# Patient Record
Sex: FEMALE | Race: BLACK OR AFRICAN AMERICAN | NOT HISPANIC OR LATINO | Employment: FULL TIME | URBAN - METROPOLITAN AREA
[De-identification: names, ages, dates, MRNs, and addresses within clinical notes are randomized per-mention and may not be internally consistent; named-entity substitution may affect disease eponyms.]

---

## 2023-01-20 PROBLEM — J45.30 MILD PERSISTENT ASTHMA WITHOUT COMPLICATION (HHS-HCC): Status: ACTIVE | Noted: 2023-01-20

## 2023-01-20 PROBLEM — M25.569 KNEE PAIN: Status: ACTIVE | Noted: 2023-01-20

## 2023-01-20 PROBLEM — Z98.890 HISTORY OF ABDOMINAL PARACENTESIS: Status: ACTIVE | Noted: 2023-01-20

## 2023-01-20 PROBLEM — M25.561 ACUTE PAIN OF RIGHT KNEE: Status: ACTIVE | Noted: 2023-01-20

## 2023-01-20 PROBLEM — R07.9 CHEST PAIN: Status: ACTIVE | Noted: 2023-01-20

## 2023-01-20 PROBLEM — J04.0 ACUTE LARYNGITIS: Status: ACTIVE | Noted: 2023-01-20

## 2023-01-20 PROBLEM — E78.2 COMBINED HYPERLIPIDEMIA: Status: ACTIVE | Noted: 2023-01-20

## 2023-01-20 PROBLEM — M54.9 BACK PAIN: Status: ACTIVE | Noted: 2023-01-20

## 2023-01-20 PROBLEM — M54.6 THORACIC BACK PAIN: Status: ACTIVE | Noted: 2023-01-20

## 2023-01-20 PROBLEM — S39.012A STRAIN OF LUMBAR PARASPINAL MUSCLE, INITIAL ENCOUNTER: Status: ACTIVE | Noted: 2023-01-20

## 2023-01-20 PROBLEM — R30.0 DYSURIA: Status: ACTIVE | Noted: 2023-01-20

## 2023-01-20 PROBLEM — M77.9 BONY SPUR: Status: ACTIVE | Noted: 2023-01-20

## 2023-01-20 PROBLEM — M67.88 ACHILLES TENDINOSIS: Status: ACTIVE | Noted: 2023-01-20

## 2023-01-20 PROBLEM — S29.012A UPPER BACK STRAIN: Status: ACTIVE | Noted: 2023-01-20

## 2023-01-20 PROBLEM — R30.9 PAIN WITH URINATION: Status: ACTIVE | Noted: 2023-01-20

## 2023-01-20 PROBLEM — M25.562 CHRONIC PAIN OF LEFT KNEE: Status: ACTIVE | Noted: 2023-01-20

## 2023-01-20 PROBLEM — N92.0 MENORRHAGIA WITH REGULAR CYCLE: Status: ACTIVE | Noted: 2023-01-20

## 2023-01-20 PROBLEM — M54.50 LOW BACK PAIN: Status: ACTIVE | Noted: 2023-01-20

## 2023-01-20 PROBLEM — M25.562 ACUTE PAIN OF LEFT KNEE: Status: ACTIVE | Noted: 2023-01-20

## 2023-01-20 PROBLEM — M79.673 FOOT PAIN: Status: ACTIVE | Noted: 2023-01-20

## 2023-01-20 PROBLEM — R05.9 COUGH: Status: ACTIVE | Noted: 2023-01-20

## 2023-01-20 PROBLEM — E78.5 DYSLIPIDEMIA: Status: ACTIVE | Noted: 2023-01-20

## 2023-01-20 PROBLEM — E11.9 DIABETES MELLITUS, TYPE 2 (MULTI): Status: ACTIVE | Noted: 2023-01-20

## 2023-01-20 PROBLEM — H00.015 HORDEOLUM EXTERNUM OF LEFT LOWER EYELID: Status: ACTIVE | Noted: 2023-01-20

## 2023-01-20 PROBLEM — M17.10 ARTHRITIS OF KNEE: Status: ACTIVE | Noted: 2023-01-20

## 2023-01-20 PROBLEM — L02.214 GROIN ABSCESS: Status: ACTIVE | Noted: 2023-01-20

## 2023-01-20 PROBLEM — N89.8 VAGINAL CYST: Status: ACTIVE | Noted: 2023-01-20

## 2023-01-20 PROBLEM — S29.012A STRAIN OF THORACIC SPINE: Status: ACTIVE | Noted: 2023-01-20

## 2023-01-20 PROBLEM — M62.838 MUSCLE SPASM: Status: ACTIVE | Noted: 2023-01-20

## 2023-01-20 PROBLEM — J06.9 VIRAL UPPER RESPIRATORY INFECTION: Status: ACTIVE | Noted: 2023-01-20

## 2023-01-20 PROBLEM — M17.11 PRIMARY OSTEOARTHRITIS OF RIGHT KNEE: Status: ACTIVE | Noted: 2023-01-20

## 2023-01-20 PROBLEM — M17.0 PRIMARY LOCALIZED OSTEOARTHRITIS OF BOTH KNEES: Status: ACTIVE | Noted: 2023-01-20

## 2023-01-20 PROBLEM — J06.9 VIRAL URI WITH COUGH: Status: ACTIVE | Noted: 2023-01-20

## 2023-01-20 PROBLEM — M92.60 HAGLUND'S DEFORMITY: Status: ACTIVE | Noted: 2023-01-20

## 2023-01-20 PROBLEM — J02.9 SORE THROAT: Status: ACTIVE | Noted: 2023-01-20

## 2023-01-20 PROBLEM — E11.9 DIABETES MELLITUS (MULTI): Status: ACTIVE | Noted: 2023-01-20

## 2023-01-20 PROBLEM — S76.111A: Status: ACTIVE | Noted: 2023-01-20

## 2023-01-20 PROBLEM — J20.9 ACUTE BRONCHITIS WITH BRONCHOSPASM: Status: ACTIVE | Noted: 2023-01-20

## 2023-01-20 PROBLEM — S89.92XA INJURY OF LEFT KNEE: Status: ACTIVE | Noted: 2023-01-20

## 2023-01-20 PROBLEM — M17.12 PRIMARY OSTEOARTHRITIS OF LEFT KNEE: Status: ACTIVE | Noted: 2023-01-20

## 2023-01-20 PROBLEM — N95.1 MENOPAUSAL SYMPTOMS: Status: ACTIVE | Noted: 2023-01-20
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
## 2023-01-20 PROBLEM — E66.01 MORBID OBESITY WITH BMI OF 40.0-44.9, ADULT (MULTI): Status: ACTIVE | Noted: 2023-01-20

## 2023-01-20 PROBLEM — K92.1 BLOOD IN STOOL: Status: ACTIVE | Noted: 2023-01-20

## 2023-01-20 PROBLEM — J45.21 MILD INTERMITTENT ASTHMA WITH ACUTE EXACERBATION (HHS-HCC): Status: ACTIVE | Noted: 2023-01-20

## 2023-01-20 PROBLEM — R06.2 WHEEZING: Status: ACTIVE | Noted: 2023-01-20

## 2023-01-20 PROBLEM — F41.9 ANXIETY: Status: ACTIVE | Noted: 2023-01-20

## 2023-01-20 PROBLEM — E66.9 OBESITY: Status: ACTIVE | Noted: 2023-01-20

## 2023-01-20 PROBLEM — N94.6 DYSMENORRHEA: Status: ACTIVE | Noted: 2023-01-20

## 2023-01-20 PROBLEM — G89.29 CHRONIC PAIN OF LEFT KNEE: Status: ACTIVE | Noted: 2023-01-20

## 2023-01-20 PROBLEM — E66.01 MORBID OBESITY WITH BODY MASS INDEX (BMI) OF 40.0 OR HIGHER (MULTI): Status: ACTIVE | Noted: 2023-01-20

## 2023-01-20 PROBLEM — J45.901 EXACERBATION OF ASTHMA (HHS-HCC): Status: ACTIVE | Noted: 2023-01-20

## 2023-01-20 PROBLEM — M54.2 NECK PAIN: Status: ACTIVE | Noted: 2023-01-20

## 2023-01-20 PROBLEM — S39.012A LUMBOSACRAL STRAIN: Status: ACTIVE | Noted: 2023-01-20

## 2023-01-20 PROBLEM — R35.0 URINARY FREQUENCY: Status: ACTIVE | Noted: 2023-01-20

## 2023-01-20 PROBLEM — G56.02 CARPAL TUNNEL SYNDROME OF LEFT WRIST: Status: ACTIVE | Noted: 2023-01-20

## 2023-01-20 PROBLEM — D64.9 ANEMIA: Status: ACTIVE | Noted: 2023-01-20

## 2023-01-20 PROBLEM — K21.9 GASTRO-ESOPHAGEAL REFLUX: Status: ACTIVE | Noted: 2023-01-20

## 2023-01-20 PROBLEM — M79.89 LEFT LEG SWELLING: Status: ACTIVE | Noted: 2023-01-20

## 2023-01-20 PROBLEM — R10.9 ABDOMINAL PAIN OF MULTIPLE SITES: Status: ACTIVE | Noted: 2023-01-20

## 2023-01-20 PROBLEM — R92.8 ABNORMAL MAMMOGRAM: Status: ACTIVE | Noted: 2023-01-20

## 2023-01-20 PROBLEM — I10 HYPERTENSION: Status: ACTIVE | Noted: 2023-01-20

## 2023-01-20 RX ORDER — SIMVASTATIN 20 MG/1
1 TABLET, FILM COATED ORAL NIGHTLY
COMMUNITY
Start: 2015-03-11 | End: 2023-12-01 | Stop reason: SDUPTHER

## 2023-01-20 RX ORDER — ALBUTEROL SULFATE 0.63 MG/3ML
SOLUTION RESPIRATORY (INHALATION)
COMMUNITY
Start: 2021-09-07

## 2023-01-20 RX ORDER — NAPROXEN 500 MG/1
1 TABLET ORAL EVERY 12 HOURS
COMMUNITY
Start: 2022-07-17

## 2023-01-20 RX ORDER — LANCETS
EACH MISCELLANEOUS
COMMUNITY

## 2023-01-20 RX ORDER — GLIMEPIRIDE 4 MG/1
1 TABLET ORAL
COMMUNITY
Start: 2015-03-11 | End: 2023-05-04

## 2023-01-20 RX ORDER — NAPROXEN 500 MG/1
1 TABLET ORAL EVERY 12 HOURS PRN
COMMUNITY
End: 2023-02-28 | Stop reason: SDUPTHER

## 2023-01-20 RX ORDER — LANCETS 33 GAUGE
EACH MISCELLANEOUS
COMMUNITY

## 2023-01-20 RX ORDER — METFORMIN HYDROCHLORIDE 500 MG/1
1 TABLET, EXTENDED RELEASE ORAL
COMMUNITY
Start: 2016-01-05 | End: 2023-04-06 | Stop reason: SDUPTHER

## 2023-01-20 RX ORDER — CYCLOBENZAPRINE HCL 5 MG
1-2 TABLET ORAL 3 TIMES DAILY PRN
COMMUNITY
Start: 2021-04-29 | End: 2023-10-23 | Stop reason: ALTCHOICE

## 2023-01-20 RX ORDER — LOSARTAN POTASSIUM 100 MG/1
1 TABLET ORAL DAILY
COMMUNITY
Start: 2015-03-11 | End: 2023-04-06 | Stop reason: SDUPTHER

## 2023-01-20 RX ORDER — TRIAMTERENE/HYDROCHLOROTHIAZID 37.5-25 MG
1 TABLET ORAL DAILY
COMMUNITY
Start: 2020-07-06 | End: 2023-10-23 | Stop reason: SDUPTHER

## 2023-01-20 RX ORDER — IPRATROPIUM BROMIDE 17 UG/1
2 AEROSOL, METERED RESPIRATORY (INHALATION)
COMMUNITY

## 2023-01-20 RX ORDER — BECLOMETHASONE DIPROPIONATE HFA 40 UG/1
AEROSOL, METERED RESPIRATORY (INHALATION)
COMMUNITY
Start: 2021-09-02

## 2023-01-20 RX ORDER — MELOXICAM 15 MG/1
1 TABLET ORAL DAILY
COMMUNITY
Start: 2021-04-29 | End: 2023-10-23 | Stop reason: ALTCHOICE

## 2023-01-20 RX ORDER — LIDOCAINE 50 MG/G
PATCH TOPICAL
COMMUNITY
Start: 2022-07-17 | End: 2023-10-23 | Stop reason: ALTCHOICE

## 2023-01-20 RX ORDER — CYCLOBENZAPRINE HCL 10 MG
1 TABLET ORAL NIGHTLY
COMMUNITY
Start: 2022-07-17 | End: 2023-10-23 | Stop reason: ALTCHOICE

## 2023-01-20 RX ORDER — FENOFIBRATE 160 MG/1
1 TABLET ORAL DAILY
COMMUNITY
Start: 2015-03-11

## 2023-03-06 ENCOUNTER — APPOINTMENT (OUTPATIENT)
Dept: PRIMARY CARE | Facility: CLINIC | Age: 58
End: 2023-03-06
Payer: COMMERCIAL

## 2023-04-06 ENCOUNTER — OFFICE VISIT (OUTPATIENT)
Dept: PRIMARY CARE | Facility: CLINIC | Age: 58
End: 2023-04-06
Payer: COMMERCIAL

## 2023-04-06 VITALS
WEIGHT: 208 LBS | DIASTOLIC BLOOD PRESSURE: 87 MMHG | BODY MASS INDEX: 40.62 KG/M2 | SYSTOLIC BLOOD PRESSURE: 158 MMHG | TEMPERATURE: 97.9 F

## 2023-04-06 DIAGNOSIS — E11.69 TYPE 2 DIABETES MELLITUS WITH OTHER SPECIFIED COMPLICATION, WITHOUT LONG-TERM CURRENT USE OF INSULIN (MULTI): ICD-10-CM

## 2023-04-06 DIAGNOSIS — E78.5 DYSLIPIDEMIA: ICD-10-CM

## 2023-04-06 DIAGNOSIS — E66.01 MORBID OBESITY WITH BMI OF 40.0-44.9, ADULT (MULTI): Primary | ICD-10-CM

## 2023-04-06 DIAGNOSIS — J45.30 MILD PERSISTENT ASTHMA WITHOUT COMPLICATION (HHS-HCC): ICD-10-CM

## 2023-04-06 DIAGNOSIS — M25.562 ACUTE PAIN OF LEFT KNEE: ICD-10-CM

## 2023-04-06 DIAGNOSIS — E11.9 TYPE 2 DIABETES MELLITUS WITHOUT COMPLICATION, WITHOUT LONG-TERM CURRENT USE OF INSULIN (MULTI): ICD-10-CM

## 2023-04-06 DIAGNOSIS — I10 PRIMARY HYPERTENSION: ICD-10-CM

## 2023-04-06 PROBLEM — R30.9 PAIN WITH URINATION: Status: RESOLVED | Noted: 2023-01-20 | Resolved: 2023-04-06

## 2023-04-06 PROBLEM — R30.0 DYSURIA: Status: RESOLVED | Noted: 2023-01-20 | Resolved: 2023-04-06

## 2023-04-06 PROBLEM — R06.2 WHEEZING: Status: RESOLVED | Noted: 2023-01-20 | Resolved: 2023-04-06

## 2023-04-06 PROBLEM — R05.9 COUGH: Status: RESOLVED | Noted: 2023-01-20 | Resolved: 2023-04-06

## 2023-04-06 PROBLEM — M79.673 FOOT PAIN: Status: RESOLVED | Noted: 2023-01-20 | Resolved: 2023-04-06

## 2023-04-06 LAB — POC HEMOGLOBIN A1C: 7.2 % (ref 4.2–6.5)

## 2023-04-06 PROCEDURE — 3077F SYST BP >= 140 MM HG: CPT | Performed by: INTERNAL MEDICINE

## 2023-04-06 PROCEDURE — 3079F DIAST BP 80-89 MM HG: CPT | Performed by: INTERNAL MEDICINE

## 2023-04-06 PROCEDURE — 4010F ACE/ARB THERAPY RXD/TAKEN: CPT | Performed by: INTERNAL MEDICINE

## 2023-04-06 PROCEDURE — 3008F BODY MASS INDEX DOCD: CPT | Performed by: INTERNAL MEDICINE

## 2023-04-06 PROCEDURE — 99214 OFFICE O/P EST MOD 30 MIN: CPT | Performed by: INTERNAL MEDICINE

## 2023-04-06 PROCEDURE — 83036 HEMOGLOBIN GLYCOSYLATED A1C: CPT | Performed by: INTERNAL MEDICINE

## 2023-04-06 RX ORDER — POTASSIUM CHLORIDE 750 MG/1
10 TABLET, FILM COATED, EXTENDED RELEASE ORAL DAILY
Qty: 30 TABLET | Refills: 11 | Status: SHIPPED | OUTPATIENT
Start: 2023-04-06 | End: 2024-04-05

## 2023-04-06 RX ORDER — METFORMIN HYDROCHLORIDE 500 MG/1
500 TABLET, EXTENDED RELEASE ORAL 2 TIMES DAILY
Qty: 180 TABLET | Refills: 1 | Status: SHIPPED | OUTPATIENT
Start: 2023-04-06 | End: 2023-09-30

## 2023-04-06 RX ORDER — LOSARTAN POTASSIUM 100 MG/1
100 TABLET ORAL DAILY
Qty: 90 TABLET | Refills: 1 | Status: SHIPPED | OUTPATIENT
Start: 2023-04-06

## 2023-04-06 RX ORDER — FLUTICASONE FUROATE AND VILANTEROL 100; 25 UG/1; UG/1
1 POWDER RESPIRATORY (INHALATION) ONCE
Status: DISCONTINUED | OUTPATIENT
Start: 2023-04-06 | End: 2023-04-06

## 2023-04-06 RX ORDER — FLUTICASONE FUROATE AND VILANTEROL 100; 25 UG/1; UG/1
1 POWDER RESPIRATORY (INHALATION) DAILY
Qty: 1 EACH | Refills: 4 | Status: SHIPPED | OUTPATIENT
Start: 2023-04-06

## 2023-04-06 ASSESSMENT — ENCOUNTER SYMPTOMS
FEVER: 0
FATIGUE: 0
ABDOMINAL PAIN: 0
NECK PAIN: 0
BLOOD IN STOOL: 0
CONSTIPATION: 0
COUGH: 0
MYALGIAS: 0
HEADACHES: 0
DIARRHEA: 0
ARTHRALGIAS: 0
PALPITATIONS: 0
DIZZINESS: 0
CHILLS: 0
SHORTNESS OF BREATH: 0
BACK PAIN: 0

## 2023-04-06 NOTE — PATIENT INSTRUCTIONS
Blood pressure is noted to be high  Cut back on salt intake  Exercise and eat healthy, lose weight before your knee surgery  Follow-up in 4 months or sooner if needed  Hba1c 7.2

## 2023-04-06 NOTE — PROGRESS NOTES
Subjective   Patient ID: Kim Yi is a 58 y.o. female.    HPI Ms. Manning is seen today for follow-up.  Her medical history significant for morbid obesity, hypertension, hyperlipidemia, diabetes, chronic knee pain.  She is currently following with orthopedics who is planning knee replacement.  She is trying to eat healthy and has lost about 8 pounds    Review of Systems   Constitutional:  Negative for chills, fatigue and fever.   Respiratory:  Negative for cough and shortness of breath.    Cardiovascular:  Negative for chest pain, palpitations and leg swelling.   Gastrointestinal:  Negative for abdominal pain, blood in stool, constipation and diarrhea.   Endocrine: Negative for cold intolerance and heat intolerance.   Musculoskeletal:  Negative for arthralgias, back pain, myalgias and neck pain.   Neurological:  Negative for dizziness and headaches.       Objective   Physical Exam  Vitals reviewed.   Constitutional:       Appearance: Normal appearance.   HENT:      Head: Normocephalic and atraumatic.   Cardiovascular:      Rate and Rhythm: Normal rate and regular rhythm.   Pulmonary:      Effort: Pulmonary effort is normal. No respiratory distress.      Breath sounds: Normal breath sounds.   Abdominal:      General: Bowel sounds are normal. There is no distension.      Tenderness: There is no abdominal tenderness.   Musculoskeletal:         General: No swelling or tenderness. Normal range of motion.      Cervical back: Normal range of motion.   Skin:     General: Skin is warm.   Neurological:      General: No focal deficit present.      Mental Status: She is alert.   Psychiatric:         Mood and Affect: Mood normal.         Behavior: Behavior normal.         Assessment/Plan   Diagnoses and all orders for this visit:  Morbid obesity with BMI of 40.0-44.9, adult (CMS/[facility])  Comments:  Continue with healthy diet and lose weight prior to knee surgery  Type 2 diabetes mellitus with other specified complication,  without long-term current use of insulin (CMS/HCC)  -     metFORMIN XR (Glucophage-XR) 500 mg 24 hr tablet; Take 1 tablet (500 mg) by mouth in the morning and 1 tablet (500 mg) before bedtime. Twice a day.  Primary hypertension  -     potassium chloride CR (Klor-Con) 10 mEq ER tablet; Take 1 tablet (10 mEq) by mouth once daily. Do not crush, chew, or split.  -     losartan (Cozaar) 100 mg tablet; Take 1 tablet (100 mg) by mouth once daily.  Dyslipidemia  Type 2 diabetes mellitus with other specified complication, without long-term current use of insulin (CMS/HCC)  Comments:  Hemoglobin A1c in office today 7.2  Orders:  -     metFORMIN XR (Glucophage-XR) 500 mg 24 hr tablet; Take 1 tablet (500 mg) by mouth in the morning and 1 tablet (500 mg) before bedtime. Twice a day.  Type 2 diabetes mellitus with other specified complication, without long-term current use of insulin (CMS/HCC)  -     metFORMIN XR (Glucophage-XR) 500 mg 24 hr tablet; Take 1 tablet (500 mg) by mouth in the morning and 1 tablet (500 mg) before bedtime. Twice a day.  Type 2 diabetes mellitus without complication, without long-term current use of insulin (CMS/HCC)  -     POCT glycosylated hemoglobin (Hb A1C) manually resulted  Mild persistent asthma without complication  Comments:  Breo inhaler once a day  Orders:  -     fluticasone furoate-vilanteroL (Breo Elipta) 100-25 mcg/dose inhaler 1 puff  Acute pain of left knee  Comments:  Follow-up with orthopedics  Possible knee replacement

## 2023-04-22 ENCOUNTER — LAB (OUTPATIENT)
Dept: LAB | Facility: LAB | Age: 58
End: 2023-04-22
Payer: COMMERCIAL

## 2023-04-22 DIAGNOSIS — E11.69 TYPE 2 DIABETES MELLITUS WITH OTHER SPECIFIED COMPLICATION, WITHOUT LONG-TERM CURRENT USE OF INSULIN (MULTI): ICD-10-CM

## 2023-04-22 LAB
ALANINE AMINOTRANSFERASE (SGPT) (U/L) IN SER/PLAS: 26 U/L (ref 7–45)
ALBUMIN (G/DL) IN SER/PLAS: 4.2 G/DL (ref 3.4–5)
ALKALINE PHOSPHATASE (U/L) IN SER/PLAS: 58 U/L (ref 33–110)
ANION GAP IN SER/PLAS: 13 MMOL/L (ref 10–20)
ASPARTATE AMINOTRANSFERASE (SGOT) (U/L) IN SER/PLAS: 30 U/L (ref 9–39)
BILIRUBIN TOTAL (MG/DL) IN SER/PLAS: 0.5 MG/DL (ref 0–1.2)
CALCIUM (MG/DL) IN SER/PLAS: 10.1 MG/DL (ref 8.6–10.6)
CARBON DIOXIDE, TOTAL (MMOL/L) IN SER/PLAS: 28 MMOL/L (ref 21–32)
CHLORIDE (MMOL/L) IN SER/PLAS: 107 MMOL/L (ref 98–107)
CREATININE (MG/DL) IN SER/PLAS: 0.78 MG/DL (ref 0.5–1.05)
GFR FEMALE: 89 ML/MIN/1.73M2
GLUCOSE (MG/DL) IN SER/PLAS: 78 MG/DL (ref 74–99)
POTASSIUM (MMOL/L) IN SER/PLAS: 4.5 MMOL/L (ref 3.5–5.3)
PROTEIN TOTAL: 6.7 G/DL (ref 6.4–8.2)
SODIUM (MMOL/L) IN SER/PLAS: 143 MMOL/L (ref 136–145)
UREA NITROGEN (MG/DL) IN SER/PLAS: 13 MG/DL (ref 6–23)

## 2023-04-22 PROCEDURE — 36415 COLL VENOUS BLD VENIPUNCTURE: CPT

## 2023-04-22 PROCEDURE — 80053 COMPREHEN METABOLIC PANEL: CPT

## 2023-05-04 DIAGNOSIS — E11.9 TYPE 2 DIABETES MELLITUS WITHOUT COMPLICATIONS (MULTI): ICD-10-CM

## 2023-05-04 RX ORDER — GLIMEPIRIDE 4 MG/1
TABLET ORAL
Qty: 90 TABLET | Refills: 2 | Status: SHIPPED | OUTPATIENT
Start: 2023-05-04

## 2023-09-29 DIAGNOSIS — E11.69 TYPE 2 DIABETES MELLITUS WITH OTHER SPECIFIED COMPLICATION, WITHOUT LONG-TERM CURRENT USE OF INSULIN (MULTI): ICD-10-CM

## 2023-09-30 RX ORDER — METFORMIN HYDROCHLORIDE 500 MG/1
500 TABLET, EXTENDED RELEASE ORAL 2 TIMES DAILY
Qty: 180 TABLET | Refills: 1 | Status: SHIPPED | OUTPATIENT
Start: 2023-09-30 | End: 2024-04-01

## 2023-10-23 ENCOUNTER — OFFICE VISIT (OUTPATIENT)
Dept: PRIMARY CARE | Facility: CLINIC | Age: 58
End: 2023-10-23
Payer: COMMERCIAL

## 2023-10-23 VITALS
SYSTOLIC BLOOD PRESSURE: 161 MMHG | BODY MASS INDEX: 43.94 KG/M2 | TEMPERATURE: 97.6 F | WEIGHT: 225 LBS | DIASTOLIC BLOOD PRESSURE: 88 MMHG

## 2023-10-23 DIAGNOSIS — R05.2 SUBACUTE COUGH: ICD-10-CM

## 2023-10-23 DIAGNOSIS — J45.30 MILD PERSISTENT ASTHMA WITHOUT COMPLICATION (HHS-HCC): Primary | ICD-10-CM

## 2023-10-23 DIAGNOSIS — E11.69 TYPE 2 DIABETES MELLITUS WITH OTHER SPECIFIED COMPLICATION, WITHOUT LONG-TERM CURRENT USE OF INSULIN (MULTI): ICD-10-CM

## 2023-10-23 DIAGNOSIS — I10 PRIMARY HYPERTENSION: ICD-10-CM

## 2023-10-23 DIAGNOSIS — E78.5 DYSLIPIDEMIA: ICD-10-CM

## 2023-10-23 PROCEDURE — 3079F DIAST BP 80-89 MM HG: CPT | Performed by: INTERNAL MEDICINE

## 2023-10-23 PROCEDURE — 3077F SYST BP >= 140 MM HG: CPT | Performed by: INTERNAL MEDICINE

## 2023-10-23 PROCEDURE — 3008F BODY MASS INDEX DOCD: CPT | Performed by: INTERNAL MEDICINE

## 2023-10-23 PROCEDURE — 99214 OFFICE O/P EST MOD 30 MIN: CPT | Performed by: INTERNAL MEDICINE

## 2023-10-23 PROCEDURE — 4010F ACE/ARB THERAPY RXD/TAKEN: CPT | Performed by: INTERNAL MEDICINE

## 2023-10-23 RX ORDER — AZITHROMYCIN 250 MG/1
TABLET, FILM COATED ORAL
Qty: 6 TABLET | Refills: 0 | Status: SHIPPED | OUTPATIENT
Start: 2023-10-23 | End: 2023-10-28

## 2023-10-23 RX ORDER — TRIAMTERENE/HYDROCHLOROTHIAZID 37.5-25 MG
1 TABLET ORAL DAILY
Qty: 90 TABLET | Refills: 1 | Status: SHIPPED | OUTPATIENT
Start: 2023-10-23

## 2023-10-23 ASSESSMENT — ENCOUNTER SYMPTOMS
WEAKNESS: 0
FREQUENCY: 0
BLOOD IN STOOL: 0
NECK PAIN: 0
APPETITE CHANGE: 0
DECREASED CONCENTRATION: 0
DYSURIA: 0
BACK PAIN: 0
FLANK PAIN: 0
NERVOUS/ANXIOUS: 0
PALPITATIONS: 0
COUGH: 1
UNEXPECTED WEIGHT CHANGE: 0
WHEEZING: 0
SORE THROAT: 0
ACTIVITY CHANGE: 0
ABDOMINAL PAIN: 0
CHEST TIGHTNESS: 0
ARTHRALGIAS: 0
HEADACHES: 0
DIZZINESS: 0
LIGHT-HEADEDNESS: 0
SHORTNESS OF BREATH: 0
DIFFICULTY URINATING: 0
SLEEP DISTURBANCE: 0
CHILLS: 0

## 2023-10-23 NOTE — PATIENT INSTRUCTIONS
Eat Healthy , cut back on sugars and carbs  Start Ozempic 0.25 mg inj once weekly  Restart maxzide  Follow in 3 wksmonths

## 2023-10-23 NOTE — PROGRESS NOTES
Subjective   Patient ID: Kim Yi is a 58 y.o. female.    HPI patient is seen today for follow-up.  She had COVID infection 2 months ago.  She is still experiencing cough with shortness of breath.  She has asthma and uses Breo inhaler.  She denies any postnasal drip.  She denies shortness of breath.  She complains of a dry cough on a daily basis.  She has diabetes and takes metformin and glimepiride.  She still has hypertension and states that she ran out of her Maxide.  She feels well otherwise.    Review of Systems   Constitutional:  Negative for activity change, appetite change, chills and unexpected weight change.        Weight gain   HENT:  Negative for congestion, postnasal drip and sore throat.    Eyes:  Negative for visual disturbance.   Respiratory:  Positive for cough. Negative for chest tightness, shortness of breath and wheezing.    Cardiovascular:  Negative for chest pain, palpitations and leg swelling.   Gastrointestinal:  Negative for abdominal pain and blood in stool.   Endocrine: Negative for cold intolerance and heat intolerance.   Genitourinary:  Negative for difficulty urinating, dysuria, flank pain and frequency.   Musculoskeletal:  Negative for arthralgias, back pain, gait problem and neck pain.   Skin:  Negative for rash.   Allergic/Immunologic: Negative for environmental allergies and food allergies.   Neurological:  Negative for dizziness, weakness, light-headedness and headaches.   Psychiatric/Behavioral:  Negative for decreased concentration and sleep disturbance. The patient is not nervous/anxious.        Objective   Physical Exam  Vitals reviewed.   Constitutional:       General: She is not in acute distress.     Appearance: Normal appearance.   HENT:      Head: Normocephalic and atraumatic.      Mouth/Throat:      Mouth: Mucous membranes are moist.   Eyes:      Extraocular Movements: Extraocular movements intact.   Cardiovascular:      Rate and Rhythm: Normal rate and regular  rhythm.      Pulses: Normal pulses.   Pulmonary:      Effort: Pulmonary effort is normal. No respiratory distress.      Breath sounds: Normal breath sounds.   Abdominal:      General: Bowel sounds are normal. There is no distension.      Tenderness: There is no abdominal tenderness.   Musculoskeletal:         General: No swelling or tenderness. Normal range of motion.      Cervical back: Normal range of motion.   Skin:     General: Skin is warm.   Neurological:      General: No focal deficit present.      Mental Status: She is alert.      Coordination: Coordination normal.      Gait: Gait normal.   Psychiatric:         Mood and Affect: Mood normal.         Behavior: Behavior normal.         Assessment/Plan   Diagnoses and all orders for this visit:  Mild persistent asthma without complication  Comments:  Continue with Breo and albuterol  Subacute cough  Comments:  Mild persistent cough with asthma  Start azithromycin  Continue with Breo  Orders:  -     azithromycin (Zithromax) 250 mg tablet; Take 2 tablets (500 mg) by mouth once daily for 1 day, THEN 1 tablet (250 mg) once daily for 4 days. Take 2 tabs (500 mg) by mouth today, than 1 daily for 4 days..  BMI 40.0-44.9, adult (CMS/[facility])  Comments:  Patient to eat healthy and lose weight  Type 2 diabetes mellitus with other specified complication, without long-term current use of insulin (CMS/[facility])  Comments:  Start Ozempic 0.25 mg injection weekly  Continue metformin, glimepiride  Eat a healthy diet and exercise  Orders:  -     semaglutide 0.25 mg or 0.5 mg (2 mg/3 mL) pen injector; Inject 0.25 mg under the skin 1 (one) time per week.  -     Hemoglobin A1c; Future  Dyslipidemia  -     Lipid Panel; Future  Primary hypertension  Comments:  Blood pressure is uncontrolled  Restart maxzide  Continue losartan and monitor  Orders:  -     triamterene-hydrochlorothiazid (Maxzide-25) 37.5-25 mg tablet; Take 1 tablet by mouth once daily.  Check hemoglobin A1c, side effects of  Ozempic explained and discussed in detail  Check lipid panel  Follow-up in 3 months

## 2023-10-25 ENCOUNTER — LAB (OUTPATIENT)
Dept: LAB | Facility: LAB | Age: 58
End: 2023-10-25
Payer: COMMERCIAL

## 2023-10-25 DIAGNOSIS — E11.69 TYPE 2 DIABETES MELLITUS WITH OTHER SPECIFIED COMPLICATION, WITHOUT LONG-TERM CURRENT USE OF INSULIN (MULTI): ICD-10-CM

## 2023-10-25 DIAGNOSIS — E78.5 DYSLIPIDEMIA: ICD-10-CM

## 2023-10-25 LAB
CHOLEST SERPL-MCNC: 213 MG/DL (ref 0–199)
CHOLESTEROL/HDL RATIO: 5.5
EST. AVERAGE GLUCOSE BLD GHB EST-MCNC: 171 MG/DL
HBA1C MFR BLD: 7.6 %
HDLC SERPL-MCNC: 38.8 MG/DL
LDLC SERPL CALC-MCNC: 140 MG/DL
NON HDL CHOLESTEROL: 174 MG/DL (ref 0–149)
TRIGL SERPL-MCNC: 173 MG/DL (ref 0–149)
VLDL: 35 MG/DL (ref 0–40)

## 2023-10-25 PROCEDURE — 83036 HEMOGLOBIN GLYCOSYLATED A1C: CPT

## 2023-10-25 PROCEDURE — 80061 LIPID PANEL: CPT

## 2023-10-25 PROCEDURE — 36415 COLL VENOUS BLD VENIPUNCTURE: CPT

## 2023-11-19 ENCOUNTER — ANCILLARY PROCEDURE (OUTPATIENT)
Dept: RADIOLOGY | Facility: CLINIC | Age: 58
End: 2023-11-19
Payer: COMMERCIAL

## 2023-11-19 DIAGNOSIS — M19.90 ARTHRITIS: ICD-10-CM

## 2023-11-19 PROCEDURE — 73564 X-RAY EXAM KNEE 4 OR MORE: CPT | Mod: LEFT SIDE | Performed by: RADIOLOGY

## 2023-11-19 PROCEDURE — 73564 X-RAY EXAM KNEE 4 OR MORE: CPT | Mod: LT,FY

## 2023-11-20 ENCOUNTER — APPOINTMENT (OUTPATIENT)
Dept: DERMATOLOGY | Facility: HOSPITAL | Age: 58
End: 2023-11-20
Payer: COMMERCIAL

## 2023-11-29 ENCOUNTER — OFFICE VISIT (OUTPATIENT)
Dept: ORTHOPEDIC SURGERY | Facility: CLINIC | Age: 58
End: 2023-11-29
Payer: COMMERCIAL

## 2023-11-29 DIAGNOSIS — M17.0 PRIMARY LOCALIZED OSTEOARTHRITIS OF BOTH KNEES: Primary | ICD-10-CM

## 2023-11-29 PROCEDURE — 1036F TOBACCO NON-USER: CPT | Performed by: STUDENT IN AN ORGANIZED HEALTH CARE EDUCATION/TRAINING PROGRAM

## 2023-11-29 PROCEDURE — 99214 OFFICE O/P EST MOD 30 MIN: CPT | Performed by: STUDENT IN AN ORGANIZED HEALTH CARE EDUCATION/TRAINING PROGRAM

## 2023-11-29 PROCEDURE — 20610 DRAIN/INJ JOINT/BURSA W/O US: CPT | Performed by: STUDENT IN AN ORGANIZED HEALTH CARE EDUCATION/TRAINING PROGRAM

## 2023-11-29 PROCEDURE — 3050F LDL-C >= 130 MG/DL: CPT | Performed by: STUDENT IN AN ORGANIZED HEALTH CARE EDUCATION/TRAINING PROGRAM

## 2023-11-29 PROCEDURE — 4010F ACE/ARB THERAPY RXD/TAKEN: CPT | Performed by: STUDENT IN AN ORGANIZED HEALTH CARE EDUCATION/TRAINING PROGRAM

## 2023-11-29 PROCEDURE — 3051F HG A1C>EQUAL 7.0%<8.0%: CPT | Performed by: STUDENT IN AN ORGANIZED HEALTH CARE EDUCATION/TRAINING PROGRAM

## 2023-11-29 PROCEDURE — 3008F BODY MASS INDEX DOCD: CPT | Performed by: STUDENT IN AN ORGANIZED HEALTH CARE EDUCATION/TRAINING PROGRAM

## 2023-11-29 RX ORDER — TRIAMCINOLONE ACETONIDE 40 MG/ML
1 INJECTION, SUSPENSION INTRA-ARTICULAR; INTRAMUSCULAR
Status: COMPLETED | OUTPATIENT
Start: 2023-11-29 | End: 2023-11-29

## 2023-11-29 RX ORDER — LIDOCAINE HYDROCHLORIDE 10 MG/ML
4 INJECTION INFILTRATION; PERINEURAL
Status: COMPLETED | OUTPATIENT
Start: 2023-11-29 | End: 2023-11-29

## 2023-11-29 RX ADMIN — TRIAMCINOLONE ACETONIDE 1 ML: 40 INJECTION, SUSPENSION INTRA-ARTICULAR; INTRAMUSCULAR at 15:46

## 2023-11-29 RX ADMIN — LIDOCAINE HYDROCHLORIDE 4 ML: 10 INJECTION INFILTRATION; PERINEURAL at 15:46

## 2023-11-29 ASSESSMENT — PAIN - FUNCTIONAL ASSESSMENT: PAIN_FUNCTIONAL_ASSESSMENT: 0-10

## 2023-11-29 ASSESSMENT — PAIN SCALES - GENERAL: PAINLEVEL_OUTOF10: 4

## 2023-11-29 NOTE — LETTER
November 29, 2023     Patient: Kim Yi   YOB: 1965   Date of Visit: 11/29/2023       To Whom it May Concern:    Kim Yi was seen in my clinic on 11/29/2023.  It is my opinion that it is unsafe for  to go up and down stairs. She will need an working elevator at all times do to her knee arthritis.     If you have any questions or concerns, please don't hesitate to call.         Sincerely,          Gus Glover MD        CC:   No Recipients

## 2023-11-29 NOTE — PROGRESS NOTES
Chief complaint: Bilateral knee pain    HPI: KINSEY MAYFIELD is a pleasant 58 y.o. female who is seen today for follow-up evaluation of bilateral knee pain. I last saw the patient in February.  At that time, her body mass index and hemoglobin A1c precluded her from consideration of total knee arthroplasty.  We did proceed with giving her intra-articular injections.  The injections unfortunately only lasted 2 weeks.  Since that time, she has had worsening of her bilateral knee pain.  She does complain of frequent instability and has fallen.  The patient works as a teacher at a school.  The elevator has been out for the past several weeks.  She has been forced to ascend and descend stairs which is very difficult for her.  She does feel unstable and has had difficulty doing so.  She has tried other conservative treatments including over-the-counter nonsteroidal anti-inflammatories and meloxicam.  She has not yet tried hyaluronic acid injections.  She was recently started on Ozempic for her diabetes and weight.    Last hemoglobin A1c from October 2023 was 7.6%     Objective:  BMI: 43.94  General: Well-appearing female in no acute distress. Awake, alert and oriented. Pleasant and cooperative.  Respiratory: Non-labored breathing  Mood: Euthymic   Gait: Antalgic  Assistive Device: None      Affected Right Knee  Limb Alignment: Mild varus  ROM: 0-95  Stable to varus and valgus stress at full extension and 30 degrees of flexion. Fixed deformity. Not correctable  Skin: Intact, no abrasions or draining sinuses  Effusion: None  Quad Strength: 5/5  Hamstring Strength: 5/5  Patella Crepitus: None  Patella Grind: Positive  Tenderness: Medial and lateral joint line  Sensation: Intact to light touch distally  Motor function: Able to fire TA, EHL, G/S  Pulses: Not palpable     Affected Left Knee  Limb Alignment: Mild varus  ROM: 5-95  Stable to varus and valgus stress at full extension and 30 degrees of flexion  Skin: Intact, no  abrasions or draining sinuses  Effusion: None  Quad Strength: 5/5  Hamstring Strength: 5/5  Patella Crepitus: N positive present one  Patella Grind: Negative  Tenderness: Medial and lateral joint line  Sensation: Intact to light touch distally  Motor function: Able to fire TA, EHL, G/S  Pulses: Not palpable     Imaging:   AP/ lateral/ mid-flexion/sunrise views: Independent review of left knee x-rays was performed. The findings were reviewed with the patient. There are severe degenerative changes of the left knee with varus limb alignment. There is joint space narrowing, subchondral sclerosis, and osteophyte formation. No evidence of fracture, AVN, dislocation, osteomyelitis.     Assessment and plan  Kim Yi is a pleasant 58 y.o. year-old female who I am seeing today for end-stage osteoarthritis.  I believe that the patient has maximized conservative treatment which has failed to provide them with meaningful relief.  They report that their quality of life and ability to perform activities of daily living and hobbies is negatively impacted due to their joint pain.  Accordingly, I do think it is reasonable to proceed with total joint replacement.  However, the patient's body mass index is above what I would consider to be a safe threshold. I had a lengthy discussion with the patient regarding the risks of proceeding with surgery when her BMI exceeds 40.  There is ample literature that has repeatedly demonstrated that the risk of infection, deep vein thrombosis, pulmonary embolism, component malposition, fracture and overall complication are higher when BMI exceeds 40.  I have encouraged the patient to lose weight through diet and exercise.  We discussed that exercising under the buoyancy of water potentially in a pool can help take stress off of the joint and allow people to lose weight despite their disability caused by their joint pain.  We discussed referral to our comprehensive weight loss center which  includes a nutritionist and a bariatric surgeon.  The patient verbalized understanding and had an opportunity to ask questions.    We discussed continuing with nonsurgical treatments until the patient is a candidate for total joint arthroplasty.  Risks of repeat injection were discussed with the patient.    Patient ID: Kim Yi is a 58 y.o. female.    L Inj/Asp: bilateral knee on 11/29/2023 3:46 PM  Indications: pain and joint swelling  Details: 22 G needle, anterolateral approach  Medications (Right): 1 mL triamcinolone acetonide 40 mg/mL; 4 mL lidocaine 10 mg/mL (1 %)  Medications (Left): 1 mL triamcinolone acetonide 40 mg/mL; 4 mL lidocaine 10 mg/mL (1 %)    Discussion:  I discussed the conservative treatment options for knee osteoarthritis including but not limited to physical therapy, oral NSAIDS, activity and lifestyle modification, and corticosteroid injections. Pt has elected to undergo a cortisone injection today. I have explained the risk and benefits of an injection including the possibility of joint infection, bleeding, damage to cartilage, allergic reaction. Patient verbalized understanding and gave verbal consent wishes to proceed with a intra-articular cortisone injection for their knee.    Procedure:  After discussing the risk and benefits of the procedure, we proceeded with an intra-articular bilateral knee injection.    With the patient's informed verbal consent, the bilateral knees were prepped in standard sterile fashion with Chlorhexidine. The skin was then anesthetized with ethyl chloride spray and cleaned again with Chlorhexidine. The bilateral knees were then apirated/injected with a prefilled 20-gauge syringe of 40 mg Kenalog + 4 ml Lidocaine using the lateral approach without complications.  The patient tolerated this well and felt immediate initial relief of symptoms. A bandaid was applied and the patient ambulated out of the clinic on ther own accord without difficulty. Patient  was instructed to avoid physical activity for 24-48 hours to prevent the knees from swelling and may ice the knees as tolerated. Patient should contact the office if any signs of of infection appear: redness, fever, chills, drainage, swelling or warmth to the knees.  Pt understands that the injections can be repeated no sooner than 3 months.      Procedure, treatment alternatives, risks and benefits explained, specific risks discussed. Consent was given by the patient. Immediately prior to procedure a time out was called to verify the correct patient, procedure, equipment, support staff and site/side marked as required. Patient was prepped and draped in the usual sterile fashion.       Return to clinic in 4 months.  Repeat right knee radiographs at that time.    * This office note was dictated using Dragon voice to text software and was not proofread for spelling or grammatical errors *

## 2023-12-01 DIAGNOSIS — E11.69 TYPE 2 DIABETES MELLITUS WITH OTHER SPECIFIED COMPLICATION, WITHOUT LONG-TERM CURRENT USE OF INSULIN (MULTI): ICD-10-CM

## 2023-12-01 DIAGNOSIS — E78.5 DYSLIPIDEMIA: Primary | ICD-10-CM

## 2023-12-04 RX ORDER — SIMVASTATIN 20 MG/1
20 TABLET, FILM COATED ORAL NIGHTLY
Qty: 90 TABLET | Refills: 2 | Status: SHIPPED | OUTPATIENT
Start: 2023-12-04

## 2023-12-04 RX ORDER — PEN NEEDLE, DIABETIC 29 G X1/2"
NEEDLE, DISPOSABLE MISCELLANEOUS
Qty: 100 EACH | Refills: 5 | Status: SHIPPED | OUTPATIENT
Start: 2023-12-04 | End: 2024-11-30

## 2023-12-05 ENCOUNTER — HOSPITAL ENCOUNTER (EMERGENCY)
Facility: HOSPITAL | Age: 58
Discharge: HOME | End: 2023-12-06
Payer: COMMERCIAL

## 2023-12-05 ENCOUNTER — APPOINTMENT (OUTPATIENT)
Dept: RADIOLOGY | Facility: HOSPITAL | Age: 58
End: 2023-12-05
Payer: COMMERCIAL

## 2023-12-05 DIAGNOSIS — R10.11 RIGHT UPPER QUADRANT ABDOMINAL PAIN: Primary | ICD-10-CM

## 2023-12-05 DIAGNOSIS — M54.50 ACUTE RIGHT-SIDED LOW BACK PAIN WITHOUT SCIATICA: ICD-10-CM

## 2023-12-05 LAB
ALBUMIN SERPL BCP-MCNC: 4.1 G/DL (ref 3.4–5)
ALP SERPL-CCNC: 53 U/L (ref 33–110)
ALT SERPL W P-5'-P-CCNC: 33 U/L (ref 7–45)
ANION GAP SERPL CALC-SCNC: 12 MMOL/L (ref 10–20)
APPEARANCE UR: CLEAR
AST SERPL W P-5'-P-CCNC: 26 U/L (ref 9–39)
BASOPHILS # BLD AUTO: 0.03 X10*3/UL (ref 0–0.1)
BASOPHILS NFR BLD AUTO: 0.2 %
BILIRUB SERPL-MCNC: 0.5 MG/DL (ref 0–1.2)
BILIRUB UR STRIP.AUTO-MCNC: NEGATIVE MG/DL
BUN SERPL-MCNC: 15 MG/DL (ref 6–23)
CALCIUM SERPL-MCNC: 9.4 MG/DL (ref 8.6–10.3)
CHLORIDE SERPL-SCNC: 101 MMOL/L (ref 98–107)
CO2 SERPL-SCNC: 27 MMOL/L (ref 21–32)
COLOR UR: YELLOW
CREAT SERPL-MCNC: 0.8 MG/DL (ref 0.5–1.05)
EOSINOPHIL # BLD AUTO: 0.24 X10*3/UL (ref 0–0.7)
EOSINOPHIL NFR BLD AUTO: 2 %
ERYTHROCYTE [DISTWIDTH] IN BLOOD BY AUTOMATED COUNT: 14.4 % (ref 11.5–14.5)
GFR SERPL CREATININE-BSD FRML MDRD: 87 ML/MIN/1.73M*2
GLUCOSE SERPL-MCNC: 95 MG/DL (ref 74–99)
GLUCOSE UR STRIP.AUTO-MCNC: NEGATIVE MG/DL
HCG UR QL IA.RAPID: NEGATIVE
HCT VFR BLD AUTO: 39.5 % (ref 36–46)
HGB BLD-MCNC: 13 G/DL (ref 12–16)
IMM GRANULOCYTES # BLD AUTO: 0.06 X10*3/UL (ref 0–0.7)
IMM GRANULOCYTES NFR BLD AUTO: 0.5 % (ref 0–0.9)
KETONES UR STRIP.AUTO-MCNC: NEGATIVE MG/DL
LEUKOCYTE ESTERASE UR QL STRIP.AUTO: NEGATIVE
LIPASE SERPL-CCNC: 81 U/L (ref 9–82)
LYMPHOCYTES # BLD AUTO: 5.41 X10*3/UL (ref 1.2–4.8)
LYMPHOCYTES NFR BLD AUTO: 44 %
MCH RBC QN AUTO: 27.4 PG (ref 26–34)
MCHC RBC AUTO-ENTMCNC: 32.9 G/DL (ref 32–36)
MCV RBC AUTO: 83 FL (ref 80–100)
MONOCYTES # BLD AUTO: 0.7 X10*3/UL (ref 0.1–1)
MONOCYTES NFR BLD AUTO: 5.7 %
NEUTROPHILS # BLD AUTO: 5.86 X10*3/UL (ref 1.2–7.7)
NEUTROPHILS NFR BLD AUTO: 47.6 %
NITRITE UR QL STRIP.AUTO: NEGATIVE
NRBC BLD-RTO: 0 /100 WBCS (ref 0–0)
PH UR STRIP.AUTO: 7 [PH]
PLATELET # BLD AUTO: 290 X10*3/UL (ref 150–450)
POTASSIUM SERPL-SCNC: 4 MMOL/L (ref 3.5–5.3)
PROT SERPL-MCNC: 7.1 G/DL (ref 6.4–8.2)
PROT UR STRIP.AUTO-MCNC: NEGATIVE MG/DL
RBC # BLD AUTO: 4.74 X10*6/UL (ref 4–5.2)
RBC # UR STRIP.AUTO: NEGATIVE /UL
SODIUM SERPL-SCNC: 136 MMOL/L (ref 136–145)
SP GR UR STRIP.AUTO: 1.01
UROBILINOGEN UR STRIP.AUTO-MCNC: <2 MG/DL
WBC # BLD AUTO: 12.3 X10*3/UL (ref 4.4–11.3)

## 2023-12-05 PROCEDURE — 36415 COLL VENOUS BLD VENIPUNCTURE: CPT | Performed by: PHYSICIAN ASSISTANT

## 2023-12-05 PROCEDURE — 96374 THER/PROPH/DIAG INJ IV PUSH: CPT | Mod: 59

## 2023-12-05 PROCEDURE — 80053 COMPREHEN METABOLIC PANEL: CPT | Performed by: PHYSICIAN ASSISTANT

## 2023-12-05 PROCEDURE — 74177 CT ABD & PELVIS W/CONTRAST: CPT | Performed by: RADIOLOGY

## 2023-12-05 PROCEDURE — 2550000001 HC RX 255 CONTRASTS: Performed by: PHYSICIAN ASSISTANT

## 2023-12-05 PROCEDURE — 2500000004 HC RX 250 GENERAL PHARMACY W/ HCPCS (ALT 636 FOR OP/ED): Performed by: PHYSICIAN ASSISTANT

## 2023-12-05 PROCEDURE — 85025 COMPLETE CBC W/AUTO DIFF WBC: CPT | Performed by: PHYSICIAN ASSISTANT

## 2023-12-05 PROCEDURE — 74177 CT ABD & PELVIS W/CONTRAST: CPT

## 2023-12-05 PROCEDURE — 81003 URINALYSIS AUTO W/O SCOPE: CPT | Performed by: PHYSICIAN ASSISTANT

## 2023-12-05 PROCEDURE — 83690 ASSAY OF LIPASE: CPT | Performed by: PHYSICIAN ASSISTANT

## 2023-12-05 PROCEDURE — 99284 EMERGENCY DEPT VISIT MOD MDM: CPT

## 2023-12-05 PROCEDURE — 81025 URINE PREGNANCY TEST: CPT | Performed by: PHYSICIAN ASSISTANT

## 2023-12-05 PROCEDURE — 96375 TX/PRO/DX INJ NEW DRUG ADDON: CPT

## 2023-12-05 RX ORDER — CYCLOBENZAPRINE HCL 10 MG
10 TABLET ORAL 3 TIMES DAILY PRN
Qty: 15 TABLET | Refills: 0 | Status: SHIPPED | OUTPATIENT
Start: 2023-12-05

## 2023-12-05 RX ORDER — ONDANSETRON HYDROCHLORIDE 2 MG/ML
4 INJECTION, SOLUTION INTRAVENOUS ONCE
Status: COMPLETED | OUTPATIENT
Start: 2023-12-05 | End: 2023-12-05

## 2023-12-05 RX ORDER — KETOROLAC TROMETHAMINE 30 MG/ML
30 INJECTION, SOLUTION INTRAMUSCULAR; INTRAVENOUS ONCE
Status: COMPLETED | OUTPATIENT
Start: 2023-12-05 | End: 2023-12-05

## 2023-12-05 RX ORDER — NAPROXEN 500 MG/1
500 TABLET ORAL
Qty: 30 TABLET | Refills: 0 | Status: SHIPPED | OUTPATIENT
Start: 2023-12-05 | End: 2023-12-20

## 2023-12-05 RX ADMIN — KETOROLAC TROMETHAMINE 30 MG: 30 INJECTION, SOLUTION INTRAMUSCULAR at 20:41

## 2023-12-05 RX ADMIN — IOHEXOL 75 ML: 350 INJECTION, SOLUTION INTRAVENOUS at 22:01

## 2023-12-05 RX ADMIN — ONDANSETRON 4 MG: 2 INJECTION INTRAMUSCULAR; INTRAVENOUS at 20:41

## 2023-12-05 ASSESSMENT — COLUMBIA-SUICIDE SEVERITY RATING SCALE - C-SSRS
6. HAVE YOU EVER DONE ANYTHING, STARTED TO DO ANYTHING, OR PREPARED TO DO ANYTHING TO END YOUR LIFE?: NO
1. IN THE PAST MONTH, HAVE YOU WISHED YOU WERE DEAD OR WISHED YOU COULD GO TO SLEEP AND NOT WAKE UP?: NO
2. HAVE YOU ACTUALLY HAD ANY THOUGHTS OF KILLING YOURSELF?: NO

## 2023-12-05 ASSESSMENT — PAIN SCALES - GENERAL
PAINLEVEL_OUTOF10: 7
PAINLEVEL_OUTOF10: 7

## 2023-12-05 ASSESSMENT — PAIN - FUNCTIONAL ASSESSMENT
PAIN_FUNCTIONAL_ASSESSMENT: 0-10
PAIN_FUNCTIONAL_ASSESSMENT: 0-10

## 2023-12-06 VITALS
SYSTOLIC BLOOD PRESSURE: 125 MMHG | OXYGEN SATURATION: 100 % | HEIGHT: 60 IN | BODY MASS INDEX: 41.43 KG/M2 | TEMPERATURE: 98.6 F | WEIGHT: 211 LBS | DIASTOLIC BLOOD PRESSURE: 76 MMHG | HEART RATE: 66 BPM | RESPIRATION RATE: 16 BRPM

## 2023-12-06 NOTE — ED PROVIDER NOTES
HPI   Chief Complaint   Patient presents with    Abdominal Pain     Pt coming with with right flank pain going into her abd. Denies n/v/d.        History of present illness: 58-year-old female complains of flank and abdominal pain for the past few days.  Says the pain is mostly in the right upper quadrant region.  Says the pain has been intermittent but became more persistent today.  She has associated nausea without vomiting.  Recently has had some constipation with a hard bowel movement yesterday.  Has some frequent urination associated.  Pain is worse to the touch and worse with movement.  She was seen in urgent care prior to arrival and had a urinalysis which was unremarkable.  She was told to go to the emergency department for further evaluation.  Denies any diarrhea, rectal bleeding, vaginal eating, vaginal discharge.  Her last menstrual period was 2 years ago.    Review of systems: Constitutional, eye, ENT, cardiovascular, respiratory, gastrointestinal, genitourinary, neurologic, musculoskeletal, dermatologic, hematologic, endocrine systems were evaluated and were negative unless otherwise specified in history of present illness.    Medications: Reviewed and per nursing note.    Family history: Denies relevant medical conditions.    Past medical history: None per patient    Social history: Denies tobacco, alcohol, drug use.        Physical exam:    Appearance: Well-developed, well-nourished, nontoxic-appearing, alert and oriented x3. Talking in complete sentences.    HEENT:  Head normocephalic atraumatic, extraocular movements intact, pupils equal round reactive to light, mucous membranes are moist and pink.    NECK:  Nml Inspection, no meningismus, no thyromegaly, no lymphadenopathy, no JVD, trachea is midline.    Respiratory: Clear to auscultation bilaterally with normal bilateral excursion. No wheezes, rhonchi, crackles.    Cardiovascular: Regular rate and rhythm, no murmurs, rubs or gallops. Pulses 2+  symmetrically in the dorsalis pedis and radial pulses.    Abdomen/GI: Right upper quadrant tenderness noted with no rebound guarding rigidity.  Soft, nondistended, normal bowel sounds x4. No masses or organomegaly.    : Slight right CVA tenderness    Neuro:  Oriented x 3, Speech Clear, cranial nerves grossly intact. Normal sensation to light touch in all 4 extremities.    Musculoskeletal: Patient spontaneously moves all 4 extremities.    Skin:  No cyanosis, clubbing, edema, open wounds, or rashes.                          No data recorded                Patient History   Past Medical History:   Diagnosis Date    Other conditions influencing health status 2013    Familial Combined Hyperlipidemia    Personal history of other diseases of the circulatory system     History of hypertension    Personal history of other diseases of the respiratory system     History of asthma    Type 2 diabetes mellitus without complications (CMS/[facility]) 2022    Diabetes mellitus     Past Surgical History:   Procedure Laterality Date    APPENDECTOMY  10/07/2015    Appendectomy     SECTION, CLASSIC  10/07/2015     Section    CHOLECYSTECTOMY  2016    Cholecystectomy Laparoscopic    OTHER SURGICAL HISTORY  2016    Abdominal Paracentesis - Subsequent     Family History   Problem Relation Name Age of Onset    Diabetes Mother      Coronary artery disease Mother          Premature CAD; Mother had stents age late 40's    Diabetes Father      Diabetes Brother      Coronary artery disease Brother          premature CAD    Hypertension Other       Social History     Tobacco Use    Smoking status: Former     Packs/day: 0.25     Years: 0.50     Additional pack years: 0.00     Total pack years: 0.13     Types: Cigarettes    Smokeless tobacco: Never   Substance Use Topics    Alcohol use: Yes     Alcohol/week: 1.0 standard drink of alcohol     Types: 1 Glasses of wine per week    Drug use: Never       Physical Exam    ED Triage Vitals [12/05/23 1935]   Temp Heart Rate Resp BP   36.9 °C (98.4 °F) 93 17 136/78      SpO2 Temp Source Heart Rate Source Patient Position   96 % Oral Monitor Sitting      BP Location FiO2 (%)     Left arm --       Physical Exam    ED Course & MDM   Diagnoses as of 12/05/23 2201   Right upper quadrant abdominal pain       Medical Decision Making  Labs Reviewed  CBC WITH AUTO DIFFERENTIAL - Abnormal     WBC                           12.3 (*)               nRBC                          0.0                    RBC                           4.74                   Hemoglobin                    13.0                   Hematocrit                    39.5                   MCV                           83                     MCH                           27.4                   MCHC                          32.9                   RDW                           14.4                   Platelets                     290                    Neutrophils %                 47.6                   Immature Granulocytes %, Automated   0.5                    Lymphocytes %                 44.0                   Monocytes %                   5.7                    Eosinophils %                 2.0                    Basophils %                   0.2                    Neutrophils Absolute          5.86                   Immature Granulocytes Absolute, Au*   0.06                   Lymphocytes Absolute          5.41 (*)               Monocytes Absolute            0.70                   Eosinophils Absolute          0.24                   Basophils Absolute            0.03                COMPREHENSIVE METABOLIC PANEL - Normal     Glucose                       95                     Sodium                        136                    Potassium                     4.0                    Chloride                      101                    Bicarbonate                   27                     Anion Gap                     12                      Urea Nitrogen                 15                     Creatinine                    0.80                   eGFR                          87                     Calcium                       9.4                    Albumin                       4.1                    Alkaline Phosphatase          53                     Total Protein                 7.1                    AST                           26                     Bilirubin, Total              0.5                    ALT                           33                  LIPASE - Normal     Lipase                        81                         Narrative: Venipuncture immediately after or during the administration of Metamizole may lead to falsely low results. Testing should be performed immediately prior to Metamizole dosing.  URINALYSIS WITH REFLEX MICROSCOPIC AND CULTURE - Normal     Color, Urine                  Yellow                 Appearance, Urine             Clear                  Specific Gravity, Urine       1.015                  pH, Urine                     7.0                    Protein, Urine                NEGATIVE                Glucose, Urine                NEGATIVE                Blood, Urine                  NEGATIVE                Ketones, Urine                NEGATIVE                Bilirubin, Urine              NEGATIVE                Urobilinogen, Urine           <2.0                   Nitrite, Urine                NEGATIVE                Leukocyte Esterase, Urine     NEGATIVE             URINALYSIS WITH REFLEX MICROSCOPIC AND CULTURE         Narrative: The following orders were created for panel order Urinalysis with Reflex Microscopic and Culture.                  Procedure                               Abnormality         Status                                     ---------                               -----------         ------                                     Urinalysis with Reflex M...[[id number]]  Normal               Final result                               Extra Urine Gray Tube[[id number]]                                                                                         Please view results for these tests on the individual orders.  HCG, URINE, QUALITATIVE      Patient complains of flank and abdominal pain.  Differential diagnosis of kidney stones, pyelonephritis, complicated UTI, constipation, cholangitis, diverticulitis, inflammatory bowel disease.  Examination shows right upper quadrant tenderness primarily.  She has some nausea.  Status post appendectomy and cholecystectomy per patient.  She was seen in urgent care and had a normal urine dip and was sent for further evaluation.    CBC CMP lipase urinalysis hCG CT and pelvis ordered.  Given Toradol and Zofran IV.    Blood work shows CBC with white blood cell count of 12.3 with normal hemoglobin hematocrit.  Chemistry showed normal electrolytes with normal renal and liver function.  Urinalysis unremarkable.  CT scan pending.  Patient's pain did improve with medications.    Patient care is signed out to colleague Mika Navas PA-C pending CT results and reevaluation of patient case.  Working diagnosis of constipation to rule out other intra-abdominal pathology.        Procedure  Procedures     Nelson Flower PA-C  12/05/23 [id number]

## 2023-12-06 NOTE — PROGRESS NOTES
Kim Yi is a 58 y.o. female referred from urgent care for right upper quadrant right side pain  Subjective   Patient with tenderness in the right lower back also had abdominal pain CT scan was unremarkable history of cholecystectomy.  Discussed findings and plan for discharge patient is comfortable with plan understands return precautions.       Objective     Physical Exam  Vitals reviewed.   Constitutional:       General: She is not in acute distress.     Appearance: Normal appearance. She is normal weight. She is not ill-appearing, toxic-appearing or diaphoretic.   HENT:      Head: Normocephalic and atraumatic.      Right Ear: External ear normal.      Left Ear: External ear normal.      Nose: Nose normal.      Mouth/Throat:      Mouth: Mucous membranes are moist.      Pharynx: Oropharynx is clear.   Eyes:      Extraocular Movements: Extraocular movements intact.      Conjunctiva/sclera: Conjunctivae normal.      Pupils: Pupils are equal, round, and reactive to light.   Cardiovascular:      Rate and Rhythm: Normal rate and regular rhythm.   Pulmonary:      Effort: Pulmonary effort is normal. No respiratory distress.      Breath sounds: No stridor.   Abdominal:      General: There is no distension.      Tenderness: There is no abdominal tenderness. There is no guarding or rebound. Negative signs include Marques's sign.   Musculoskeletal:         General: No swelling or deformity.   Skin:     Capillary Refill: Capillary refill takes less than 2 seconds.      Findings: No rash.   Neurological:      General: No focal deficit present.      Mental Status: She is alert and oriented to person, place, and time. Mental status is at baseline.   Psychiatric:         Mood and Affect: Mood normal.         Behavior: Behavior normal.         Thought Content: Thought content normal.         Judgment: Judgment normal.         Last Recorded Vitals  Blood pressure 136/78, pulse 93, temperature 36.9 °C (98.4 °F), temperature  source Oral, resp. rate 17, height 1.524 m (5'), weight 95.7 kg (211 lb), SpO2 96 %.  Intake/Output last 3 Shifts:  No intake/output data recorded.    Relevant Results                             Assessment/Plan   Active Problems:  There are no active Hospital Problems.    1. Right upper quadrant abdominal pain        2. Acute right-sided low back pain without sciatica  cyclobenzaprine (Flexeril) 10 mg tablet    naproxen (Naprosyn) 500 mg tablet          Mika Navas PA-C

## 2023-12-29 DIAGNOSIS — E11.69 TYPE 2 DIABETES MELLITUS WITH OTHER SPECIFIED COMPLICATION, WITHOUT LONG-TERM CURRENT USE OF INSULIN (MULTI): ICD-10-CM

## 2024-01-02 RX ORDER — SEMAGLUTIDE 0.68 MG/ML
0.25 INJECTION, SOLUTION SUBCUTANEOUS
Qty: 3 ML | Refills: 1 | Status: SHIPPED | OUTPATIENT
Start: 2024-01-02 | End: 2024-03-04

## 2024-01-29 ENCOUNTER — OFFICE VISIT (OUTPATIENT)
Dept: PRIMARY CARE | Facility: CLINIC | Age: 59
End: 2024-01-29
Payer: COMMERCIAL

## 2024-01-29 ENCOUNTER — APPOINTMENT (OUTPATIENT)
Dept: DERMATOLOGY | Facility: CLINIC | Age: 59
End: 2024-01-29
Payer: COMMERCIAL

## 2024-01-29 VITALS
TEMPERATURE: 96.8 F | DIASTOLIC BLOOD PRESSURE: 85 MMHG | WEIGHT: 206.4 LBS | BODY MASS INDEX: 40.31 KG/M2 | SYSTOLIC BLOOD PRESSURE: 130 MMHG

## 2024-01-29 DIAGNOSIS — E11.69 TYPE 2 DIABETES MELLITUS WITH OTHER SPECIFIED COMPLICATION, WITHOUT LONG-TERM CURRENT USE OF INSULIN (MULTI): ICD-10-CM

## 2024-01-29 DIAGNOSIS — J45.30 MILD PERSISTENT ASTHMA WITHOUT COMPLICATION (HHS-HCC): ICD-10-CM

## 2024-01-29 DIAGNOSIS — I10 PRIMARY HYPERTENSION: ICD-10-CM

## 2024-01-29 DIAGNOSIS — E78.5 DYSLIPIDEMIA: ICD-10-CM

## 2024-01-29 DIAGNOSIS — E66.01 MORBID OBESITY WITH BMI OF 40.0-44.9, ADULT (MULTI): Primary | ICD-10-CM

## 2024-01-29 PROBLEM — J04.0 ACUTE LARYNGITIS: Status: RESOLVED | Noted: 2023-01-20 | Resolved: 2024-01-29

## 2024-01-29 PROBLEM — J02.9 SORE THROAT: Status: RESOLVED | Noted: 2023-01-20 | Resolved: 2024-01-29

## 2024-01-29 PROBLEM — R35.0 URINARY FREQUENCY: Status: RESOLVED | Noted: 2023-01-20 | Resolved: 2024-01-29

## 2024-01-29 PROBLEM — M54.9 BACK PAIN: Status: RESOLVED | Noted: 2023-01-20 | Resolved: 2024-01-29

## 2024-01-29 PROBLEM — E78.2 COMBINED HYPERLIPIDEMIA: Status: RESOLVED | Noted: 2023-01-20 | Resolved: 2024-01-29

## 2024-01-29 PROBLEM — M17.10 ARTHRITIS OF KNEE: Status: RESOLVED | Noted: 2023-01-20 | Resolved: 2024-01-29

## 2024-01-29 PROBLEM — M54.2 NECK PAIN: Status: RESOLVED | Noted: 2023-01-20 | Resolved: 2024-01-29

## 2024-01-29 PROBLEM — M25.562 CHRONIC PAIN OF LEFT KNEE: Status: RESOLVED | Noted: 2023-01-20 | Resolved: 2024-01-29

## 2024-01-29 PROBLEM — K92.1 BLOOD IN STOOL: Status: RESOLVED | Noted: 2023-01-20 | Resolved: 2024-01-29

## 2024-01-29 PROBLEM — S39.012A LUMBOSACRAL STRAIN: Status: RESOLVED | Noted: 2023-01-20 | Resolved: 2024-01-29

## 2024-01-29 PROBLEM — J06.9 VIRAL UPPER RESPIRATORY INFECTION: Status: RESOLVED | Noted: 2023-01-20 | Resolved: 2024-01-29
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
## 2024-01-29 PROBLEM — M25.569 KNEE PAIN: Status: RESOLVED | Noted: 2023-01-20 | Resolved: 2024-01-29

## 2024-01-29 PROBLEM — J45.21 MILD INTERMITTENT ASTHMA WITH ACUTE EXACERBATION (HHS-HCC): Status: RESOLVED | Noted: 2023-01-20 | Resolved: 2024-01-29

## 2024-01-29 PROBLEM — S89.92XA INJURY OF LEFT KNEE: Status: RESOLVED | Noted: 2023-01-20 | Resolved: 2024-01-29

## 2024-01-29 PROBLEM — S29.012A UPPER BACK STRAIN: Status: RESOLVED | Noted: 2023-01-20 | Resolved: 2024-01-29

## 2024-01-29 PROBLEM — J20.9 ACUTE BRONCHITIS WITH BRONCHOSPASM: Status: RESOLVED | Noted: 2023-01-20 | Resolved: 2024-01-29

## 2024-01-29 PROBLEM — R10.9 ABDOMINAL PAIN OF MULTIPLE SITES: Status: RESOLVED | Noted: 2023-01-20 | Resolved: 2024-01-29

## 2024-01-29 PROBLEM — M25.561 ACUTE PAIN OF RIGHT KNEE: Status: RESOLVED | Noted: 2023-01-20 | Resolved: 2024-01-29

## 2024-01-29 PROBLEM — R07.9 CHEST PAIN: Status: RESOLVED | Noted: 2023-01-20 | Resolved: 2024-01-29

## 2024-01-29 PROBLEM — M25.562 ACUTE PAIN OF LEFT KNEE: Status: RESOLVED | Noted: 2023-01-20 | Resolved: 2024-01-29

## 2024-01-29 PROBLEM — M79.89 LEFT LEG SWELLING: Status: RESOLVED | Noted: 2023-01-20 | Resolved: 2024-01-29

## 2024-01-29 PROBLEM — S39.012A STRAIN OF LUMBAR PARASPINAL MUSCLE, INITIAL ENCOUNTER: Status: RESOLVED | Noted: 2023-01-20 | Resolved: 2024-01-29

## 2024-01-29 PROBLEM — J45.901 EXACERBATION OF ASTHMA (HHS-HCC): Status: RESOLVED | Noted: 2023-01-20 | Resolved: 2024-01-29

## 2024-01-29 PROBLEM — G89.29 CHRONIC PAIN OF LEFT KNEE: Status: RESOLVED | Noted: 2023-01-20 | Resolved: 2024-01-29

## 2024-01-29 PROBLEM — M77.9 BONY SPUR: Status: RESOLVED | Noted: 2023-01-20 | Resolved: 2024-01-29

## 2024-01-29 PROBLEM — J06.9 VIRAL URI WITH COUGH: Status: RESOLVED | Noted: 2023-01-20 | Resolved: 2024-01-29

## 2024-01-29 PROBLEM — M62.838 MUSCLE SPASM: Status: RESOLVED | Noted: 2023-01-20 | Resolved: 2024-01-29

## 2024-01-29 PROBLEM — M54.6 THORACIC BACK PAIN: Status: RESOLVED | Noted: 2023-01-20 | Resolved: 2024-01-29

## 2024-01-29 PROBLEM — S29.012A STRAIN OF THORACIC SPINE: Status: RESOLVED | Noted: 2023-01-20 | Resolved: 2024-01-29

## 2024-01-29 PROCEDURE — 3075F SYST BP GE 130 - 139MM HG: CPT | Performed by: INTERNAL MEDICINE

## 2024-01-29 PROCEDURE — 99213 OFFICE O/P EST LOW 20 MIN: CPT | Performed by: INTERNAL MEDICINE

## 2024-01-29 PROCEDURE — 3008F BODY MASS INDEX DOCD: CPT | Performed by: INTERNAL MEDICINE

## 2024-01-29 PROCEDURE — 4010F ACE/ARB THERAPY RXD/TAKEN: CPT | Performed by: INTERNAL MEDICINE

## 2024-01-29 PROCEDURE — 3079F DIAST BP 80-89 MM HG: CPT | Performed by: INTERNAL MEDICINE

## 2024-01-29 PROCEDURE — 1036F TOBACCO NON-USER: CPT | Performed by: INTERNAL MEDICINE

## 2024-01-29 ASSESSMENT — ENCOUNTER SYMPTOMS
BLOOD IN STOOL: 0
HEADACHES: 0
ARTHRALGIAS: 0
CONSTIPATION: 1
FATIGUE: 0
COUGH: 0
PALPITATIONS: 0
MYALGIAS: 0
SHORTNESS OF BREATH: 0
FEVER: 0
BACK PAIN: 0
ABDOMINAL PAIN: 0
DIZZINESS: 0
CHILLS: 0
NECK PAIN: 0
DIARRHEA: 0

## 2024-01-29 NOTE — PATIENT INSTRUCTIONS
You are seen today for follow-up visit  Continue Ozempic and increase the dose to 0.5 mg weekly  Continue metformin as ordered  Check hemoglobin A1c in the lab  Follow-up in 3 to 4 months

## 2024-01-29 NOTE — PROGRESS NOTES
Subjective   Patient ID: Kim Yi is a 58 y.o. female.    HPI patient is seen today for follow-up on her weight.  She has hypertension, diabetes, hyperlipidemia.  She is currently taking Ozempic for last 2 to 3 months and has lost 20 pounds.  She is trying to walk in place with exercise videos.  She denies any concerns.    Review of Systems   Constitutional:  Negative for chills, fatigue and fever.   Respiratory:  Negative for cough and shortness of breath.    Cardiovascular:  Negative for chest pain, palpitations and leg swelling.   Gastrointestinal:  Positive for constipation. Negative for abdominal pain, blood in stool and diarrhea.   Endocrine: Negative for cold intolerance and heat intolerance.   Musculoskeletal:  Negative for arthralgias, back pain, myalgias and neck pain.   Neurological:  Negative for dizziness and headaches.       Objective   Physical Exam  Vitals reviewed.   Constitutional:       General: She is not in acute distress.     Appearance: Normal appearance.   HENT:      Head: Normocephalic and atraumatic.   Cardiovascular:      Rate and Rhythm: Normal rate and regular rhythm.      Pulses: Normal pulses.   Pulmonary:      Effort: Pulmonary effort is normal. No respiratory distress.      Breath sounds: Normal breath sounds.   Abdominal:      General: Bowel sounds are normal. There is no distension.      Tenderness: There is no abdominal tenderness.   Musculoskeletal:         General: No swelling or tenderness. Normal range of motion.      Cervical back: Normal range of motion.   Skin:     General: Skin is warm.   Neurological:      General: No focal deficit present.      Mental Status: She is alert.      Coordination: Coordination normal.      Gait: Gait normal.   Psychiatric:         Mood and Affect: Mood normal.         Behavior: Behavior normal.         Assessment/Plan   Diagnoses and all orders for this visit:  Morbid obesity with BMI of 40.0-44.9, adult  (CMS/[facility])  Dyslipidemia  Primary hypertension  Comments:  Well-controlled  Continue with losartan and Maxide  Type 2 diabetes mellitus with other specified complication, without long-term current use of insulin (CMS/[facility])  Comments:  Increase Ozempic to 05 mg injection weekly  Continue metformin, glimepiride  Eat a healthy diet and exercise  Orders:  -     Hemoglobin A1c; Future  Mild persistent asthma without complication  Comments:  Stable    Follow-up in 3 to 4 months for physical exam

## 2024-01-30 ENCOUNTER — LAB (OUTPATIENT)
Dept: LAB | Facility: LAB | Age: 59
End: 2024-01-30
Payer: COMMERCIAL

## 2024-01-30 DIAGNOSIS — E11.69 TYPE 2 DIABETES MELLITUS WITH OTHER SPECIFIED COMPLICATION, WITHOUT LONG-TERM CURRENT USE OF INSULIN (MULTI): ICD-10-CM

## 2024-01-30 LAB
EST. AVERAGE GLUCOSE BLD GHB EST-MCNC: 120 MG/DL
HBA1C MFR BLD: 5.8 %

## 2024-01-30 PROCEDURE — 83036 HEMOGLOBIN GLYCOSYLATED A1C: CPT

## 2024-01-30 PROCEDURE — 36415 COLL VENOUS BLD VENIPUNCTURE: CPT

## 2024-02-12 ENCOUNTER — OFFICE VISIT (OUTPATIENT)
Dept: DERMATOLOGY | Facility: CLINIC | Age: 59
End: 2024-02-12
Payer: COMMERCIAL

## 2024-02-12 ENCOUNTER — LAB (OUTPATIENT)
Dept: LAB | Facility: LAB | Age: 59
End: 2024-02-12
Payer: COMMERCIAL

## 2024-02-12 DIAGNOSIS — L65.0 TELOGEN EFFLUVIUM: Primary | ICD-10-CM

## 2024-02-12 DIAGNOSIS — L65.9 ALOPECIA: ICD-10-CM

## 2024-02-12 LAB
DHEA-S SERPL-MCNC: 66 UG/DL (ref 30–260)
ESTRADIOL SERPL-MCNC: 19 PG/ML
FOLATE SERPL-MCNC: 14.4 NG/ML
LH SERPL-ACNC: 32.5 IU/L
T3FREE SERPL-MCNC: 3 PG/ML (ref 2.3–4.2)
T4 FREE SERPL-MCNC: 1.24 NG/DL (ref 0.78–1.48)
TESTOST SERPL-MCNC: <30 NG/DL (ref 0–70)
THYROPEROXIDASE AB SERPL-ACNC: 36 IU/ML

## 2024-02-12 PROCEDURE — 86376 MICROSOMAL ANTIBODY EACH: CPT

## 2024-02-12 PROCEDURE — 36415 COLL VENOUS BLD VENIPUNCTURE: CPT

## 2024-02-12 PROCEDURE — 82627 DEHYDROEPIANDROSTERONE: CPT

## 2024-02-12 PROCEDURE — 84403 ASSAY OF TOTAL TESTOSTERONE: CPT

## 2024-02-12 PROCEDURE — 82746 ASSAY OF FOLIC ACID SERUM: CPT

## 2024-02-12 PROCEDURE — 84439 ASSAY OF FREE THYROXINE: CPT

## 2024-02-12 PROCEDURE — 84481 FREE ASSAY (FT-3): CPT

## 2024-02-12 PROCEDURE — 3044F HG A1C LEVEL LT 7.0%: CPT

## 2024-02-12 PROCEDURE — 83002 ASSAY OF GONADOTROPIN (LH): CPT

## 2024-02-12 PROCEDURE — 99203 OFFICE O/P NEW LOW 30 MIN: CPT

## 2024-02-12 PROCEDURE — 3008F BODY MASS INDEX DOCD: CPT

## 2024-02-12 PROCEDURE — 82670 ASSAY OF TOTAL ESTRADIOL: CPT

## 2024-02-12 PROCEDURE — 82157 ASSAY OF ANDROSTENEDIONE: CPT

## 2024-02-12 PROCEDURE — 1036F TOBACCO NON-USER: CPT

## 2024-02-12 PROCEDURE — 11900 INJECT SKIN LESIONS </W 7: CPT

## 2024-02-12 PROCEDURE — 4010F ACE/ARB THERAPY RXD/TAKEN: CPT

## 2024-02-12 RX ORDER — FLUOCINONIDE TOPICAL SOLUTION USP, 0.05% 0.5 MG/ML
SOLUTION TOPICAL
Qty: 60 ML | Refills: 11 | Status: SHIPPED | OUTPATIENT
Start: 2024-02-12

## 2024-02-12 NOTE — PROGRESS NOTES
Hair loss  -started about 5 years   -tried otc minoxidil and saw minimal growth  -tried otc oils that didn't help either  -doesn't wear matthias too often  -Started June 2023 stopped doing matthias  -mom had alopecia  -maternal grandma had thinning hair but still had hair     Subjective   HPI: Kim Yi is a 58 y.o. female is a new patient here for evaluation and treatment of hair loss.     ROS: No other skin or systemic complaints other than what is documented elsewhere in the note.    ALLERGIES: Other, Oxycodone-acetaminophen, and Tramadol    SOCIAL:  reports that she has quit smoking. Her smoking use included cigarettes. She has a 0.13 pack-year smoking history. She has never used smokeless tobacco. She reports current alcohol use of about 1.0 standard drink of alcohol per week. She reports that she does not use drugs.    Objective   Scalp  Significant widening of the central port as there is a large hairless patch covering the top of the scalp.  Throughout the hairless patch there are patches of normal hair growth        Assessment/Plan   1. Alopecia  Scalp    Discussed alopecia with patient.  I recommended doing a hair panel as well as intralesional Kenalog injections as well as topical fluocinonide solution.  I like to do a trial of this for a couple of months.  If needed we can perform a punch biopsy later on.    Related Medications  triamcinolone acetonide (Kenalog) injection 10 mg      fluocinonide (Lidex) 0.05 % external solution  Apply topically to scalp BID.         FOLLOW UP: 6 weeks    The patient was encouraged to contact me with any further questions or concerns.  Emily Augustin PA-C  2/12/2024

## 2024-02-16 LAB — ANDROST SERPL-MCNC: 0.56 NG/ML (ref 0.13–0.82)

## 2024-03-03 DIAGNOSIS — E11.69 TYPE 2 DIABETES MELLITUS WITH OTHER SPECIFIED COMPLICATION, WITHOUT LONG-TERM CURRENT USE OF INSULIN (MULTI): ICD-10-CM

## 2024-03-04 RX ORDER — SEMAGLUTIDE 0.68 MG/ML
0.25 INJECTION, SOLUTION SUBCUTANEOUS
Qty: 3 ML | Refills: 1 | Status: SHIPPED | OUTPATIENT
Start: 2024-03-04 | End: 2024-04-15 | Stop reason: WASHOUT

## 2024-03-05 ENCOUNTER — OFFICE VISIT (OUTPATIENT)
Dept: ORTHOPEDIC SURGERY | Facility: CLINIC | Age: 59
End: 2024-03-05
Payer: COMMERCIAL

## 2024-03-05 VITALS — WEIGHT: 202 LBS | BODY MASS INDEX: 39.66 KG/M2 | HEIGHT: 60 IN

## 2024-03-05 DIAGNOSIS — M17.12 PRIMARY OSTEOARTHRITIS OF LEFT KNEE: Primary | ICD-10-CM

## 2024-03-05 PROCEDURE — 1036F TOBACCO NON-USER: CPT | Performed by: STUDENT IN AN ORGANIZED HEALTH CARE EDUCATION/TRAINING PROGRAM

## 2024-03-05 PROCEDURE — 3044F HG A1C LEVEL LT 7.0%: CPT | Performed by: STUDENT IN AN ORGANIZED HEALTH CARE EDUCATION/TRAINING PROGRAM

## 2024-03-05 PROCEDURE — 99214 OFFICE O/P EST MOD 30 MIN: CPT | Performed by: STUDENT IN AN ORGANIZED HEALTH CARE EDUCATION/TRAINING PROGRAM

## 2024-03-05 PROCEDURE — 3008F BODY MASS INDEX DOCD: CPT | Performed by: STUDENT IN AN ORGANIZED HEALTH CARE EDUCATION/TRAINING PROGRAM

## 2024-03-05 PROCEDURE — 4010F ACE/ARB THERAPY RXD/TAKEN: CPT | Performed by: STUDENT IN AN ORGANIZED HEALTH CARE EDUCATION/TRAINING PROGRAM

## 2024-03-05 NOTE — PROGRESS NOTES
Chief complaint: Bilateral knee pain    HPI: KINSEY MAYFIELD is a pleasant 58 y.o. female who is seen today for follow-up evaluation of bilateral knee pain. I last saw the patient in February.  At that time, her body mass index and hemoglobin A1c precluded her from consideration of total knee arthroplasty.  We did proceed with giving her intra-articular injections.  The injections unfortunately only lasted 2 weeks.  Since that time, she has had worsening of her bilateral knee pain.  She does complain of frequent instability and has fallen.  The patient works as a teacher at a school.  The elevator has been out for the past several weeks.  She has been forced to ascend and descend stairs which is very difficult for her.  She does feel unstable and has had difficulty doing so.  She has tried other conservative treatments including over-the-counter nonsteroidal anti-inflammatories and meloxicam.  She has not yet tried hyaluronic acid injections.  She was recently started on Ozempic for her diabetes and weight.    Update 3/5:  Injections at last visit in November provided good relief for several months. However her pain has returned. She has diligently lost weight with ozempic and her BMI is now under 40. Last hemoglobin A1c from January 2024 is 5.8%     Objective:  BMI: 39.45  General: Well-appearing female in no acute distress. Awake, alert and oriented. Pleasant and cooperative.  Respiratory: Non-labored breathing  Mood: Euthymic   Gait: Antalgic  Assistive Device: None      Affected Right Knee  Limb Alignment: Mild varus  ROM: 0-95  Stable to varus and valgus stress at full extension and 30 degrees of flexion. Fixed deformity. Not correctable  Skin: Intact, no abrasions or draining sinuses  Effusion: None  Quad Strength: 5/5  Hamstring Strength: 5/5  Patella Crepitus: None  Patella Grind: Positive  Tenderness: Medial and lateral joint line  Sensation: Intact to light touch distally  Motor function: Able to fire TA,  EHL, G/S  Pulses: Not palpable     Affected Left Knee  Limb Alignment: Mild varus  ROM: 5-110  Stable to varus and valgus stress at full extension and 30 degrees of flexion  Skin: Intact, no abrasions or draining sinuses  Effusion: None  Quad Strength: 5/5  Hamstring Strength: 5/5  Patella Crepitus: N positive present one  Patella Grind: Negative  Tenderness: Medial and lateral joint line  Sensation: Intact to light touch distally  Motor function: Able to fire TA, EHL, G/S  Pulses: Not palpable     Imaging:   AP/ lateral/ mid-flexion/sunrise views: Independent review of left knee x-rays was performed. The findings were reviewed with the patient. There are severe degenerative changes of the left knee with varus limb alignment. There is joint space narrowing, subchondral sclerosis, and osteophyte formation. No evidence of fracture, AVN, dislocation, osteomyelitis.     Assessment and plan  Kim Yi for more than six months has had limited function as well as persistent and severe pain which has negatively impacted the quality of life and interfered with activities of daily living. Under my care or the care of other providers, for greater than the three months, conservative treatment including activity modification, over the counter pain medications, injections, physical therapy and/or recommended home exercise program, have provided only minimal relief. The patient has not had an intra-articular injection in the past 3 months. The option to continue with conservative measures in lieu of arthroplasty was discussed and offered. However, given the failure of these conservative measures and the clinical and radiographic evidence of end-stage arthritis, the patient is a good candidate for an elective total knee arthroplasty. The stated potential benefits include pain relief and improved function were discussed but no guarantees were offered. Some patients may experience improved range of motion but pre-operative  range of motion remains the strongest predictor of post-operative range of motion.     Her BMI and A1c are now within appropriate parameters for surgery.    Will proceed with L TKA    The patient was seen and examined with my resident Dr. Taylor who assisted with documentation.  I independently interviewed the patient to obtain a history and performed physical examination.  I formulated the plan of care.    * This office note was dictated using Dragon voice to text software and was not proofread for spelling or grammatical errors *

## 2024-03-13 DIAGNOSIS — M17.12 PRIMARY OSTEOARTHRITIS OF LEFT KNEE: ICD-10-CM

## 2024-03-19 NOTE — PROGRESS NOTES
Subjective   HPI: Kim Yi is a 59 y.o. female who presents in office for follow up evaluation and treatment of alopecia.  At last visit she was given intralesional injections and she has noticed some hair regrowth in the area that was injected. She has been experiencing alopecia for 5 years    ROS: No other skin or systemic complaints other than what is documented elsewhere in the note.    ALLERGIES: Other, Oxycodone-acetaminophen, and Tramadol    SOCIAL:  reports that she has quit smoking. Her smoking use included cigarettes. She has a 0.1 pack-year smoking history. She has never used smokeless tobacco. She reports that she does not currently use alcohol. She reports that she does not use drugs.    Objective   Scalp  Significant widening of the central part as well as there is a large hairless patch covering the top of the scalp throughout the hairless patch there are patches of normal hair growth.  Hair regrowth is present in the area that was previously injected with intralesional Kenalog.    SALT score 65          Assessment/Plan   1. Alopecia areata  Scalp    Continue fluocinonide solution.      Patient is interested in Olumiant.  I pulled up the prescribers website went through the listed possible side effects of Juan Alberto inhibitors.  Patient verbalizes understanding and wishes to try this medication.    I am ordering basic labs.  The Olumiant paperwork has been filed.  Today we will do intralesional Kenalog injections.        Related Procedures  Hepatitis B Core Antibody, IgM  Hepatitis B Surface Antibody  Hepatitis B Surface Antigen  Hepatitis C Antibody  HIV 1/2 Antigen/Antibody Screen with Reflex to Confirmation  T-Spot TB    Related Medications  baricitinib (Olumiant) 4 mg tablet tablet  Take 1 tablet (4 mg) by mouth once daily.         FOLLOW UP: 6 weeks    The patient was encouraged to contact me with any further questions or concerns.  Emily Augustin PA-C  4/25/2024

## 2024-03-25 ENCOUNTER — OFFICE VISIT (OUTPATIENT)
Dept: DERMATOLOGY | Facility: CLINIC | Age: 59
End: 2024-03-25
Payer: COMMERCIAL

## 2024-03-25 DIAGNOSIS — L63.9 ALOPECIA AREATA: Primary | ICD-10-CM

## 2024-03-25 PROCEDURE — 4010F ACE/ARB THERAPY RXD/TAKEN: CPT

## 2024-03-25 PROCEDURE — 3008F BODY MASS INDEX DOCD: CPT

## 2024-03-25 PROCEDURE — 11900 INJECT SKIN LESIONS </W 7: CPT

## 2024-03-25 PROCEDURE — 3044F HG A1C LEVEL LT 7.0%: CPT

## 2024-03-25 PROCEDURE — 99213 OFFICE O/P EST LOW 20 MIN: CPT

## 2024-03-31 DIAGNOSIS — E11.69 TYPE 2 DIABETES MELLITUS WITH OTHER SPECIFIED COMPLICATION, WITHOUT LONG-TERM CURRENT USE OF INSULIN (MULTI): ICD-10-CM

## 2024-04-01 RX ORDER — METFORMIN HYDROCHLORIDE 500 MG/1
500 TABLET, EXTENDED RELEASE ORAL 2 TIMES DAILY
Qty: 180 TABLET | Refills: 1 | Status: SHIPPED | OUTPATIENT
Start: 2024-04-01 | End: 2024-06-11 | Stop reason: SDUPTHER

## 2024-04-01 RX ORDER — BARICITINIB 4 MG/1
4 TABLET, FILM COATED ORAL DAILY
Qty: 30 TABLET | Refills: 2 | Status: SHIPPED | OUTPATIENT
Start: 2024-04-01 | End: 2024-05-02

## 2024-04-02 ENCOUNTER — SPECIALTY PHARMACY (OUTPATIENT)
Dept: PHARMACY | Facility: CLINIC | Age: 59
End: 2024-04-02

## 2024-04-15 ENCOUNTER — OFFICE VISIT (OUTPATIENT)
Dept: PRIMARY CARE | Facility: CLINIC | Age: 59
End: 2024-04-15
Payer: COMMERCIAL

## 2024-04-15 ENCOUNTER — LAB (OUTPATIENT)
Dept: LAB | Facility: LAB | Age: 59
End: 2024-04-15
Payer: COMMERCIAL

## 2024-04-15 VITALS
TEMPERATURE: 98 F | WEIGHT: 202 LBS | BODY MASS INDEX: 39.45 KG/M2 | SYSTOLIC BLOOD PRESSURE: 148 MMHG | DIASTOLIC BLOOD PRESSURE: 82 MMHG | HEART RATE: 61 BPM

## 2024-04-15 DIAGNOSIS — I10 PRIMARY HYPERTENSION: Primary | ICD-10-CM

## 2024-04-15 DIAGNOSIS — E78.5 DYSLIPIDEMIA: ICD-10-CM

## 2024-04-15 DIAGNOSIS — I10 ESSENTIAL (PRIMARY) HYPERTENSION: Primary | ICD-10-CM

## 2024-04-15 DIAGNOSIS — M25.562 CHRONIC PAIN OF LEFT KNEE: ICD-10-CM

## 2024-04-15 DIAGNOSIS — L65.9 ALOPECIA: ICD-10-CM

## 2024-04-15 DIAGNOSIS — G89.29 CHRONIC PAIN OF LEFT KNEE: ICD-10-CM

## 2024-04-15 DIAGNOSIS — E11.69 TYPE 2 DIABETES MELLITUS WITH OTHER SPECIFIED COMPLICATION, WITHOUT LONG-TERM CURRENT USE OF INSULIN (MULTI): ICD-10-CM

## 2024-04-15 DIAGNOSIS — I10 PRIMARY HYPERTENSION: ICD-10-CM

## 2024-04-15 LAB — HIV 1+2 AB+HIV1 P24 AG SERPL QL IA: NONREACTIVE

## 2024-04-15 PROCEDURE — 86706 HEP B SURFACE ANTIBODY: CPT

## 2024-04-15 PROCEDURE — 86803 HEPATITIS C AB TEST: CPT

## 2024-04-15 PROCEDURE — 3077F SYST BP >= 140 MM HG: CPT | Performed by: INTERNAL MEDICINE

## 2024-04-15 PROCEDURE — 87340 HEPATITIS B SURFACE AG IA: CPT

## 2024-04-15 PROCEDURE — 86481 TB AG RESPONSE T-CELL SUSP: CPT

## 2024-04-15 PROCEDURE — 3008F BODY MASS INDEX DOCD: CPT | Performed by: INTERNAL MEDICINE

## 2024-04-15 PROCEDURE — 3079F DIAST BP 80-89 MM HG: CPT | Performed by: INTERNAL MEDICINE

## 2024-04-15 PROCEDURE — 99213 OFFICE O/P EST LOW 20 MIN: CPT | Performed by: INTERNAL MEDICINE

## 2024-04-15 PROCEDURE — 4010F ACE/ARB THERAPY RXD/TAKEN: CPT | Performed by: INTERNAL MEDICINE

## 2024-04-15 PROCEDURE — 87389 HIV-1 AG W/HIV-1&-2 AB AG IA: CPT

## 2024-04-15 PROCEDURE — 36415 COLL VENOUS BLD VENIPUNCTURE: CPT

## 2024-04-15 PROCEDURE — 86705 HEP B CORE ANTIBODY IGM: CPT

## 2024-04-15 PROCEDURE — 3044F HG A1C LEVEL LT 7.0%: CPT | Performed by: INTERNAL MEDICINE

## 2024-04-15 ASSESSMENT — ENCOUNTER SYMPTOMS
BLOOD IN STOOL: 0
LIGHT-HEADEDNESS: 0
DIFFICULTY URINATING: 0
COUGH: 0
SHORTNESS OF BREATH: 0
BACK PAIN: 0
APPETITE CHANGE: 0
ACTIVITY CHANGE: 0
CHEST TIGHTNESS: 0
WHEEZING: 0
HEADACHES: 0
ARTHRALGIAS: 1
SLEEP DISTURBANCE: 0
ABDOMINAL PAIN: 0
CHILLS: 0
DYSURIA: 0
FLANK PAIN: 0
DECREASED CONCENTRATION: 0
NECK PAIN: 0
FREQUENCY: 0
SORE THROAT: 0
DIZZINESS: 0
PALPITATIONS: 0
NERVOUS/ANXIOUS: 0
UNEXPECTED WEIGHT CHANGE: 0
WEAKNESS: 0

## 2024-04-15 NOTE — PROGRESS NOTES
"Subjective   Patient ID: Kim Yi is a 59 y.o. female who presents for Follow-up (Pt present today for 4 month follow up. ls).    HPI patient is seen today for follow-up on her weight and diabetes.  She has diabetes\" currently taking Ozempic 0.5 mg and metformin.  She has not been taking metformin regularly as her blood sugars go low.  She has lost 4 pounds since her last visit.  She is scheduled for left knee replacement in 2 months from now.  No other concerns today.    Review of Systems   Constitutional:  Negative for activity change, appetite change, chills and unexpected weight change.   HENT:  Negative for congestion, postnasal drip and sore throat.    Eyes:  Negative for visual disturbance.   Respiratory:  Negative for cough, chest tightness, shortness of breath and wheezing.    Cardiovascular:  Negative for chest pain, palpitations and leg swelling.   Gastrointestinal:  Negative for abdominal pain and blood in stool.   Endocrine: Negative for cold intolerance and heat intolerance.   Genitourinary:  Negative for difficulty urinating, dysuria, flank pain and frequency.   Musculoskeletal:  Positive for arthralgias. Negative for back pain, gait problem and neck pain.   Skin:  Negative for rash.   Allergic/Immunologic: Negative for environmental allergies and food allergies.   Neurological:  Negative for dizziness, weakness, light-headedness and headaches.   Psychiatric/Behavioral:  Negative for decreased concentration and sleep disturbance. The patient is not nervous/anxious.        Objective   /82   Pulse 61   Temp 36.7 °C (98 °F)   Wt 91.6 kg (202 lb)   BMI 39.45 kg/m²     Physical Exam  Vitals reviewed.   Constitutional:       General: She is not in acute distress.     Appearance: Normal appearance.   HENT:      Head: Normocephalic and atraumatic.   Cardiovascular:      Rate and Rhythm: Normal rate and regular rhythm.      Pulses: Normal pulses.   Pulmonary:      Effort: Pulmonary effort is " normal. No respiratory distress.      Breath sounds: Normal breath sounds.   Abdominal:      General: Bowel sounds are normal. There is no distension.      Tenderness: There is no abdominal tenderness.   Musculoskeletal:         General: No swelling or tenderness. Normal range of motion.      Cervical back: Normal range of motion.   Skin:     General: Skin is warm.   Neurological:      General: No focal deficit present.      Mental Status: She is alert.      Coordination: Coordination normal.      Gait: Gait normal.   Psychiatric:         Mood and Affect: Mood normal.         Behavior: Behavior normal.         Assessment/Plan   Diagnoses and all orders for this visit:  Primary hypertension  Comments:  Slightly elevated-monitor at home  Continue losartan and triamterene/HCTZ  Orders:  -     CBC; Future  -     Comprehensive Metabolic Panel; Future  -     TSH with reflex to Free T4 if abnormal; Future  BMI 39.0-39.9,adult  Comments:  BMI is improving  Continue with healthy diet and regular exercise at home  Type 2 diabetes mellitus with other specified complication, without long-term current use of insulin (Multi)  Comments:  Check hemoglobin A1c in 2 weeks  Increase Ozempic to 1 mg for weight loss and A1c reduction  Continue metformin as needed, continue with glimepiride  Orders:  -     Hemoglobin A1C; Future  -     semaglutide (OZEMPIC) 1 mg/dose (4 mg/3 mL) pen injector; Inject 1 mg under the skin 1 (one) time per week.  Dyslipidemia  Comments:  Continue simvastatin  Chronic pain of left knee  Comments:  Scheduled for left knee replacement in June

## 2024-04-16 LAB
HBV CORE IGM SER QL: NONREACTIVE
HBV SURFACE AB SER-ACNC: <3.1 MIU/ML
HBV SURFACE AG SERPL QL IA: NONREACTIVE
HCV AB SER QL: NONREACTIVE

## 2024-04-18 LAB
NIL(NEG) CONTROL SPOT COUNT: NORMAL
PANEL A SPOT COUNT: 0
PANEL B SPOT COUNT: 0
POS CONTROL SPOT COUNT: NORMAL
T-SPOT. TB INTERPRETATION: NEGATIVE

## 2024-04-29 DIAGNOSIS — L63.9 ALOPECIA AREATA: ICD-10-CM

## 2024-05-02 ENCOUNTER — LAB (OUTPATIENT)
Dept: LAB | Facility: LAB | Age: 59
End: 2024-05-02
Payer: COMMERCIAL

## 2024-05-02 DIAGNOSIS — I10 ESSENTIAL (PRIMARY) HYPERTENSION: ICD-10-CM

## 2024-05-02 DIAGNOSIS — E11.69 TYPE 2 DIABETES MELLITUS WITH OTHER SPECIFIED COMPLICATION, WITHOUT LONG-TERM CURRENT USE OF INSULIN (MULTI): ICD-10-CM

## 2024-05-02 DIAGNOSIS — I10 PRIMARY HYPERTENSION: ICD-10-CM

## 2024-05-02 DIAGNOSIS — L65.9 ALOPECIA: ICD-10-CM

## 2024-05-02 LAB
ALBUMIN SERPL BCP-MCNC: 4.1 G/DL (ref 3.4–5)
ALP SERPL-CCNC: 60 U/L (ref 33–110)
ALT SERPL W P-5'-P-CCNC: 18 U/L (ref 7–45)
ANION GAP SERPL CALC-SCNC: 11 MMOL/L (ref 10–20)
AST SERPL W P-5'-P-CCNC: 18 U/L (ref 9–39)
BILIRUB SERPL-MCNC: 0.4 MG/DL (ref 0–1.2)
BUN SERPL-MCNC: 15 MG/DL (ref 6–23)
CALCIUM SERPL-MCNC: 9.5 MG/DL (ref 8.6–10.3)
CHLORIDE SERPL-SCNC: 107 MMOL/L (ref 98–107)
CO2 SERPL-SCNC: 27 MMOL/L (ref 21–32)
CREAT SERPL-MCNC: 0.74 MG/DL (ref 0.5–1.05)
EGFRCR SERPLBLD CKD-EPI 2021: >90 ML/MIN/1.73M*2
ERYTHROCYTE [DISTWIDTH] IN BLOOD BY AUTOMATED COUNT: 14.4 % (ref 11.5–14.5)
GLUCOSE SERPL-MCNC: 77 MG/DL (ref 74–99)
HCT VFR BLD AUTO: 35.6 % (ref 36–46)
HGB BLD-MCNC: 11.4 G/DL (ref 12–16)
MCH RBC QN AUTO: 27.3 PG (ref 26–34)
MCHC RBC AUTO-ENTMCNC: 32 G/DL (ref 32–36)
MCV RBC AUTO: 85 FL (ref 80–100)
NRBC BLD-RTO: 0 /100 WBCS (ref 0–0)
PLATELET # BLD AUTO: 273 X10*3/UL (ref 150–450)
POTASSIUM SERPL-SCNC: 4.4 MMOL/L (ref 3.5–5.3)
PROT SERPL-MCNC: 6.7 G/DL (ref 6.4–8.2)
RBC # BLD AUTO: 4.18 X10*6/UL (ref 4–5.2)
SODIUM SERPL-SCNC: 141 MMOL/L (ref 136–145)
TSH SERPL-ACNC: 1.81 MIU/L (ref 0.44–3.98)
WBC # BLD AUTO: 8 X10*3/UL (ref 4.4–11.3)

## 2024-05-02 PROCEDURE — 80053 COMPREHEN METABOLIC PANEL: CPT

## 2024-05-02 PROCEDURE — 36415 COLL VENOUS BLD VENIPUNCTURE: CPT

## 2024-05-02 PROCEDURE — 83036 HEMOGLOBIN GLYCOSYLATED A1C: CPT

## 2024-05-02 PROCEDURE — 84443 ASSAY THYROID STIM HORMONE: CPT

## 2024-05-02 PROCEDURE — 85027 COMPLETE CBC AUTOMATED: CPT

## 2024-05-03 LAB
EST. AVERAGE GLUCOSE BLD GHB EST-MCNC: 94 MG/DL
HBA1C MFR BLD: 4.9 %

## 2024-05-06 ENCOUNTER — OFFICE VISIT (OUTPATIENT)
Dept: DERMATOLOGY | Facility: CLINIC | Age: 59
End: 2024-05-06
Payer: COMMERCIAL

## 2024-05-06 DIAGNOSIS — L63.9 ALOPECIA AREATA: ICD-10-CM

## 2024-05-07 ENCOUNTER — HOSPITAL ENCOUNTER (OUTPATIENT)
Dept: RADIOLOGY | Facility: CLINIC | Age: 59
Discharge: HOME | End: 2024-05-07
Payer: COMMERCIAL

## 2024-05-07 ENCOUNTER — OFFICE VISIT (OUTPATIENT)
Dept: ORTHOPEDIC SURGERY | Facility: CLINIC | Age: 59
End: 2024-05-07
Payer: COMMERCIAL

## 2024-05-07 VITALS — BODY MASS INDEX: 39.66 KG/M2 | WEIGHT: 202 LBS | HEIGHT: 60 IN

## 2024-05-07 DIAGNOSIS — M17.12 PRIMARY OSTEOARTHRITIS OF LEFT KNEE: ICD-10-CM

## 2024-05-07 DIAGNOSIS — M17.10 PRIMARY OSTEOARTHRITIS OF KNEE, UNSPECIFIED LATERALITY: ICD-10-CM

## 2024-05-07 PROCEDURE — 99214 OFFICE O/P EST MOD 30 MIN: CPT | Performed by: STUDENT IN AN ORGANIZED HEALTH CARE EDUCATION/TRAINING PROGRAM

## 2024-05-07 PROCEDURE — 4010F ACE/ARB THERAPY RXD/TAKEN: CPT | Performed by: STUDENT IN AN ORGANIZED HEALTH CARE EDUCATION/TRAINING PROGRAM

## 2024-05-07 PROCEDURE — 77073 BONE LENGTH STUDIES: CPT

## 2024-05-07 PROCEDURE — 73564 X-RAY EXAM KNEE 4 OR MORE: CPT | Mod: LT

## 2024-05-07 PROCEDURE — 3008F BODY MASS INDEX DOCD: CPT | Performed by: STUDENT IN AN ORGANIZED HEALTH CARE EDUCATION/TRAINING PROGRAM

## 2024-05-07 PROCEDURE — 3044F HG A1C LEVEL LT 7.0%: CPT | Performed by: STUDENT IN AN ORGANIZED HEALTH CARE EDUCATION/TRAINING PROGRAM

## 2024-05-07 PROCEDURE — 1036F TOBACCO NON-USER: CPT | Performed by: STUDENT IN AN ORGANIZED HEALTH CARE EDUCATION/TRAINING PROGRAM

## 2024-05-07 ASSESSMENT — PAIN SCALES - GENERAL: PAINLEVEL_OUTOF10: 8

## 2024-05-07 ASSESSMENT — PAIN DESCRIPTION - DESCRIPTORS: DESCRIPTORS: THROBBING;ACHING

## 2024-05-07 ASSESSMENT — PAIN - FUNCTIONAL ASSESSMENT: PAIN_FUNCTIONAL_ASSESSMENT: 0-10

## 2024-05-07 NOTE — PROGRESS NOTES
Chief complaint: Preoperative visit prior to upcoming left total knee arthroplasty    HPI: KINSEY MAYFIELD is a pleasant 59 y.o. female who is seen today for preoperative consultation prior to upcoming left total knee arthroplasty.  The patient continues to complain of disabling pain in the left knee.  She feels that her quality life is negatively impacted.  She would like to move forward with total knee arthroplasty.  She is failed conservative treat measures.  These included but were not limited to weight loss, intra-articular injections of corticosteroid, activity modification and exercise.  She continues to feel unstable.  She has not had any fall or trauma.    There has been no interval change in this patient's past medical, surgical, medications, allergies, family history or social history since the most recent visit to a provider within our department.  14 point review of systems was performed, reviewed, and negative except for pertinent positives documented in the history of present illness.     Objective:  BMI: 39.45  General: Well-appearing female in no acute distress. Awake, alert and oriented. Pleasant and cooperative.  Respiratory: Non-labored breathing  Mood: Euthymic   Gait: Antalgic  Assistive Device: None      Affected Right Knee  Limb Alignment: Mild varus  ROM: 5-95  Stable to varus and valgus stress at full extension and 30 degrees of flexion. Fixed deformity. Not correctable  Skin: Intact, no abrasions or draining sinuses  Effusion: None  Quad Strength: 5/5  Hamstring Strength: 5/5  Patella Crepitus: None  Patella Grind: Positive  Tenderness: Medial and lateral joint line  Sensation: Intact to light touch distally  Motor function: Able to fire TA, EHL, G/S  Pulses: Not palpable     Affected Left Knee  Limb Alignment: Mild varus  ROM: 5-110  Stable to varus and valgus stress at full extension and 30 degrees of flexion  Skin: Intact, no abrasions or draining sinuses  Effusion: None  Quad Strength:  5/5  Hamstring Strength: 5/5  Patella Crepitus: N positive present one  Patella Grind: Negative  Tenderness: Medial and lateral joint line  Sensation: Intact to light touch distally  Motor function: Able to fire TA, EHL, G/S  Pulses: Not palpable     Imaging:   AP/ lateral/ mid-flexion/sunrise views: Independent review of left knee x-rays was performed. The findings were reviewed with the patient. There are severe degenerative changes of the left knee with varus limb alignment. There is joint space narrowing, subchondral sclerosis, and osteophyte formation. No evidence of fracture, AVN, dislocation, osteomyelitis.     Assessment and plan  Kim Yi for more than six months has had limited function as well as persistent and severe pain which has negatively impacted the quality of life and interfered with activities of daily living. Under my care or the care of other providers, for greater than the three months, conservative treatment including activity modification, over the counter pain medications, injections, physical therapy and/or recommended home exercise program, have provided only minimal relief. The patient has not had an intra-articular injection in the past 3 months. The option to continue with conservative measures in lieu of arthroplasty was discussed and offered. However, given the failure of these conservative measures and the clinical and radiographic evidence of end-stage arthritis, the patient is a good candidate for an elective total knee arthroplasty. The stated potential benefits include pain relief and improved function were discussed but no guarantees were offered. Some patients may experience improved range of motion but pre-operative range of motion remains the strongest predictor of post-operative range of motion.     I talked with the patient at length about risks, limitations, benefits and alternatives to total knee replacement today. I reviewed risks and concerns including but not  limited to implant wear, loosening, implant failure, infection, need for revision surgery, delayed wound healing, deep vein thrombosis, pulmonary embolism, stroke, other cardiopulmonary event, nerve or vascular injury, death and other medical and anesthetic complications of surgery. We talked about the potential for persistent pain following surgery since there are many possible causes for knee pain. We talked about limited range of motion following knee replacement and the importance of physical therapy and their motivation. In rare cases, temporary but sometimes permanent nerve dysfunction can occur. The patient was advised that knee replacement will only relieve pain that is coming from the knee. I reviewed dislocation precautions and activity restrictions in detail. We discussed the concerns about intraoperative fracture and cemented versus cement-less implants.  We discussed the possible need for a blood transfusion. We discussed the fact that many of our patients are able to go home in 1 day or the same day depending on their health, mobility, pre-op preparation, individual home situation and personal preference. The patient should take our pre-operative teaching class. All of the patients questions were answered. The patient can call my office to schedule surgery and the pre-op teaching class. I told the patient that they should contact their primary care physician to discuss fitness for surgery. The patient was also encouraged to get dental clearance prior to surgery.     The patient acknowledged a clear understanding of these issues and expressed the desire to proceed with surgery once medical clearance has been obtained.    The patient has identified their personal goals of their joint replacement surgery and recovery and we have discussed them. These are documented in our [employer] patient engagement platform. In addition, we have discussed the advantages and disadvantages of various implant and fixation  options, as well as various surgical approaches. The basic concepts of the joint replacement procedure has been reviewed with the patient and the patient has been provided the opportunity to see an actual implant either in the office or in our pre-op education class.    I also discussed with the patient the risks of being in the hospital environment in the setting of COVID-19. This risk was considered in light of the overall risk and benefit discussion related to the surgery. The patient's symptoms are severe and worsening, and cause an inability to perform activities of daily living. All possible precautions will be taken and length of stay will be limited as much as possible. The patient is fully aware of this after complete discussion and would like to proceed.    The patient has the following comorbidities that increase the risk of infection following joint replacement surgery: obesity, diabetes. This was explicitly discussed with the patient and they would like to proceed with surgery.     Surgical plan: Left total knee arthroplasty   Implants: Depuy CoContest   Special equipment: None  DVT prophylaxis:  Aspirin 81 mg BID for 4 weeks   Drugs to stop: none  Allergies to antibiotics: none  Antibiotic Plan: Ancef (+/- vancomycin pending MRSA screen)  Special clearance needed: Per PAT  Candidate for Outpatient: No  Meds-to-beds: Not d/w patient   Pain medication post op:  Oxycodone and Tylenol. NO Tramadol. Add Mobic  DME Recommendations: [employer], Thigh high compression stocking, Walker or Crutches depending on patient preference     *This office note was dictated using Dragon voice to text software and was not proofread for spelling or grammatical errors *      * This office note was dictated using Dragon voice to text software and was not proofread for spelling or grammatical errors *

## 2024-05-28 NOTE — PROGRESS NOTES
Thank you for attending our Joint Replacement class today in preparation for your upcoming surgery.  Topics discussed include:    MyChart Enrollment  Communication with Care Team  My Chart is the best form of communication to reach all of your caregivers  You can send messages to specific care givers, or a care team  Continued Education  You will be enrolled in a Total Joint Replacement care plan to receive additional education before and after surgery  You can review a short recording of the class content  Access to Medical Records  You can access test results, office notes, appointments, etc.  You can connect to other healthcare systems who use [employer] ([facility], [facility], [facility], [place], etc.)  [employer]  Program Information  Consent to Enroll    Background/Understanding of Joint Replacement Surgery  Potential for same day discharge  Any questions or concerns to be directed to the surgeon's office    How to Prepare for Surgery  Use of Nicotine Products/Smoking  Stop several weeks before surgery  Such products slow down the healing process and increase risk of post-op infection and complications  Clearance for Surgery  Medical Clearance by Specialists  Dental Clearance  Cracked/Broken/Loose teeth left untreated may postpone surgery  The importance of post-op antibiotics for dental visits per surgeon protocol  Preadmission Testing  **Potential for postponed surgery if appropriate clearance is not obtained  Medication Instruction  Follow instructions provided by the doctor who prescribes your medication (typically, but not limited to cardiologist)  Preadmission testing will provide additional instructions during your appointment on what to stop and what to take as you get closer to surgery  For clarification of these instructions, please call preadmission testing directly - [phone]  Tips for Preparing the home for discharge from the hospital  Care Partner  Requirement for surgery, the patient must have a plan to have  help at home  Potential for postponed surgery if plan for home support cannot be established  How the care partner can help after surgery  CHG Body Wash/Mouth Wash  Follow the instructions given at preadmission testing  Body wash is to be used on the body and hair for 5 washes  Mouthwash is to be used the night before and morning of surgery  **This is a system-wide protocol developed by infectious disease professionals, we will not alter our recommendations for those with sensitive skin or those who have special hair needs.  Please follow the instructions as they are written as this will provide the best infection prevention measures for surgery.  Should you have an allergy to one of the products, please discuss with your preadmission team**    What to Expect in the Hospital/At Home  Morning of Surgery NPO Guidelines  Nothing to eat after midnight  Water can be consumed up to 2 hours prior to arrival  Surgical and Post-Surgical Care Team  Surgical Team  Anesthesia Team  Nursing  Physical Therapy  Care Coordinating  Pharmacy  Hospital Arrival Instructions  Arrive at the time provided to you  Consider traffic patterns (rush-hour) based on arrival time  Have arrangements made for a ride home  If discharging same day, care partner should remain at the hospital  Recovering after Surgery  Recovery Room - Visitors are not brought back  Transition to hospital room - 2nd Floor, Visitors will be directed to your room  The presence of and strategies for controlling surgical pain and swelling  The importance of early mobility  Side effects after surgery  What to expect if staying overnight    Discharge Planning  The intended plan for discharge will be for patients to discharge home  All patients require a care partner (family, friend, neighbor, etc.) to stay with the patient for the first few nights after surgery  The inability to secure help at home will postpone surgery  Home Care Services set up per surgeon order  Physical  Therapy  Occupational Therapy  **If desired, private duty care can be arranged by the patient ahead of time**  Outpatient Physical Therapy per surgeon order    Recovering at Home  Wound Care  Follow wound care instructions found in your discharge paperwork  Bandage is water-resistant and you may shower with the bandage  Do not scrub directly over the bandage  Do not submerge in water until cleared (bathtub, hot tub, pool, etc.)    Post-Op Risk Prevention  Infection Prevention  Promptly seek treatment for any infections post-operatively  Routine dental visits must be postponed for 3 months after surgery  Your surgeon may require antibiotics prior to future dental visits  Any concerns for infection not related directly to the knee or the hip should be managed by your primary care provider  Blood Clots  Be sure to complete the course of blood thinning medication as prescribed by your surgeon  Movement every 1-2 hours during the day is encouraged to prevent blood clots  Monitor for signs of blood clots  Wear compression stockings as prescribed by your surgeon  Constipation  Constipation is common following surgery  Drink plenty of fluids  Take stool softener/laxative as prescribed by your surgeon  Move around frequently  Eat foods high in fiber  Fall Prevention  Prepare home ahead of time to clear space to move with walker  Remove throw rugs and electrical cords from walkways  Install railings near any stairways with more than 2 steps  Use night lights for increased visibility at night  Continue to use your assistive device until cleared by surgeon or physical therapy  Dislocation Prevention - Not all procedures will have dislocation precautions  Follow dislocation precautions provided by your surgeon  It is OK to resume sexual activity about 6 weeks following surgery  Be sure to follow any dislocation precautions assigned    Durable Medical Equipment  Cold Therapy  Breg Cold Therapy Machines  Ice/Gel Packs  Assistive  Devices  Folding Walker with Wheels (in the front only)  No Rollators  Crutches if approved by Physical Therapy and Surgeon after surgery  Hip Kits  Raised Toilet Seats  Additional Compression Stockings    Joint Preservation  Healthy Activities when Cleared  Walking  Swimming  Bike Riding  Activities to Avoid  Refrain from repetitive motions which have a high impact on the joint  Gradual Progression  Progress activity slowly, listen to your body  Common Findings - NORMAL after surgery  Clicking/Grinding  Numbness near incision    Physical Therapy  Prehabilitation exercises  START TODAY ON BOTH LEGS  Surgery Specific Precautions  Follow surgery specific precautions found in your discharge paperwork    Follow-Up Visit  All patients will see their surgeon for a follow up visit after surgery  The visit may range from 2-6 weeks after surgery and is surgeon specific      Please don't hesitate to reach out if you have any additional questions or concerns.    Larissa Centeno MBA, BSN, RN-BC  ALINA MurphyN, RN  [facility]  [facility]   [phone]

## 2024-05-29 ENCOUNTER — EDUCATION (OUTPATIENT)
Dept: ORTHOPEDIC SURGERY | Facility: HOSPITAL | Age: 59
End: 2024-05-29
Payer: COMMERCIAL

## 2024-06-11 ENCOUNTER — LAB (OUTPATIENT)
Dept: LAB | Facility: LAB | Age: 59
End: 2024-06-11
Payer: COMMERCIAL

## 2024-06-11 ENCOUNTER — PRE-ADMISSION TESTING (OUTPATIENT)
Dept: PREADMISSION TESTING | Facility: HOSPITAL | Age: 59
End: 2024-06-11
Payer: COMMERCIAL

## 2024-06-11 VITALS
HEIGHT: 60 IN | TEMPERATURE: 97.6 F | SYSTOLIC BLOOD PRESSURE: 167 MMHG | BODY MASS INDEX: 40.3 KG/M2 | WEIGHT: 205.25 LBS | OXYGEN SATURATION: 100 % | HEART RATE: 67 BPM | RESPIRATION RATE: 14 BRPM | DIASTOLIC BLOOD PRESSURE: 87 MMHG

## 2024-06-11 DIAGNOSIS — Z01.818 PREOP TESTING: Primary | ICD-10-CM

## 2024-06-11 DIAGNOSIS — I10 PRIMARY HYPERTENSION: ICD-10-CM

## 2024-06-11 DIAGNOSIS — Z01.818 PREOP TESTING: ICD-10-CM

## 2024-06-11 DIAGNOSIS — E78.5 DYSLIPIDEMIA: Primary | ICD-10-CM

## 2024-06-11 DIAGNOSIS — E11.69 TYPE 2 DIABETES MELLITUS WITH OTHER SPECIFIED COMPLICATION, WITHOUT LONG-TERM CURRENT USE OF INSULIN (MULTI): ICD-10-CM

## 2024-06-11 DIAGNOSIS — M17.12 PRIMARY OSTEOARTHRITIS OF LEFT KNEE: ICD-10-CM

## 2024-06-11 LAB
ATRIAL RATE: 59 BPM
ERYTHROCYTE [DISTWIDTH] IN BLOOD BY AUTOMATED COUNT: 13.9 % (ref 11.5–14.5)
HCT VFR BLD AUTO: 40.3 % (ref 36–46)
HGB BLD-MCNC: 12.8 G/DL (ref 12–16)
MCH RBC QN AUTO: 26.6 PG (ref 26–34)
MCHC RBC AUTO-ENTMCNC: 31.8 G/DL (ref 32–36)
MCV RBC AUTO: 84 FL (ref 80–100)
NRBC BLD-RTO: ABNORMAL /100{WBCS}
P AXIS: 40 DEGREES
P OFFSET: 171 MS
P ONSET: 133 MS
PLATELET # BLD AUTO: 233 X10*3/UL (ref 150–450)
PR INTERVAL: 174 MS
Q ONSET: 220 MS
QRS COUNT: 9 BEATS
QRS DURATION: 82 MS
QT INTERVAL: 408 MS
QTC CALCULATION(BAZETT): 403 MS
QTC FREDERICIA: 405 MS
R AXIS: 19 DEGREES
RBC # BLD AUTO: 4.81 X10*6/UL (ref 4–5.2)
T AXIS: 15 DEGREES
T OFFSET: 424 MS
VENTRICULAR RATE: 59 BPM
WBC # BLD AUTO: 8 X10*3/UL (ref 4.4–11.3)

## 2024-06-11 PROCEDURE — 36415 COLL VENOUS BLD VENIPUNCTURE: CPT

## 2024-06-11 PROCEDURE — 85027 COMPLETE CBC AUTOMATED: CPT

## 2024-06-11 PROCEDURE — 93005 ELECTROCARDIOGRAM TRACING: CPT

## 2024-06-11 PROCEDURE — 87081 CULTURE SCREEN ONLY: CPT | Mod: BEALAB

## 2024-06-11 PROCEDURE — 93010 ELECTROCARDIOGRAM REPORT: CPT | Performed by: INTERNAL MEDICINE

## 2024-06-11 RX ORDER — FENOFIBRATE 160 MG/1
160 TABLET ORAL DAILY
Qty: 90 TABLET | Refills: 1 | Status: SHIPPED | OUTPATIENT
Start: 2024-06-11

## 2024-06-11 RX ORDER — METFORMIN HYDROCHLORIDE 500 MG/1
500 TABLET, EXTENDED RELEASE ORAL 2 TIMES DAILY
Qty: 180 TABLET | Refills: 1 | Status: SHIPPED | OUTPATIENT
Start: 2024-06-11

## 2024-06-11 RX ORDER — LOSARTAN POTASSIUM 100 MG/1
100 TABLET ORAL DAILY
Qty: 90 TABLET | Refills: 1 | Status: SHIPPED | OUTPATIENT
Start: 2024-06-11

## 2024-06-11 RX ORDER — CHLORHEXIDINE GLUCONATE ORAL RINSE 1.2 MG/ML
SOLUTION DENTAL
Qty: 473 ML | Refills: 0 | Status: SHIPPED | OUTPATIENT
Start: 2024-06-11

## 2024-06-11 ASSESSMENT — ENCOUNTER SYMPTOMS
NECK NEGATIVE: 1
ENDOCRINE NEGATIVE: 1
CARDIOVASCULAR NEGATIVE: 1
NEUROLOGICAL NEGATIVE: 1
CONSTITUTIONAL NEGATIVE: 1
EYES NEGATIVE: 1
RESPIRATORY NEGATIVE: 1
GASTROINTESTINAL NEGATIVE: 1

## 2024-06-11 ASSESSMENT — DUKE ACTIVITY SCORE INDEX (DASI)
TOTAL_SCORE: 34.7
CAN YOU DO YARD WORK LIKE RAKING LEAVES, WEEDING OR PUSHING A MOWER: YES
DASI METS SCORE: 7
CAN YOU WALK INDOORS, SUCH AS AROUND YOUR HOUSE: YES
CAN YOU DO HEAVY WORK AROUND THE HOUSE LIKE SCRUBBING FLOORS OR LIFTING AND MOVING HEAVY FURNITURE: NO
CAN YOU RUN A SHORT DISTANCE: NO
CAN YOU DO LIGHT WORK AROUND THE HOUSE LIKE DUSTING OR WASHING DISHES: YES
CAN YOU CLIMB A FLIGHT OF STAIRS OR WALK UP A HILL: YES
CAN YOU WALK A BLOCK OR TWO ON LEVEL GROUND: YES
CAN YOU HAVE SEXUAL RELATIONS: YES
CAN YOU PARTICIPATE IN MODERATE RECREATIONAL ACTIVITIES LIKE GOLF, BOWLING, DANCING, DOUBLES TENNIS OR THROWING A BASEBALL OR FOOTBALL: YES
CAN YOU TAKE CARE OF YOURSELF (EAT, DRESS, BATHE, OR USE TOILET): YES
CAN YOU DO MODERATE WORK AROUND THE HOUSE LIKE VACUUMING, SWEEPING FLOORS OR CARRYING GROCERIES: YES
CAN YOU PARTICIPATE IN STRENOUS SPORTS LIKE SWIMMING, SINGLES TENNIS, FOOTBALL, BASKETBALL, OR SKIING: NO

## 2024-06-11 ASSESSMENT — PAIN - FUNCTIONAL ASSESSMENT: PAIN_FUNCTIONAL_ASSESSMENT: 0-10

## 2024-06-11 ASSESSMENT — LIFESTYLE VARIABLES: SMOKING_STATUS: NONSMOKER

## 2024-06-11 ASSESSMENT — PAIN SCALES - GENERAL: PAINLEVEL_OUTOF10: 6

## 2024-06-11 NOTE — H&P (VIEW-ONLY)
CPM/PAT Evaluation       Name: Kim Yi (Kim Yi)  /Age: 1965/59 y.o.     Visit Type:   In-Person       Chief Complaint: left knee pain    HPI: Patient is a 58 yo F scheduled for left total knee arthroplasty with Dr. Parker secondary to osteoarthitis. Pt notes left knee pain 6/10. Patient was referred by Dr. Parker to CPM today for perioperative risk stratification and optimization. Patient's PMHx is notable for DM, Asthma, HTN.    Past Medical History:   Diagnosis Date    Arthritis     Asthma (HHS-HCC)     Hyperlipidemia     Hypertension     Other conditions influencing health status 2013    Familial Combined Hyperlipidemia    Pancreatitis (HHS-HCC)     Personal history of other diseases of the circulatory system     History of hypertension    Personal history of other diseases of the respiratory system     History of asthma    Type 2 diabetes mellitus without complications (Multi) 2022    Diabetes mellitus       Past Surgical History:   Procedure Laterality Date    APPENDECTOMY  10/07/2015    Appendectomy     SECTION, CLASSIC  10/07/2015     Section    CHOLECYSTECTOMY  2016    Cholecystectomy Laparoscopic    OTHER SURGICAL HISTORY  2016    Abdominal Paracentesis - Subsequent       Patient  has no history on file for sexual activity.    Family History   Problem Relation Name Age of Onset    Diabetes Mother      Coronary artery disease Mother          Premature CAD; Mother had stents age late 40's    Diabetes Father      Diabetes Brother      Coronary artery disease Brother          premature CAD    Hypertension Other         Allergies   Allergen Reactions    Other Hives, Other and Unknown     Darvocet-N 100 TABS    Other=Vomiting    Oxycodone-Acetaminophen Headache     Percocet TABS    Tramadol Headache       Prior to Admission medications    Medication Sig Start Date End Date Taking? Authorizing Provider   blood sugar diagnostic (ONETOUCH  "VERIO TEST STRIPS MISC) OneTouch Verio In Vitro Strip  Test twice daily   Yes Historical Provider, MD   fenofibrate (Triglide) 160 mg tablet Take 1 tablet (160 mg) by mouth once daily. 3/11/15  Yes Historical Provider, MD   glimepiride (Amaryl) 4 mg tablet TAKE 1 TABLET BY MOUTH EVERY DAY WITH BREAKFAST 5/4/23  Yes Amy Moreland MD   lancets (OneTouch Delica Lancets) 33 gauge misc Test twice daily   Yes Historical Provider, MD   lancets misc BD Lancet Ultrafine 33G   Yes Historical Provider, MD   losartan (Cozaar) 100 mg tablet Take 1 tablet (100 mg) by mouth once daily. 4/6/23  Yes Amy Moreland MD   metFORMIN  mg 24 hr tablet TAKE 1 TABLET (500 MG) BY MOUTH IN THE MORNING AND 1 TABLET (500 MG) BEFORE BEDTIME. TWICE A DAY. 4/1/24  Yes Amy Moreland MD   pen needle 1/2\" 29G X 12mm needle Use to inject 1-4 times daily as directed. 12/4/23 11/30/24 Yes Amy Moreladn MD   semaglutide (OZEMPIC) 1 mg/dose (4 mg/3 mL) pen injector Inject 1 mg under the skin 1 (one) time per week. 4/21/24  Yes Amy Moreland MD   simvastatin (Zocor) 20 mg tablet Take 1 tablet (20 mg) by mouth once daily at bedtime. Daily (Please schedule an appointment) 12/4/23  Yes Amy Moreland MD   albuterol 0.63 mg/3 mL nebulizer solution Use 1 vial in nebulizer 3 times daily as needed 9/7/21   Historical Provider, MD   ALBUTEROL SULFATE INHL Inhale 2 puffs. Every 4-6 hours as directed  Albuterol Sulfate  (90 Base) MCG/ACT Inhalation Aerosol Solution (ProAir HFA)    Historical Provider, MD   cyclobenzaprine (Flexeril) 10 mg tablet Take 1 tablet (10 mg) by mouth 3 times a day as needed for muscle spasms.  Patient not taking: Reported on 6/11/2024 12/5/23   Mika Navas PA-C   fluocinonide (Lidex) 0.05 % external solution Apply topically to scalp BID.  Patient not taking: Reported on 6/11/2024 2/12/24   Emily Augustin PA-C   fluticasone furoate-vilanteroL (Breo Ellipta) 100-25 mcg/dose inhaler Inhale 1 puff once " daily.  Patient not taking: Reported on 6/11/2024 4/6/23   Amy Moreland MD   pantoprazole sodium (PANTOPRAZOLE ORAL) Take 1 tablet by mouth. Twice daily  Pantoprazole Sodium 40 MG Oral tablet delayed release    Historical Provider, MD   triamterene-hydrochlorothiazid (Maxzide-25) 37.5-25 mg tablet Take 1 tablet by mouth once daily.  Patient not taking: Reported on 6/11/2024 10/23/23   Amy Moreland MD   baricitinib (Olumiant) 2 mg tablet Take 1 tablet (2 mg) by mouth once daily. 5/2/24 6/11/24  Emily Augustin PA-C   beclomethasone HFA (Qvar RediHaler) 40 mcg/actuation inhaler 1 puff bid 9/2/21 6/11/24  Historical Provider, MD   ipratropium (Atrovent HFA) 17 mcg/actuation inhaler Inhale 2 puffs. 4 times daily as needed for mild intermittent asthma with acute exacerbation  6/11/24  Historical Provider, MD   naproxen (Naprosyn) 500 mg tablet Take 1 tablet (500 mg) by mouth every 12 (twelve) hours. With food 7/17/22 6/11/24  Historical Provider, MD FALL ROS:   Constitutional:   neg    Neuro/Psych:   neg    Eyes:   neg    Ears:   neg    Nose:   Mouth:   neg    Throat:   neg    Neck:   neg    Cardio:   neg    Respiratory:   neg    Endocrine:   neg    GI:   neg    :   neg    Musculoskeletal:    +LT knee pain  Hematologic:   neg    Skin:  neg        Physical Exam  Vitals and nursing note reviewed.   Constitutional:       General: She is not in acute distress.     Appearance: She is not ill-appearing or toxic-appearing.   HENT:      Head: Normocephalic and atraumatic.      Nose: Nose normal.      Mouth/Throat:      Mouth: Mucous membranes are moist.   Eyes:      Conjunctiva/sclera: Conjunctivae normal.      Pupils: Pupils are equal, round, and reactive to light.   Neck:      Vascular: No carotid bruit.   Cardiovascular:      Rate and Rhythm: Normal rate and regular rhythm.      Pulses: Normal pulses.      Heart sounds: Normal heart sounds. No murmur heard.  Pulmonary:      Effort: Pulmonary effort is  normal.      Breath sounds: Normal breath sounds.   Abdominal:      General: There is no distension.      Palpations: Abdomen is soft.      Tenderness: There is no abdominal tenderness.   Musculoskeletal:         General: Normal range of motion.      Cervical back: Normal range of motion.   Skin:     General: Skin is warm and dry.      Capillary Refill: Capillary refill takes less than 2 seconds.   Neurological:      General: No focal deficit present.      Mental Status: She is alert.   Psychiatric:         Mood and Affect: Mood normal.         Behavior: Behavior normal.          PAT AIRWAY:   Airway:     Mallampati::  II    Neck ROM::  Full         Visit Vitals  /87   Pulse 67   Temp 36.4 °C (97.6 °F) (Temporal)   Resp 14       DASI Risk Score      Flowsheet Row Most Recent Value   DASI SCORE 34.7   METS Score (Will be calculated only when all the questions are answered) 7          Caprini DVT Assessment      Flowsheet Row Most Recent Value   DVT Score 9   Current Status Elective major lower extremity arthroplasty   Age 40-59 years   BMI 41-50 (Morbid obesity)          Modified Frailty Index      Flowsheet Row Most Recent Value   Modified Frailty Index Calculator .1818          CHADS2 Stroke Risk  Current as of 6 minutes ago        N/A 3 to 100%: High Risk   2 to < 3%: Medium Risk   0 to < 2%: Low Risk     Last Change: N/A          This score determines the patient's risk of having a stroke if the patient has atrial fibrillation.        This score is not applicable to this patient. Components are not calculated.          Revised Cardiac Risk Index      Flowsheet Row Most Recent Value   Revised Cardiac Risk Calculator 0          Apfel Simplified Score      Flowsheet Row Most Recent Value   Apfel Simplified Score Calculator 2          Risk Analysis Index Results This Encounter    No data found in the last 1 encounters.       Stop Bang Score      Flowsheet Row Most Recent Value   Do you snore loudly? 1   Do you  often feel tired or fatigued after your sleep? 0   Has anyone ever observed you stop breathing in your sleep? 0   Do you have or are you being treated for high blood pressure? 1   Recent BMI (Calculated) 40.3   Is BMI greater than 35 kg/m2? 1=Yes   Age older than 50 years old? 1=Yes   Is your neck circumference greater than 17 inches (Male) or 16 inches (Female)? 1   Gender - Male 0=No   STOP-BANG Total Score 5            Assessment and Plan:   Neuro:  No diagnosis or significant findings on chart review or clinical presentation and evaluation.   Preoperative brain exercise educational handout provided to patient.    HEENT/Airway:  No diagnosis or significant findings on chart review or clinical presentation and evaluation.    Cardiovascular:   -HTN - on triamterene-hydrochlorothiazide (continue), losartan (hod 24 hours)  -HLD - on simvastatin (continue), fenofibrate (hold 24 hours)    ASAIII  EKG  with sinus bradycardia with sinus arrhythmia, reviewed with alicia Spear to proceed, no additional testing needed at this time  METS are 7  RCRI  0 which is 3.9%  30 day risk of MACE (risk for cardiac death, nonfatal myocardial infarction, and nonfactal cardiac arrest  NOA score which indicates a  0.1% risk of intraoperative or 30-day postoperative MACE      Pulmonary:    -Asthma - uses albuterol inhaler, no recent exacerbations, no hospitalizations; no wheezes/rhonchi/rales  Preoperative deep breathing educational handout provided to patient.    ARISCAT:    19  points which is a low (1.6%) risk of in-hospital post-op pulmonary complications   PRODIGY:  5  points which is a low risk of post op opioid induced respiratory depression episodes  STOP BAN  points which is a high risk for moderate to severe LAUREN    Renal: The patient is at increased risk of perioperative renal complications secondary to age>/= 56, HTN, and diabetes. Preventative measures include preoperative hydration, blood pressure control  CMP  ordered today    Endocrine:   -DM - on metformin (hold day of surgery),  glimepiride (hold day of surgery), ozempic (hold 7 days); most recent A1C on 05/2/24 reviewed 4.9    Hematologic:  No diagnosis or significant findings on chart review or clinical presentation and evaluation.  Preoperative DVT educational handout provided to patient. CBC w/ hemoglobin of 11.4 from 05/02/2024    Caprini Score:  9  points which is a highest risk of perioperative VTE    Gastrointestinal:   No diagnosis or significant findings on chart review or clinical presentation and evaluation.    Apfel: 2 points 39%% risk for post operative N/V    Infectious disease:    Patient provided with CHG body wash. CHG use instructions reviewed and provided to patient. Chlorhexidine .12% Dental Rinse e-prescribed per  infection prevention protocol. Patient educated.   Patient advised to call [facility] if mouthwash not received.   Staph screen collected    Musculoskeletal:    -osteoarthritis - scheduled for surgery      Anesthesia:  No anesthesia complications  No dental issues  No tobacco use  No personal/family issues with Anesthesia  No nickel, shell fish, or iodine allergies    Discussed with patient medication instructions, NPO guidelines, and any questions or concerns. Patient does not need further workup prior to preceding with elective surgery based on based on risk assessment.         Face to Face patient contact time 30 minutes    Codi Galarza PA-C 6/11/2024 9:41 AM      Pending labs ordered:  cbc and MSSA/MRSA culture  Follow up needed: labs

## 2024-06-11 NOTE — PREPROCEDURE INSTRUCTIONS
Thank you for visiting [facility].  If you have any changes to your health condition, please call the surgeons office to alert them and give them details of your symptoms.    Codi Galarza PA-C  P: [phone]  Department of Anesthesiology and Perioperative Medicine  --        Preoperative Fasting Guidelines    Why must I stop eating and drinking near surgery time?  With sedation, food or liquid in your stomach can enter your lungs causing serious complications  Increases nausea and vomiting    When do I need to stop eating and drinking before my surgery?  Do not eat any food or drink any liquids after midnight the night before your surgery/procedure.  You may have sips of water to take medications.              Preoperative Brain Exercises    What are brain exercises?  A brain exercise is any activity that engages your thinking (cognitive) skills.    What types of activities are considered brain exercises?  Jigsaw puzzles, crossword puzzles, word jumble, memory games, word search, and many more.  Many can be found free online or on your phone via a mobile jeyson.    Why should I do brain exercises before my surgery?  More recent research has shown brain exercise before surgery can lower the risk of postoperative delirium (confusion) which can be especially important for older adults.  Patients who did brain exercises for 5 to 10 hours the days before surgery, cut their risk of postoperative delirium in half up to 1 week after surgery.                  [facility]    Preoperative Deep Breathing Exercises    Why it is important to do deep breathing exercises before my surgery?  Deep breathing exercises strengthen your breathing muscles.  This helps you to recover after your surgery and decreases the chance of breathing complications.      How are the deep breathing exercises done?  Sit straight with your back supported.  Breathe in deeply and slowly through your nose.  Your lower rib cage should expand and your abdomen may move forward.  Hold that breath for 3 to 5 seconds.  Breathe out through pursed lips, slowly and completely.  Rest and repeat 10 times every hour while awake.  Rest longer if you become dizzy or lightheaded.      Patient Information: Incentive Spirometer  What is an incentive spirometer?  An incentive spirometer is a device used before and after surgery to “exercise” your lungs.  It helps you to take deeper breaths to expand your lungs.  Below is an example of a basic incentive spirometer.  The device you receive may differ slightly but they all function the same.    Why do I need to use an incentive spirometer?  Using your incentive spirometer prepares your lungs for surgery and helps prevent lung problems after surgery.  How do I use my incentive spirometer?  When you're using your incentive spirometer, make sure to breathe through your mouth. If you breathe through your nose, the incentive spirometer won't work properly. You can hold your nose if you have trouble.  If you feel dizzy at any time, stop and rest. Try again at a later time.  Follow the steps below:  Set up your incentive spirometer, expand the flexible tubing and connect to the outlet.  Sit upright in a chair or bed. Hold the incentive spirometer at eye level.   Put the mouthpiece in your mouth and close your lips tightly around it. Slowly breathe out (exhale) completely.  Breathe in (inhale) slowly through your mouth as deeply as you can. As you take a breath, you will see the piston rise inside the large column. While the piston rises, the indicator should move upwards. It should stay in between the 2 arrows (see Figure).  Try to get the piston as high as you can, while keeping the indicator between the arrows.   If the indicator doesn't stay between the arrows, you're breathing either too fast or too slow.  When you get it as high as you can, hold your breath for 10 seconds, or as long as  possible. While you're holding your breath, the piston will slowly fall to the base of the spirometer.  Once the piston reaches the bottom of the spirometer, breathe out slowly through your mouth. Rest for a few seconds.  Repeat 10 times. Try to get the piston to the same level with each breath.  Repeat every hour while awake  You can carefully clean the outside of the mouthpiece with an alcohol wipe or soap and water.            Patient and Family Education             Ways You Can Help Prevent Blood Clots             This handout explains some simple things you can do to help prevent blood clots.      Blood clots are blockages that can form in the body's veins. When a blood clot forms in your deep veins, it may be called a deep vein thrombosis, or DVT for short. Blood clots can happen in any part of the body where blood flows, but they are most common in the arms and legs. If a piece of a blood clot breaks free and travels to the lungs, it is called a pulmonary embolus (PE). A PE can be a very serious problem.         Being in the hospital or having surgery can raise your chances of getting a blood clot because you may not be well enough to move around as much as you normally do.         Ways you can help prevent blood clots in the hospital         Wearing SCDs. SCDs stands for Sequential Compression Devices.   SCDs are special sleeves that wrap around your legs  They attach to a pump that fills them with air to gently squeeze your legs every few minutes.   This helps return the blood in your legs to your heart.   SCDs should only be taken off when walking or bathing.   SCDs may not be comfortable, but they can help save your life.               Wearing compression stockings - if your doctor orders them. These special snug fitting stockings gently squeeze your legs to help blood flow.       Walking. Walking helps move the blood in your legs.   If your doctor says it is ok, try walking the halls at least   5 times  a day. Ask us to help you get up, so you don't fall.      Taking any blood thinning medicines your doctor orders.             [facility]; 3/23       Ways you can help prevent blood clots at home       Wearing compression stockings - if your doctor orders them. ? Walking - to help move the blood in your legs.       Taking any blood thinning medicines your doctor orders.      Signs of a blood clot or PE      Tell your doctor or nurse know right away if you have of the problems listed below.    If you are at home, seek medical care right away. Call 911 for chest pain or problems breathing.               Signs of a blood clot (DVT) - such as pain,  swelling, redness or warmth in your arm or leg      Signs of a pulmonary embolism (PE) - such as chest     pain or feeling short of breath           The Week before Surgery        Seven days before Surgery  Check your CPM medication instructions  Do the exercises provided to you by CPM   Arrange for a responsible, adult licensed  to take you home after surgery and stay with you for 24 hours.  You will not be permitted to drive yourself home if you have received any anesthetic/sedation  Six days before surgery  Check your CPM medication instructions  Do the exercises provided to you by CPM   Start using Chlorhexidene (CHG) body wash if prescribed  Five days before surgery  Check your CPM medication instructions  Do the exercises provided to you by CPM   Continue to use CHG body wash if prescribed  Three days before surgery  Check your CPM medication instructions  Do the exercises provided to you by CPM   Continue to use CHG body wash if prescribed  Two days before surgery  Check your CPM medication instructions  Do the exercises provided to you by CPM   Continue to use CHG body wash if prescribed    The Day before Surgery       Check your CPM medication and all other CPM instructions including when to stop eating and drinking  You will be called with your  arrival time for surgery in the late afternoon.  If you do not receive a call please reach out to your surgeon's office.  Do not smoke or drink 24 hours before surgery  Prepare items to bring with you to the hospital  Shower with your chlorhexidine wash if prescribed  Brush your teeth and use your chlorhexidine dental rinse if prescribed    The Day of Surgery       Check your CPM medication instructions  Ensure you follow the instructions for when to stop eating and drinking  Shower, if prescribed use CHG.  Do not apply any lotions, creams, moisturizers, perfume or deodorant  Brush your teeth and use your CHG dental rinse if prescribed  Wear loose comfortable clothing  Avoid make-up  Remove  jewelry and piercings, consider professional piercing removal with a plastic spacer if needed  Bring photo ID and Insurance card  Bring an accurate medication list that includes medication dose, frequency and allergies  Bring a copy of your advanced directives (will, health care power of )  Bring any devices and controllers as well as medical devices you have been provided with for surgery (CPAP, slings, braces, etc.)  Dentures, eyeglasses, and contacts will be removed before surgery, please bring cases for contacts or glasses

## 2024-06-11 NOTE — CPM/PAT H&P
CPM/PAT Evaluation       Name: Kim Yi (Kim Yi)  /Age: 1965/59 y.o.     Visit Type:   In-Person       Chief Complaint: left knee pain    HPI: Patient is a 58 yo F scheduled for left total knee arthroplasty with Dr. Parker secondary to osteoarthitis. Pt notes left knee pain 6/10. Patient was referred by Dr. Parker to CPM today for perioperative risk stratification and optimization. Patient's PMHx is notable for DM, Asthma, HTN.    Past Medical History:   Diagnosis Date    Arthritis     Asthma (HHS-HCC)     Hyperlipidemia     Hypertension     Other conditions influencing health status 2013    Familial Combined Hyperlipidemia    Pancreatitis (HHS-HCC)     Personal history of other diseases of the circulatory system     History of hypertension    Personal history of other diseases of the respiratory system     History of asthma    Type 2 diabetes mellitus without complications (Multi) 2022    Diabetes mellitus       Past Surgical History:   Procedure Laterality Date    APPENDECTOMY  10/07/2015    Appendectomy     SECTION, CLASSIC  10/07/2015     Section    CHOLECYSTECTOMY  2016    Cholecystectomy Laparoscopic    OTHER SURGICAL HISTORY  2016    Abdominal Paracentesis - Subsequent       Patient  has no history on file for sexual activity.    Family History   Problem Relation Name Age of Onset    Diabetes Mother      Coronary artery disease Mother          Premature CAD; Mother had stents age late 40's    Diabetes Father      Diabetes Brother      Coronary artery disease Brother          premature CAD    Hypertension Other         Allergies   Allergen Reactions    Other Hives, Other and Unknown     Darvocet-N 100 TABS    Other=Vomiting    Oxycodone-Acetaminophen Headache     Percocet TABS    Tramadol Headache       Prior to Admission medications    Medication Sig Start Date End Date Taking? Authorizing Provider   blood sugar diagnostic (ONETOUCH  "VERIO TEST STRIPS MISC) OneTouch Verio In Vitro Strip  Test twice daily   Yes Historical Provider, MD   fenofibrate (Triglide) 160 mg tablet Take 1 tablet (160 mg) by mouth once daily. 3/11/15  Yes Historical Provider, MD   glimepiride (Amaryl) 4 mg tablet TAKE 1 TABLET BY MOUTH EVERY DAY WITH BREAKFAST 5/4/23  Yes Amy Moreland MD   lancets (OneTouch Delica Lancets) 33 gauge misc Test twice daily   Yes Historical Provider, MD   lancets misc BD Lancet Ultrafine 33G   Yes Historical Provider, MD   losartan (Cozaar) 100 mg tablet Take 1 tablet (100 mg) by mouth once daily. 4/6/23  Yes Amy Moreland MD   metFORMIN  mg 24 hr tablet TAKE 1 TABLET (500 MG) BY MOUTH IN THE MORNING AND 1 TABLET (500 MG) BEFORE BEDTIME. TWICE A DAY. 4/1/24  Yes Amy Moreland MD   pen needle 1/2\" 29G X 12mm needle Use to inject 1-4 times daily as directed. 12/4/23 11/30/24 Yes Amy Moreland MD   semaglutide (OZEMPIC) 1 mg/dose (4 mg/3 mL) pen injector Inject 1 mg under the skin 1 (one) time per week. 4/21/24  Yes Amy Moreland MD   simvastatin (Zocor) 20 mg tablet Take 1 tablet (20 mg) by mouth once daily at bedtime. Daily (Please schedule an appointment) 12/4/23  Yes Amy Moreland MD   albuterol 0.63 mg/3 mL nebulizer solution Use 1 vial in nebulizer 3 times daily as needed 9/7/21   Historical Provider, MD   ALBUTEROL SULFATE INHL Inhale 2 puffs. Every 4-6 hours as directed  Albuterol Sulfate  (90 Base) MCG/ACT Inhalation Aerosol Solution (ProAir HFA)    Historical Provider, MD   cyclobenzaprine (Flexeril) 10 mg tablet Take 1 tablet (10 mg) by mouth 3 times a day as needed for muscle spasms.  Patient not taking: Reported on 6/11/2024 12/5/23   Mika Navas PA-C   fluocinonide (Lidex) 0.05 % external solution Apply topically to scalp BID.  Patient not taking: Reported on 6/11/2024 2/12/24   Emily Augustin PA-C   fluticasone furoate-vilanteroL (Breo Ellipta) 100-25 mcg/dose inhaler Inhale 1 puff once " daily.  Patient not taking: Reported on 6/11/2024 4/6/23   Amy Moreland MD   pantoprazole sodium (PANTOPRAZOLE ORAL) Take 1 tablet by mouth. Twice daily  Pantoprazole Sodium 40 MG Oral tablet delayed release    Historical Provider, MD   triamterene-hydrochlorothiazid (Maxzide-25) 37.5-25 mg tablet Take 1 tablet by mouth once daily.  Patient not taking: Reported on 6/11/2024 10/23/23   Amy Moreland MD   baricitinib (Olumiant) 2 mg tablet Take 1 tablet (2 mg) by mouth once daily. 5/2/24 6/11/24  Emily Augustin PA-C   beclomethasone HFA (Qvar RediHaler) 40 mcg/actuation inhaler 1 puff bid 9/2/21 6/11/24  Historical Provider, MD   ipratropium (Atrovent HFA) 17 mcg/actuation inhaler Inhale 2 puffs. 4 times daily as needed for mild intermittent asthma with acute exacerbation  6/11/24  Historical Provider, MD   naproxen (Naprosyn) 500 mg tablet Take 1 tablet (500 mg) by mouth every 12 (twelve) hours. With food 7/17/22 6/11/24  Historical Provider, MD FALL ROS:   Constitutional:   neg    Neuro/Psych:   neg    Eyes:   neg    Ears:   neg    Nose:   Mouth:   neg    Throat:   neg    Neck:   neg    Cardio:   neg    Respiratory:   neg    Endocrine:   neg    GI:   neg    :   neg    Musculoskeletal:    +LT knee pain  Hematologic:   neg    Skin:  neg        Physical Exam  Vitals and nursing note reviewed.   Constitutional:       General: She is not in acute distress.     Appearance: She is not ill-appearing or toxic-appearing.   HENT:      Head: Normocephalic and atraumatic.      Nose: Nose normal.      Mouth/Throat:      Mouth: Mucous membranes are moist.   Eyes:      Conjunctiva/sclera: Conjunctivae normal.      Pupils: Pupils are equal, round, and reactive to light.   Neck:      Vascular: No carotid bruit.   Cardiovascular:      Rate and Rhythm: Normal rate and regular rhythm.      Pulses: Normal pulses.      Heart sounds: Normal heart sounds. No murmur heard.  Pulmonary:      Effort: Pulmonary effort is  normal.      Breath sounds: Normal breath sounds.   Abdominal:      General: There is no distension.      Palpations: Abdomen is soft.      Tenderness: There is no abdominal tenderness.   Musculoskeletal:         General: Normal range of motion.      Cervical back: Normal range of motion.   Skin:     General: Skin is warm and dry.      Capillary Refill: Capillary refill takes less than 2 seconds.   Neurological:      General: No focal deficit present.      Mental Status: She is alert.   Psychiatric:         Mood and Affect: Mood normal.         Behavior: Behavior normal.          PAT AIRWAY:   Airway:     Mallampati::  II    Neck ROM::  Full         Visit Vitals  /87   Pulse 67   Temp 36.4 °C (97.6 °F) (Temporal)   Resp 14       DASI Risk Score      Flowsheet Row Most Recent Value   DASI SCORE 34.7   METS Score (Will be calculated only when all the questions are answered) 7          Caprini DVT Assessment      Flowsheet Row Most Recent Value   DVT Score 9   Current Status Elective major lower extremity arthroplasty   Age 40-59 years   BMI 41-50 (Morbid obesity)          Modified Frailty Index      Flowsheet Row Most Recent Value   Modified Frailty Index Calculator .1818          CHADS2 Stroke Risk  Current as of 6 minutes ago        N/A 3 to 100%: High Risk   2 to < 3%: Medium Risk   0 to < 2%: Low Risk     Last Change: N/A          This score determines the patient's risk of having a stroke if the patient has atrial fibrillation.        This score is not applicable to this patient. Components are not calculated.          Revised Cardiac Risk Index      Flowsheet Row Most Recent Value   Revised Cardiac Risk Calculator 0          Apfel Simplified Score      Flowsheet Row Most Recent Value   Apfel Simplified Score Calculator 2          Risk Analysis Index Results This Encounter    No data found in the last 1 encounters.       Stop Bang Score      Flowsheet Row Most Recent Value   Do you snore loudly? 1   Do you  often feel tired or fatigued after your sleep? 0   Has anyone ever observed you stop breathing in your sleep? 0   Do you have or are you being treated for high blood pressure? 1   Recent BMI (Calculated) 40.3   Is BMI greater than 35 kg/m2? 1=Yes   Age older than 50 years old? 1=Yes   Is your neck circumference greater than 17 inches (Male) or 16 inches (Female)? 1   Gender - Male 0=No   STOP-BANG Total Score 5            Assessment and Plan:   Neuro:  No diagnosis or significant findings on chart review or clinical presentation and evaluation.   Preoperative brain exercise educational handout provided to patient.    HEENT/Airway:  No diagnosis or significant findings on chart review or clinical presentation and evaluation.    Cardiovascular:   -HTN - on triamterene-hydrochlorothiazide (continue), losartan (hod 24 hours)  -HLD - on simvastatin (continue), fenofibrate (hold 24 hours)    ASAIII  EKG  with sinus bradycardia with sinus arrhythmia, reviewed with alicia Spear to proceed, no additional testing needed at this time  METS are 7  RCRI  0 which is 3.9%  30 day risk of MACE (risk for cardiac death, nonfatal myocardial infarction, and nonfactal cardiac arrest  NOA score which indicates a  0.1% risk of intraoperative or 30-day postoperative MACE      Pulmonary:    -Asthma - uses albuterol inhaler, no recent exacerbations, no hospitalizations; no wheezes/rhonchi/rales  Preoperative deep breathing educational handout provided to patient.    ARISCAT:    19  points which is a low (1.6%) risk of in-hospital post-op pulmonary complications   PRODIGY:  5  points which is a low risk of post op opioid induced respiratory depression episodes  STOP BAN  points which is a high risk for moderate to severe LAUREN    Renal: The patient is at increased risk of perioperative renal complications secondary to age>/= 56, HTN, and diabetes. Preventative measures include preoperative hydration, blood pressure control  CMP  ordered today    Endocrine:   -DM - on metformin (hold day of surgery),  glimepiride (hold day of surgery), ozempic (hold 7 days); most recent A1C on 05/2/24 reviewed 4.9    Hematologic:  No diagnosis or significant findings on chart review or clinical presentation and evaluation.  Preoperative DVT educational handout provided to patient. CBC w/ hemoglobin of 11.4 from 05/02/2024    Caprini Score:  9  points which is a highest risk of perioperative VTE    Gastrointestinal:   No diagnosis or significant findings on chart review or clinical presentation and evaluation.    Apfel: 2 points 39%% risk for post operative N/V    Infectious disease:    Patient provided with CHG body wash. CHG use instructions reviewed and provided to patient. Chlorhexidine .12% Dental Rinse e-prescribed per  infection prevention protocol. Patient educated.   Patient advised to call [facility] if mouthwash not received.   Staph screen collected    Musculoskeletal:    -osteoarthritis - scheduled for surgery      Anesthesia:  No anesthesia complications  No dental issues  No tobacco use  No personal/family issues with Anesthesia  No nickel, shell fish, or iodine allergies    Discussed with patient medication instructions, NPO guidelines, and any questions or concerns. Patient does not need further workup prior to preceding with elective surgery based on based on risk assessment.         Face to Face patient contact time 30 minutes    Codi Galarza PA-C 6/11/2024 9:41 AM      Pending labs ordered:  cbc and MSSA/MRSA culture  Follow up needed: labs

## 2024-06-12 ENCOUNTER — TELEPHONE (OUTPATIENT)
Dept: ORTHOPEDIC SURGERY | Facility: HOSPITAL | Age: 59
End: 2024-06-12
Payer: COMMERCIAL

## 2024-06-12 DIAGNOSIS — M17.12 PRIMARY OSTEOARTHRITIS OF LEFT KNEE: ICD-10-CM

## 2024-06-12 NOTE — TELEPHONE ENCOUNTER
Please call [phone] at your earliest convenience to discuss details for your upcoming surgery with Dr. Gus Glover.  I can be reached during normal business hours, Monday through Friday.    Thanks,  Larissa Centeno MBA, BSN, RN-BC  ALINA MurphyN-RN  Orthopedic Program Navigators  [facility]  [phone]

## 2024-06-12 NOTE — TELEPHONE ENCOUNTER
Thank you for taking my call today.  All questions were answered at the time of the call, but please feel free to reach out to me via MyChart or phone, [phone], with any new questions or concerns.       We confirmed that you opted to enroll in our [facility] program so your discharge prescriptions will be available to take home at the time of discharge.  Please bring any prescription insurance coverage with you on the morning of surgery so that we can enter the information into our system.     We confirmed that your plan would be to Stay Overnight.    We confirmed that you have DME needed for recovery.     Use the provided body wash for 4 days before surgery and complete a 5th shower on the morning of surgery, this includes your body and hair.  Follow the directions as provided during preadmission testing.  The mouth wash will be used the night before and the morning of surgery.       As a reminder, if you do not hear from our team, please call [phone] between 2pm and 3pm the business day before your surgery to confirm your arrival time and details.        Please don't hesitate to reach out with additional questions or concerns.    Larissa Centeno MBA, BSN, RN-BC  ALINA MurphyN, RN  Orthopedic Program Navigators  [facility]  [phone]

## 2024-06-13 LAB — STAPHYLOCOCCUS SPEC CULT: NORMAL

## 2024-06-18 ENCOUNTER — OFFICE VISIT (OUTPATIENT)
Dept: DERMATOLOGY | Facility: CLINIC | Age: 59
End: 2024-06-18
Payer: COMMERCIAL

## 2024-06-18 DIAGNOSIS — L63.9 ALOPECIA AREATA: ICD-10-CM

## 2024-06-20 RX ORDER — OXYCODONE HYDROCHLORIDE 5 MG/1
5-10 TABLET ORAL EVERY 6 HOURS PRN
Qty: 40 TABLET | Refills: 0 | Status: SHIPPED | OUTPATIENT
Start: 2024-06-20 | End: 2024-06-28

## 2024-06-20 RX ORDER — SENNOSIDES 8.6 MG/1
1 TABLET ORAL DAILY
Qty: 15 TABLET | Refills: 0 | Status: SHIPPED | OUTPATIENT
Start: 2024-06-20 | End: 2024-07-06

## 2024-06-20 RX ORDER — NAPROXEN SODIUM 220 MG/1
81 TABLET, FILM COATED ORAL 2 TIMES DAILY
Qty: 60 TABLET | Refills: 0 | Status: SHIPPED | OUTPATIENT
Start: 2024-06-20 | End: 2024-07-21

## 2024-06-20 RX ORDER — ONDANSETRON 4 MG/1
4 TABLET, FILM COATED ORAL EVERY 8 HOURS PRN
Qty: 20 TABLET | Refills: 0 | Status: SHIPPED | OUTPATIENT
Start: 2024-06-20

## 2024-06-20 RX ORDER — PANTOPRAZOLE SODIUM 40 MG/1
40 TABLET, DELAYED RELEASE ORAL
Qty: 30 TABLET | Refills: 0 | Status: SHIPPED | OUTPATIENT
Start: 2024-06-20 | End: 2024-07-21

## 2024-06-20 RX ORDER — ACETAMINOPHEN 500 MG
1000 TABLET ORAL EVERY 8 HOURS
Qty: 60 TABLET | Refills: 1 | Status: SHIPPED | OUTPATIENT
Start: 2024-06-20 | End: 2024-07-11

## 2024-06-20 RX ORDER — MELOXICAM 15 MG/1
15 TABLET ORAL DAILY
Qty: 30 TABLET | Refills: 0 | Status: SHIPPED | OUTPATIENT
Start: 2024-06-20 | End: 2024-07-21

## 2024-06-20 RX ORDER — DOCUSATE SODIUM 100 MG/1
100 CAPSULE, LIQUID FILLED ORAL 2 TIMES DAILY
Qty: 30 CAPSULE | Refills: 0 | Status: SHIPPED | OUTPATIENT
Start: 2024-06-20 | End: 2024-07-06

## 2024-06-20 RX ORDER — CEFADROXIL 500 MG/1
500 CAPSULE ORAL 2 TIMES DAILY
Qty: 10 CAPSULE | Refills: 0 | Status: SHIPPED | OUTPATIENT
Start: 2024-06-20 | End: 2024-06-26

## 2024-06-21 ENCOUNTER — HOME HEALTH ADMISSION (OUTPATIENT)
Dept: HOME HEALTH SERVICES | Facility: HOME HEALTH | Age: 59
End: 2024-06-21
Payer: COMMERCIAL

## 2024-06-21 ENCOUNTER — ANESTHESIA (OUTPATIENT)
Dept: OPERATING ROOM | Facility: HOSPITAL | Age: 59
End: 2024-06-21
Payer: COMMERCIAL

## 2024-06-21 ENCOUNTER — PHARMACY VISIT (OUTPATIENT)
Dept: PHARMACY | Facility: CLINIC | Age: 59
End: 2024-06-21
Payer: MEDICARE

## 2024-06-21 ENCOUNTER — ANESTHESIA EVENT (OUTPATIENT)
Dept: OPERATING ROOM | Facility: HOSPITAL | Age: 59
End: 2024-06-21
Payer: COMMERCIAL

## 2024-06-21 ENCOUNTER — HOSPITAL ENCOUNTER (OUTPATIENT)
Facility: HOSPITAL | Age: 59
Discharge: HOME | End: 2024-06-22
Attending: STUDENT IN AN ORGANIZED HEALTH CARE EDUCATION/TRAINING PROGRAM | Admitting: STUDENT IN AN ORGANIZED HEALTH CARE EDUCATION/TRAINING PROGRAM
Payer: COMMERCIAL

## 2024-06-21 ENCOUNTER — APPOINTMENT (OUTPATIENT)
Dept: RADIOLOGY | Facility: HOSPITAL | Age: 59
End: 2024-06-21
Payer: COMMERCIAL

## 2024-06-21 DIAGNOSIS — M17.12 PRIMARY OSTEOARTHRITIS OF LEFT KNEE: Primary | ICD-10-CM

## 2024-06-21 PROBLEM — Z96.652 S/P TOTAL KNEE ARTHROPLASTY, LEFT: Status: ACTIVE | Noted: 2024-06-21

## 2024-06-21 LAB
GLUCOSE BLD MANUAL STRIP-MCNC: 131 MG/DL (ref 74–99)
GLUCOSE BLD MANUAL STRIP-MCNC: 172 MG/DL (ref 74–99)
GLUCOSE BLD MANUAL STRIP-MCNC: 264 MG/DL (ref 74–99)
GLUCOSE BLD MANUAL STRIP-MCNC: 96 MG/DL (ref 74–99)

## 2024-06-21 PROCEDURE — 2500000005 HC RX 250 GENERAL PHARMACY W/O HCPCS: Performed by: STUDENT IN AN ORGANIZED HEALTH CARE EDUCATION/TRAINING PROGRAM

## 2024-06-21 PROCEDURE — 3700000002 HC GENERAL ANESTHESIA TIME - EACH INCREMENTAL 1 MINUTE: Performed by: STUDENT IN AN ORGANIZED HEALTH CARE EDUCATION/TRAINING PROGRAM

## 2024-06-21 PROCEDURE — 2500000004 HC RX 250 GENERAL PHARMACY W/ HCPCS (ALT 636 FOR OP/ED): Performed by: ANESTHESIOLOGY

## 2024-06-21 PROCEDURE — 2500000004 HC RX 250 GENERAL PHARMACY W/ HCPCS (ALT 636 FOR OP/ED): Performed by: NURSE ANESTHETIST, CERTIFIED REGISTERED

## 2024-06-21 PROCEDURE — 73560 X-RAY EXAM OF KNEE 1 OR 2: CPT | Mod: LT

## 2024-06-21 PROCEDURE — C1776 JOINT DEVICE (IMPLANTABLE): HCPCS | Performed by: STUDENT IN AN ORGANIZED HEALTH CARE EDUCATION/TRAINING PROGRAM

## 2024-06-21 PROCEDURE — 97110 THERAPEUTIC EXERCISES: CPT | Mod: GP

## 2024-06-21 PROCEDURE — 2500000005 HC RX 250 GENERAL PHARMACY W/O HCPCS

## 2024-06-21 PROCEDURE — 2500000004 HC RX 250 GENERAL PHARMACY W/ HCPCS (ALT 636 FOR OP/ED): Performed by: STUDENT IN AN ORGANIZED HEALTH CARE EDUCATION/TRAINING PROGRAM

## 2024-06-21 PROCEDURE — 2500000005 HC RX 250 GENERAL PHARMACY W/O HCPCS: Performed by: ANESTHESIOLOGIST ASSISTANT

## 2024-06-21 PROCEDURE — RXMED WILLOW AMBULATORY MEDICATION CHARGE

## 2024-06-21 PROCEDURE — 2500000004 HC RX 250 GENERAL PHARMACY W/ HCPCS (ALT 636 FOR OP/ED): Mod: JZ | Performed by: STUDENT IN AN ORGANIZED HEALTH CARE EDUCATION/TRAINING PROGRAM

## 2024-06-21 PROCEDURE — 3700000001 HC GENERAL ANESTHESIA TIME - INITIAL BASE CHARGE: Performed by: STUDENT IN AN ORGANIZED HEALTH CARE EDUCATION/TRAINING PROGRAM

## 2024-06-21 PROCEDURE — 27447 TOTAL KNEE ARTHROPLASTY: CPT

## 2024-06-21 PROCEDURE — 7100000011 HC EXTENDED STAY RECOVERY HOURLY - NURSING UNIT

## 2024-06-21 PROCEDURE — A4649 SURGICAL SUPPLIES: HCPCS | Performed by: STUDENT IN AN ORGANIZED HEALTH CARE EDUCATION/TRAINING PROGRAM

## 2024-06-21 PROCEDURE — 7100000002 HC RECOVERY ROOM TIME - EACH INCREMENTAL 1 MINUTE: Performed by: STUDENT IN AN ORGANIZED HEALTH CARE EDUCATION/TRAINING PROGRAM

## 2024-06-21 PROCEDURE — 7100000001 HC RECOVERY ROOM TIME - INITIAL BASE CHARGE: Performed by: STUDENT IN AN ORGANIZED HEALTH CARE EDUCATION/TRAINING PROGRAM

## 2024-06-21 PROCEDURE — 3600000005 HC OR TIME - INITIAL BASE CHARGE - PROCEDURE LEVEL FIVE: Performed by: STUDENT IN AN ORGANIZED HEALTH CARE EDUCATION/TRAINING PROGRAM

## 2024-06-21 PROCEDURE — 2500000002 HC RX 250 W HCPCS SELF ADMINISTERED DRUGS (ALT 637 FOR MEDICARE OP, ALT 636 FOR OP/ED): Performed by: PHYSICIAN ASSISTANT

## 2024-06-21 PROCEDURE — 27447 TOTAL KNEE ARTHROPLASTY: CPT | Performed by: STUDENT IN AN ORGANIZED HEALTH CARE EDUCATION/TRAINING PROGRAM

## 2024-06-21 PROCEDURE — 94760 N-INVAS EAR/PLS OXIMETRY 1: CPT | Mod: 59

## 2024-06-21 PROCEDURE — 82947 ASSAY GLUCOSE BLOOD QUANT: CPT | Mod: 59

## 2024-06-21 PROCEDURE — 2500000004 HC RX 250 GENERAL PHARMACY W/ HCPCS (ALT 636 FOR OP/ED)

## 2024-06-21 PROCEDURE — 3600000010 HC OR TIME - EACH INCREMENTAL 1 MINUTE - PROCEDURE LEVEL FIVE: Performed by: STUDENT IN AN ORGANIZED HEALTH CARE EDUCATION/TRAINING PROGRAM

## 2024-06-21 PROCEDURE — 2500000004 HC RX 250 GENERAL PHARMACY W/ HCPCS (ALT 636 FOR OP/ED): Performed by: ANESTHESIOLOGIST ASSISTANT

## 2024-06-21 PROCEDURE — C1713 ANCHOR/SCREW BN/BN,TIS/BN: HCPCS | Performed by: STUDENT IN AN ORGANIZED HEALTH CARE EDUCATION/TRAINING PROGRAM

## 2024-06-21 PROCEDURE — 2500000002 HC RX 250 W HCPCS SELF ADMINISTERED DRUGS (ALT 637 FOR MEDICARE OP, ALT 636 FOR OP/ED)

## 2024-06-21 PROCEDURE — 2720000007 HC OR 272 NO HCPCS: Performed by: STUDENT IN AN ORGANIZED HEALTH CARE EDUCATION/TRAINING PROGRAM

## 2024-06-21 PROCEDURE — 2500000001 HC RX 250 WO HCPCS SELF ADMINISTERED DRUGS (ALT 637 FOR MEDICARE OP): Performed by: STUDENT IN AN ORGANIZED HEALTH CARE EDUCATION/TRAINING PROGRAM

## 2024-06-21 PROCEDURE — 2500000002 HC RX 250 W HCPCS SELF ADMINISTERED DRUGS (ALT 637 FOR MEDICARE OP, ALT 636 FOR OP/ED): Performed by: STUDENT IN AN ORGANIZED HEALTH CARE EDUCATION/TRAINING PROGRAM

## 2024-06-21 PROCEDURE — 97161 PT EVAL LOW COMPLEX 20 MIN: CPT | Mod: GP

## 2024-06-21 PROCEDURE — 2780000003 HC OR 278 NO HCPCS: Performed by: STUDENT IN AN ORGANIZED HEALTH CARE EDUCATION/TRAINING PROGRAM

## 2024-06-21 PROCEDURE — 73560 X-RAY EXAM OF KNEE 1 OR 2: CPT | Mod: LEFT SIDE | Performed by: RADIOLOGY

## 2024-06-21 PROCEDURE — 2500000001 HC RX 250 WO HCPCS SELF ADMINISTERED DRUGS (ALT 637 FOR MEDICARE OP): Performed by: ANESTHESIOLOGY

## 2024-06-21 PROCEDURE — 2500000001 HC RX 250 WO HCPCS SELF ADMINISTERED DRUGS (ALT 637 FOR MEDICARE OP)

## 2024-06-21 PROCEDURE — 97116 GAIT TRAINING THERAPY: CPT | Mod: GP

## 2024-06-21 DEVICE — TOBRA FULL DOSE ANTIBIOTIC BONE CEMENT, 10 PACK CATALOG NUMBER IS 6197-9-010
Type: IMPLANTABLE DEVICE | Site: KNEE | Status: FUNCTIONAL
Brand: SIMPLEX

## 2024-06-21 DEVICE — ATTUNE KNEE SYSTEM TIBIAL BASE FIXED BEARING SIZE 3 CEMENTED
Type: IMPLANTABLE DEVICE | Site: KNEE | Status: FUNCTIONAL
Brand: ATTUNE

## 2024-06-21 DEVICE — ATTUNE PATELLA MEDIALIZED DOME 32MM CEMENTED AOX
Type: IMPLANTABLE DEVICE | Site: KNEE | Status: FUNCTIONAL
Brand: ATTUNE

## 2024-06-21 DEVICE — ATTUNE KNEE SYSTEM FEMORAL CRUCIATE RETAINING NARROW SIZE 5N LEFT CEMENTED
Type: IMPLANTABLE DEVICE | Site: KNEE | Status: FUNCTIONAL
Brand: ATTUNE

## 2024-06-21 RX ORDER — POLYETHYLENE GLYCOL 3350 17 G/17G
17 POWDER, FOR SOLUTION ORAL DAILY
Status: DISCONTINUED | OUTPATIENT
Start: 2024-06-21 | End: 2024-06-22 | Stop reason: HOSPADM

## 2024-06-21 RX ORDER — LABETALOL HYDROCHLORIDE 5 MG/ML
10 INJECTION, SOLUTION INTRAVENOUS ONCE AS NEEDED
Status: DISCONTINUED | OUTPATIENT
Start: 2024-06-21 | End: 2024-06-21 | Stop reason: HOSPADM

## 2024-06-21 RX ORDER — DOCUSATE SODIUM 100 MG/1
100 CAPSULE, LIQUID FILLED ORAL 2 TIMES DAILY
Status: DISCONTINUED | OUTPATIENT
Start: 2024-06-21 | End: 2024-06-22 | Stop reason: HOSPADM

## 2024-06-21 RX ORDER — TRANEXAMIC ACID 100 MG/ML
INJECTION, SOLUTION INTRAVENOUS AS NEEDED
Status: DISCONTINUED | OUTPATIENT
Start: 2024-06-21 | End: 2024-06-21

## 2024-06-21 RX ORDER — ONDANSETRON HYDROCHLORIDE 2 MG/ML
4 INJECTION, SOLUTION INTRAVENOUS EVERY 4 HOURS PRN
Status: DISCONTINUED | OUTPATIENT
Start: 2024-06-21 | End: 2024-06-22 | Stop reason: HOSPADM

## 2024-06-21 RX ORDER — ONDANSETRON HYDROCHLORIDE 2 MG/ML
4 INJECTION, SOLUTION INTRAVENOUS ONCE
Status: COMPLETED | OUTPATIENT
Start: 2024-06-21 | End: 2024-06-21

## 2024-06-21 RX ORDER — ALBUTEROL SULFATE 0.83 MG/ML
2.5 SOLUTION RESPIRATORY (INHALATION) EVERY 8 HOURS PRN
Status: DISCONTINUED | OUTPATIENT
Start: 2024-06-21 | End: 2024-06-21

## 2024-06-21 RX ORDER — FAMOTIDINE 20 MG/1
20 TABLET, FILM COATED ORAL ONCE
Status: COMPLETED | OUTPATIENT
Start: 2024-06-21 | End: 2024-06-21

## 2024-06-21 RX ORDER — ACETAMINOPHEN 325 MG/1
650 TABLET ORAL ONCE
Status: COMPLETED | OUTPATIENT
Start: 2024-06-21 | End: 2024-06-21

## 2024-06-21 RX ORDER — NORETHINDRONE AND ETHINYL ESTRADIOL 0.5-0.035
50 KIT ORAL ONCE
Status: DISCONTINUED | OUTPATIENT
Start: 2024-06-21 | End: 2024-06-21 | Stop reason: HOSPADM

## 2024-06-21 RX ORDER — SODIUM CHLORIDE, SODIUM LACTATE, POTASSIUM CHLORIDE, CALCIUM CHLORIDE 600; 310; 30; 20 MG/100ML; MG/100ML; MG/100ML; MG/100ML
100 INJECTION, SOLUTION INTRAVENOUS CONTINUOUS
Status: DISCONTINUED | OUTPATIENT
Start: 2024-06-21 | End: 2024-06-22

## 2024-06-21 RX ORDER — ATORVASTATIN CALCIUM 20 MG/1
10 TABLET, FILM COATED ORAL NIGHTLY
Status: DISCONTINUED | OUTPATIENT
Start: 2024-06-22 | End: 2024-06-22 | Stop reason: HOSPADM

## 2024-06-21 RX ORDER — FENOFIBRATE 160 MG/1
160 TABLET ORAL DAILY
Status: DISCONTINUED | OUTPATIENT
Start: 2024-06-21 | End: 2024-06-22 | Stop reason: HOSPADM

## 2024-06-21 RX ORDER — OXYCODONE HYDROCHLORIDE 5 MG/1
10 TABLET ORAL EVERY 6 HOURS PRN
Status: DISCONTINUED | OUTPATIENT
Start: 2024-06-21 | End: 2024-06-22

## 2024-06-21 RX ORDER — CEFAZOLIN SODIUM 2 G/100ML
2 INJECTION, SOLUTION INTRAVENOUS EVERY 8 HOURS
Status: COMPLETED | OUTPATIENT
Start: 2024-06-21 | End: 2024-06-22

## 2024-06-21 RX ORDER — NALOXONE HYDROCHLORIDE 0.4 MG/ML
0.2 INJECTION, SOLUTION INTRAMUSCULAR; INTRAVENOUS; SUBCUTANEOUS EVERY 5 MIN PRN
Status: DISCONTINUED | OUTPATIENT
Start: 2024-06-21 | End: 2024-06-22 | Stop reason: HOSPADM

## 2024-06-21 RX ORDER — PANTOPRAZOLE SODIUM 40 MG/1
40 TABLET, DELAYED RELEASE ORAL
Status: DISCONTINUED | OUTPATIENT
Start: 2024-06-22 | End: 2024-06-22 | Stop reason: HOSPADM

## 2024-06-21 RX ORDER — MIDAZOLAM HYDROCHLORIDE 1 MG/ML
INJECTION, SOLUTION INTRAMUSCULAR; INTRAVENOUS AS NEEDED
Status: DISCONTINUED | OUTPATIENT
Start: 2024-06-21 | End: 2024-06-21

## 2024-06-21 RX ORDER — ONDANSETRON HYDROCHLORIDE 2 MG/ML
4 INJECTION, SOLUTION INTRAVENOUS ONCE AS NEEDED
Status: COMPLETED | OUTPATIENT
Start: 2024-06-21 | End: 2024-06-21

## 2024-06-21 RX ORDER — ROPIVACAINE/EPI/CLONIDINE/KET 2.46-0.005
SYRINGE (ML) INJECTION AS NEEDED
Status: DISCONTINUED | OUTPATIENT
Start: 2024-06-21 | End: 2024-06-21 | Stop reason: HOSPADM

## 2024-06-21 RX ORDER — SODIUM CHLORIDE, SODIUM LACTATE, POTASSIUM CHLORIDE, CALCIUM CHLORIDE 600; 310; 30; 20 MG/100ML; MG/100ML; MG/100ML; MG/100ML
100 INJECTION, SOLUTION INTRAVENOUS CONTINUOUS
Status: DISCONTINUED | OUTPATIENT
Start: 2024-06-21 | End: 2024-06-21

## 2024-06-21 RX ORDER — ALBUTEROL SULFATE 0.83 MG/ML
2.5 SOLUTION RESPIRATORY (INHALATION) EVERY 4 HOURS PRN
Status: DISCONTINUED | OUTPATIENT
Start: 2024-06-21 | End: 2024-06-22 | Stop reason: HOSPADM

## 2024-06-21 RX ORDER — DIPHENHYDRAMINE HYDROCHLORIDE 50 MG/ML
12.5 INJECTION INTRAMUSCULAR; INTRAVENOUS ONCE AS NEEDED
Status: DISCONTINUED | OUTPATIENT
Start: 2024-06-21 | End: 2024-06-21 | Stop reason: HOSPADM

## 2024-06-21 RX ORDER — KETOROLAC TROMETHAMINE 15 MG/ML
15 INJECTION, SOLUTION INTRAMUSCULAR; INTRAVENOUS EVERY 6 HOURS
Status: COMPLETED | OUTPATIENT
Start: 2024-06-21 | End: 2024-06-22

## 2024-06-21 RX ORDER — PROPOFOL 10 MG/ML
INJECTION, EMULSION INTRAVENOUS AS NEEDED
Status: DISCONTINUED | OUTPATIENT
Start: 2024-06-21 | End: 2024-06-21

## 2024-06-21 RX ORDER — ONDANSETRON HYDROCHLORIDE 2 MG/ML
4 INJECTION, SOLUTION INTRAVENOUS EVERY 8 HOURS PRN
Status: DISCONTINUED | OUTPATIENT
Start: 2024-06-21 | End: 2024-06-21

## 2024-06-21 RX ORDER — TRANEXAMIC ACID 650 MG/1
1950 TABLET ORAL ONCE
Status: COMPLETED | OUTPATIENT
Start: 2024-06-21 | End: 2024-06-21

## 2024-06-21 RX ORDER — ALBUTEROL SULFATE 0.83 MG/ML
2.5 SOLUTION RESPIRATORY (INHALATION) ONCE AS NEEDED
Status: DISCONTINUED | OUTPATIENT
Start: 2024-06-21 | End: 2024-06-21 | Stop reason: HOSPADM

## 2024-06-21 RX ORDER — ALBUTEROL SULFATE 0.83 MG/ML
2.5 SOLUTION RESPIRATORY (INHALATION) ONCE
Status: DISCONTINUED | OUTPATIENT
Start: 2024-06-21 | End: 2024-06-21 | Stop reason: HOSPADM

## 2024-06-21 RX ORDER — FENTANYL CITRATE 50 UG/ML
INJECTION, SOLUTION INTRAMUSCULAR; INTRAVENOUS AS NEEDED
Status: DISCONTINUED | OUTPATIENT
Start: 2024-06-21 | End: 2024-06-21

## 2024-06-21 RX ORDER — ACETAMINOPHEN 325 MG/1
975 TABLET ORAL EVERY 8 HOURS
Status: DISCONTINUED | OUTPATIENT
Start: 2024-06-21 | End: 2024-06-22 | Stop reason: HOSPADM

## 2024-06-21 RX ORDER — METFORMIN HYDROCHLORIDE 500 MG/1
500 TABLET ORAL 2 TIMES DAILY
Status: DISCONTINUED | OUTPATIENT
Start: 2024-06-21 | End: 2024-06-22 | Stop reason: HOSPADM

## 2024-06-21 RX ORDER — HYDRALAZINE HYDROCHLORIDE 20 MG/ML
10 INJECTION INTRAMUSCULAR; INTRAVENOUS EVERY 30 MIN PRN
Status: DISCONTINUED | OUTPATIENT
Start: 2024-06-21 | End: 2024-06-21 | Stop reason: HOSPADM

## 2024-06-21 RX ORDER — METOCLOPRAMIDE 10 MG/1
10 TABLET ORAL ONCE
Status: COMPLETED | OUTPATIENT
Start: 2024-06-21 | End: 2024-06-21

## 2024-06-21 RX ORDER — SIMVASTATIN 20 MG/1
20 TABLET, FILM COATED ORAL NIGHTLY
Status: DISCONTINUED | OUTPATIENT
Start: 2024-06-21 | End: 2024-06-21

## 2024-06-21 RX ORDER — OXYCODONE HYDROCHLORIDE 5 MG/1
5 TABLET ORAL EVERY 6 HOURS PRN
Status: DISCONTINUED | OUTPATIENT
Start: 2024-06-21 | End: 2024-06-22

## 2024-06-21 RX ORDER — HYDROMORPHONE HYDROCHLORIDE 0.2 MG/ML
0.2 INJECTION INTRAMUSCULAR; INTRAVENOUS; SUBCUTANEOUS EVERY 4 HOURS PRN
Status: DISCONTINUED | OUTPATIENT
Start: 2024-06-21 | End: 2024-06-22 | Stop reason: HOSPADM

## 2024-06-21 RX ORDER — TALC
3 POWDER (GRAM) TOPICAL NIGHTLY PRN
Status: DISCONTINUED | OUTPATIENT
Start: 2024-06-21 | End: 2024-06-22 | Stop reason: HOSPADM

## 2024-06-21 RX ORDER — INSULIN LISPRO 100 [IU]/ML
0-15 INJECTION, SOLUTION INTRAVENOUS; SUBCUTANEOUS
Status: DISCONTINUED | OUTPATIENT
Start: 2024-06-21 | End: 2024-06-21

## 2024-06-21 RX ORDER — MIDAZOLAM HYDROCHLORIDE 1 MG/ML
2 INJECTION, SOLUTION INTRAMUSCULAR; INTRAVENOUS ONCE
Status: COMPLETED | OUTPATIENT
Start: 2024-06-21 | End: 2024-06-21

## 2024-06-21 RX ORDER — CYCLOBENZAPRINE HCL 10 MG
5 TABLET ORAL 3 TIMES DAILY PRN
Status: DISCONTINUED | OUTPATIENT
Start: 2024-06-21 | End: 2024-06-22 | Stop reason: HOSPADM

## 2024-06-21 RX ORDER — ONDANSETRON HYDROCHLORIDE 2 MG/ML
INJECTION, SOLUTION INTRAVENOUS AS NEEDED
Status: DISCONTINUED | OUTPATIENT
Start: 2024-06-21 | End: 2024-06-21

## 2024-06-21 RX ORDER — ATORVASTATIN CALCIUM 20 MG/1
10 TABLET, FILM COATED ORAL NIGHTLY
Status: DISCONTINUED | OUTPATIENT
Start: 2024-06-21 | End: 2024-06-21

## 2024-06-21 RX ORDER — METOPROLOL TARTRATE 1 MG/ML
INJECTION, SOLUTION INTRAVENOUS AS NEEDED
Status: DISCONTINUED | OUTPATIENT
Start: 2024-06-21 | End: 2024-06-21

## 2024-06-21 RX ORDER — HYDROMORPHONE HYDROCHLORIDE 0.2 MG/ML
0.2 INJECTION INTRAMUSCULAR; INTRAVENOUS; SUBCUTANEOUS EVERY 5 MIN PRN
Status: DISCONTINUED | OUTPATIENT
Start: 2024-06-21 | End: 2024-06-21 | Stop reason: HOSPADM

## 2024-06-21 RX ORDER — ASPIRIN 81 MG/1
81 TABLET ORAL 2 TIMES DAILY
Status: DISCONTINUED | OUTPATIENT
Start: 2024-06-22 | End: 2024-06-22 | Stop reason: HOSPADM

## 2024-06-21 RX ORDER — PHENYLEPHRINE HCL IN 0.9% NACL 1 MG/10 ML
SYRINGE (ML) INTRAVENOUS AS NEEDED
Status: DISCONTINUED | OUTPATIENT
Start: 2024-06-21 | End: 2024-06-21

## 2024-06-21 RX ORDER — BUPIVACAINE HYDROCHLORIDE 7.5 MG/ML
INJECTION, SOLUTION EPIDURAL; RETROBULBAR AS NEEDED
Status: DISCONTINUED | OUTPATIENT
Start: 2024-06-21 | End: 2024-06-21

## 2024-06-21 RX ORDER — CEFAZOLIN SODIUM 2 G/100ML
2 INJECTION, SOLUTION INTRAVENOUS ONCE
Status: COMPLETED | OUTPATIENT
Start: 2024-06-21 | End: 2024-06-21

## 2024-06-21 RX ORDER — ONDANSETRON 4 MG/1
4 TABLET, ORALLY DISINTEGRATING ORAL EVERY 8 HOURS PRN
Status: DISCONTINUED | OUTPATIENT
Start: 2024-06-21 | End: 2024-06-21

## 2024-06-21 RX ORDER — SIMETHICONE 80 MG
80 TABLET,CHEWABLE ORAL 4 TIMES DAILY PRN
Status: DISCONTINUED | OUTPATIENT
Start: 2024-06-21 | End: 2024-06-22 | Stop reason: HOSPADM

## 2024-06-21 RX ORDER — INSULIN LISPRO 100 [IU]/ML
0-15 INJECTION, SOLUTION INTRAVENOUS; SUBCUTANEOUS
Status: DISCONTINUED | OUTPATIENT
Start: 2024-06-21 | End: 2024-06-22 | Stop reason: HOSPADM

## 2024-06-21 RX ORDER — GLIMEPIRIDE 2 MG/1
4 TABLET ORAL
Status: DISCONTINUED | OUTPATIENT
Start: 2024-06-22 | End: 2024-06-22 | Stop reason: HOSPADM

## 2024-06-21 RX ORDER — DEXTROSE 50 % IN WATER (D50W) INTRAVENOUS SYRINGE
12.5
Status: DISCONTINUED | OUTPATIENT
Start: 2024-06-21 | End: 2024-06-22 | Stop reason: HOSPADM

## 2024-06-21 RX ORDER — DEXTROSE 50 % IN WATER (D50W) INTRAVENOUS SYRINGE
25
Status: DISCONTINUED | OUTPATIENT
Start: 2024-06-21 | End: 2024-06-22 | Stop reason: HOSPADM

## 2024-06-21 RX ORDER — KETOROLAC TROMETHAMINE 15 MG/ML
15 INJECTION, SOLUTION INTRAMUSCULAR; INTRAVENOUS ONCE AS NEEDED
Status: DISCONTINUED | OUTPATIENT
Start: 2024-06-21 | End: 2024-06-21 | Stop reason: HOSPADM

## 2024-06-21 RX ORDER — LOSARTAN POTASSIUM 100 MG/1
100 TABLET ORAL DAILY
Status: DISCONTINUED | OUTPATIENT
Start: 2024-06-22 | End: 2024-06-22 | Stop reason: HOSPADM

## 2024-06-21 RX ORDER — FENTANYL CITRATE 50 UG/ML
50 INJECTION, SOLUTION INTRAMUSCULAR; INTRAVENOUS ONCE
Status: COMPLETED | OUTPATIENT
Start: 2024-06-21 | End: 2024-06-21

## 2024-06-21 RX ORDER — CELECOXIB 200 MG/1
200 CAPSULE ORAL ONCE
Status: COMPLETED | OUTPATIENT
Start: 2024-06-21 | End: 2024-06-21

## 2024-06-21 RX ORDER — MEPERIDINE HYDROCHLORIDE 25 MG/ML
12.5 INJECTION INTRAMUSCULAR; INTRAVENOUS; SUBCUTANEOUS EVERY 10 MIN PRN
Status: DISCONTINUED | OUTPATIENT
Start: 2024-06-21 | End: 2024-06-21 | Stop reason: HOSPADM

## 2024-06-21 RX ADMIN — ACETAMINOPHEN 975 MG: 325 TABLET ORAL at 18:18

## 2024-06-21 RX ADMIN — DOCUSATE SODIUM 100 MG: 100 CAPSULE, LIQUID FILLED ORAL at 22:01

## 2024-06-21 RX ADMIN — FENTANYL CITRATE 50 MCG: 50 INJECTION INTRAMUSCULAR; INTRAVENOUS at 11:54

## 2024-06-21 RX ADMIN — HYDROMORPHONE HYDROCHLORIDE 0.2 MG: 0.2 INJECTION, SOLUTION INTRAMUSCULAR; INTRAVENOUS; SUBCUTANEOUS at 22:00

## 2024-06-21 RX ADMIN — INSULIN LISPRO 9 UNITS: 100 INJECTION, SOLUTION INTRAVENOUS; SUBCUTANEOUS at 21:57

## 2024-06-21 RX ADMIN — ONDANSETRON 4 MG: 2 INJECTION INTRAMUSCULAR; INTRAVENOUS at 20:20

## 2024-06-21 RX ADMIN — POVIDONE-IODINE 1 APPLICATION: 5 SOLUTION TOPICAL at 09:55

## 2024-06-21 RX ADMIN — SODIUM CHLORIDE, SODIUM LACTATE, POTASSIUM CHLORIDE, AND CALCIUM CHLORIDE 100 ML/HR: 600; 310; 30; 20 INJECTION, SOLUTION INTRAVENOUS at 10:11

## 2024-06-21 RX ADMIN — INSULIN LISPRO 3 UNITS: 100 INJECTION, SOLUTION INTRAVENOUS; SUBCUTANEOUS at 18:13

## 2024-06-21 RX ADMIN — ONDANSETRON 4 MG: 2 INJECTION INTRAMUSCULAR; INTRAVENOUS at 15:40

## 2024-06-21 RX ADMIN — FENOFIBRATE 160 MG: 160 TABLET ORAL at 18:18

## 2024-06-21 RX ADMIN — TRANEXAMIC ACID 1950 MG: 650 TABLET ORAL at 18:18

## 2024-06-21 RX ADMIN — MIDAZOLAM 2 MG: 1 INJECTION INTRAMUSCULAR; INTRAVENOUS at 11:54

## 2024-06-21 RX ADMIN — CYCLOBENZAPRINE 5 MG: 10 TABLET, FILM COATED ORAL at 20:31

## 2024-06-21 RX ADMIN — METFORMIN HYDROCHLORIDE 500 MG: 500 TABLET ORAL at 21:58

## 2024-06-21 RX ADMIN — METOCLOPRAMIDE 10 MG: 10 TABLET ORAL at 09:53

## 2024-06-21 RX ADMIN — CEFAZOLIN SODIUM 2 G: 2 INJECTION, SOLUTION INTRAVENOUS at 20:21

## 2024-06-21 RX ADMIN — Medication 2 L/MIN: at 16:30

## 2024-06-21 RX ADMIN — SODIUM CHLORIDE, SODIUM LACTATE, POTASSIUM CHLORIDE, AND CALCIUM CHLORIDE 100 ML/HR: 600; 310; 30; 20 INJECTION, SOLUTION INTRAVENOUS at 18:19

## 2024-06-21 RX ADMIN — OXYCODONE 5 MG: 5 TABLET ORAL at 18:20

## 2024-06-21 RX ADMIN — CELECOXIB 200 MG: 200 CAPSULE ORAL at 09:53

## 2024-06-21 RX ADMIN — FAMOTIDINE 20 MG: 20 TABLET, FILM COATED ORAL at 09:53

## 2024-06-21 RX ADMIN — KETOROLAC TROMETHAMINE 15 MG: 15 INJECTION, SOLUTION INTRAMUSCULAR; INTRAVENOUS at 18:17

## 2024-06-21 RX ADMIN — KETOROLAC TROMETHAMINE 15 MG: 15 INJECTION, SOLUTION INTRAMUSCULAR; INTRAVENOUS at 21:58

## 2024-06-21 RX ADMIN — ACETAMINOPHEN 650 MG: 325 TABLET ORAL at 09:53

## 2024-06-21 SDOH — SOCIAL STABILITY: SOCIAL INSECURITY: ARE THERE ANY APPARENT SIGNS OF INJURIES/BEHAVIORS THAT COULD BE RELATED TO ABUSE/NEGLECT?: NO

## 2024-06-21 SDOH — SOCIAL STABILITY: SOCIAL INSECURITY: ABUSE: ADULT

## 2024-06-21 SDOH — SOCIAL STABILITY: SOCIAL INSECURITY: DOES ANYONE TRY TO KEEP YOU FROM HAVING/CONTACTING OTHER FRIENDS OR DOING THINGS OUTSIDE YOUR HOME?: NO

## 2024-06-21 SDOH — SOCIAL STABILITY: SOCIAL INSECURITY: HAVE YOU HAD THOUGHTS OF HARMING ANYONE ELSE?: NO

## 2024-06-21 SDOH — HEALTH STABILITY: MENTAL HEALTH: CURRENT SMOKER: 0

## 2024-06-21 SDOH — SOCIAL STABILITY: SOCIAL INSECURITY: DO YOU FEEL UNSAFE GOING BACK TO THE PLACE WHERE YOU ARE LIVING?: NO

## 2024-06-21 SDOH — SOCIAL STABILITY: SOCIAL INSECURITY: DO YOU FEEL ANYONE HAS EXPLOITED OR TAKEN ADVANTAGE OF YOU FINANCIALLY OR OF YOUR PERSONAL PROPERTY?: NO

## 2024-06-21 SDOH — SOCIAL STABILITY: SOCIAL INSECURITY: HAS ANYONE EVER THREATENED TO HURT YOUR FAMILY OR YOUR PETS?: NO

## 2024-06-21 SDOH — SOCIAL STABILITY: SOCIAL INSECURITY: ARE YOU OR HAVE YOU BEEN THREATENED OR ABUSED PHYSICALLY, EMOTIONALLY, OR SEXUALLY BY ANYONE?: NO

## 2024-06-21 SDOH — SOCIAL STABILITY: SOCIAL INSECURITY: WERE YOU ABLE TO COMPLETE ALL THE BEHAVIORAL HEALTH SCREENINGS?: YES

## 2024-06-21 SDOH — SOCIAL STABILITY: SOCIAL INSECURITY: HAVE YOU HAD ANY THOUGHTS OF HARMING ANYONE ELSE?: NO

## 2024-06-21 ASSESSMENT — ACTIVITIES OF DAILY LIVING (ADL)
GROOMING: INDEPENDENT
PATIENT'S MEMORY ADEQUATE TO SAFELY COMPLETE DAILY ACTIVITIES?: YES
ADL_ASSISTANCE: INDEPENDENT
WALKS IN HOME: INDEPENDENT
ADEQUATE_TO_COMPLETE_ADL: YES
HEARING - RIGHT EAR: FUNCTIONAL
TOILETING: NEEDS ASSISTANCE
BATHING: INDEPENDENT
HEARING - LEFT EAR: FUNCTIONAL
ASSISTIVE_DEVICE: WALKER
JUDGMENT_ADEQUATE_SAFELY_COMPLETE_DAILY_ACTIVITIES: YES
DRESSING YOURSELF: INDEPENDENT
FEEDING YOURSELF: INDEPENDENT
ADLS_ADDRESSED: YES
LACK_OF_TRANSPORTATION: NO

## 2024-06-21 ASSESSMENT — COGNITIVE AND FUNCTIONAL STATUS - GENERAL
STANDING UP FROM CHAIR USING ARMS: A LITTLE
DRESSING REGULAR UPPER BODY CLOTHING: A LITTLE
MOVING TO AND FROM BED TO CHAIR: A LITTLE
TOILETING: A LITTLE
DAILY ACTIVITIY SCORE: 20
MOVING FROM LYING ON BACK TO SITTING ON SIDE OF FLAT BED WITH BEDRAILS: A LITTLE
DRESSING REGULAR LOWER BODY CLOTHING: A LITTLE
CLIMB 3 TO 5 STEPS WITH RAILING: A LITTLE
WALKING IN HOSPITAL ROOM: A LITTLE
MOBILITY SCORE: 18
TURNING FROM BACK TO SIDE WHILE IN FLAT BAD: A LITTLE
TURNING FROM BACK TO SIDE WHILE IN FLAT BAD: A LITTLE
WALKING IN HOSPITAL ROOM: A LITTLE
MOVING TO AND FROM BED TO CHAIR: A LITTLE
CLIMB 3 TO 5 STEPS WITH RAILING: A LITTLE
MOVING FROM LYING ON BACK TO SITTING ON SIDE OF FLAT BED WITH BEDRAILS: A LITTLE
DRESSING REGULAR LOWER BODY CLOTHING: A LITTLE
DRESSING REGULAR UPPER BODY CLOTHING: A LITTLE
TOILETING: A LITTLE
MOVING FROM LYING ON BACK TO SITTING ON SIDE OF FLAT BED WITH BEDRAILS: A LITTLE
PATIENT BASELINE BEDBOUND: NO
MOVING TO AND FROM BED TO CHAIR: A LITTLE
STANDING UP FROM CHAIR USING ARMS: A LITTLE
HELP NEEDED FOR BATHING: A LITTLE
MOBILITY SCORE: 18
STANDING UP FROM CHAIR USING ARMS: A LITTLE
HELP NEEDED FOR BATHING: A LITTLE
TURNING FROM BACK TO SIDE WHILE IN FLAT BAD: A LITTLE
WALKING IN HOSPITAL ROOM: A LITTLE
DAILY ACTIVITIY SCORE: 20
CLIMB 3 TO 5 STEPS WITH RAILING: A LOT
MOBILITY SCORE: 17

## 2024-06-21 ASSESSMENT — PAIN SCALES - GENERAL
PAINLEVEL_OUTOF10: 7
PAINLEVEL_OUTOF10: 4
PAINLEVEL_OUTOF10: 0 - NO PAIN
PAINLEVEL_OUTOF10: 9
PAINLEVEL_OUTOF10: 0 - NO PAIN
PAINLEVEL_OUTOF10: 9
PAINLEVEL_OUTOF10: 0 - NO PAIN
PAINLEVEL_OUTOF10: 9
PAINLEVEL_OUTOF10: 0 - NO PAIN
PAINLEVEL_OUTOF10: 0 - NO PAIN
PAIN_LEVEL: 2
PAINLEVEL_OUTOF10: 5 - MODERATE PAIN

## 2024-06-21 ASSESSMENT — PAIN - FUNCTIONAL ASSESSMENT
PAIN_FUNCTIONAL_ASSESSMENT: 0-10

## 2024-06-21 ASSESSMENT — LIFESTYLE VARIABLES
HOW MANY STANDARD DRINKS CONTAINING ALCOHOL DO YOU HAVE ON A TYPICAL DAY: PATIENT DOES NOT DRINK
AUDIT-C TOTAL SCORE: 0
HOW OFTEN DO YOU HAVE 6 OR MORE DRINKS ON ONE OCCASION: NEVER
HOW OFTEN DO YOU HAVE A DRINK CONTAINING ALCOHOL: NEVER
SKIP TO QUESTIONS 9-10: 1
AUDIT-C TOTAL SCORE: 0

## 2024-06-21 ASSESSMENT — PATIENT HEALTH QUESTIONNAIRE - PHQ9
SUM OF ALL RESPONSES TO PHQ9 QUESTIONS 1 & 2: 0
1. LITTLE INTEREST OR PLEASURE IN DOING THINGS: NOT AT ALL
2. FEELING DOWN, DEPRESSED OR HOPELESS: NOT AT ALL

## 2024-06-21 ASSESSMENT — PAIN DESCRIPTION - ORIENTATION: ORIENTATION: LEFT

## 2024-06-21 ASSESSMENT — PAIN DESCRIPTION - DESCRIPTORS: DESCRIPTORS: ACHING;SPASM

## 2024-06-21 ASSESSMENT — PAIN DESCRIPTION - LOCATION: LOCATION: KNEE

## 2024-06-21 NOTE — CONSULTS
Consults    History and Physical    Kim Yi is a 59 y.o. female on day 0 of admission presenting with Primary osteoarthritis of left knee.  Room: 221    Subjective   59 year old female with pmhx HTN, DMII, asthma (controlled), is s/p L TKR. Hemodynamically stable. No complaints.       PMHx:  Past Medical History:   Diagnosis Date    Alopecia areata     Scalp    Asthma (HHS-HCC)     during change of seasons    BMI 39.0-39.9,adult     GERD (gastroesophageal reflux disease)     History of transient ischemic attack (TIA)     approx ? Had numbness on left side body. No recurrence.    Hx of peptic ulcer     2009 EGD: Acute prepyloric gastric ulcer and erosive antral gastritis; pathology + H Pylori    Hyperlipidemia     Hypertension     Osteoarthritis     Pancreatitis (HHS-HCC)     x1 remotely    Type 2 diabetes mellitus without complications (Multi)       Past Surgical Hx:  Past Surgical History:  No date: APPENDECTOMY  2016: CARDIAC CATHETERIZATION      Comment:  CONCLUSIONS: Normal coronary arteries in a right                dominant system. Normal left ventricular systolic                function.  No date:  SECTION, CLASSIC  No date: CHOLECYSTECTOMY  No date: COLONOSCOPY  No date: CORNEAL TRANSPLANT; Left  No date: ESOPHAGOSCOPY / EGD  No date: GANGLION CYST EXCISION      Comment:  wrist and ankle  2024: TOTAL KNEE ARTHROPLASTY; Left  2007: VENTRAL HERNIA REPAIR    Social history:   reports that she has never smoked. She has never used smokeless tobacco. She reports that she does not currently use alcohol. She reports that she does not use drugs.    Family history: CAD and DMII    Allergies   Allergen Reactions    Other Hives, Other and Unknown     Darvocet-N 100 TABS    Other=Vomiting    Oxycodone-Acetaminophen Headache     Percocet TABS    Tramadol Headache     Home Medication:   Medication Details   fenofibrate (Triglide) 160 mg tablet Take 1 tablet (160 mg) by mouth once  "daily.   glimepiride (Amaryl) 4 mg tablet TAKE 1 TABLET BY MOUTH EVERY DAY WITH BREAKFAST   losartan (Cozaar) 100 mg tablet Take 1 tablet (100 mg) by mouth once daily.   metFORMIN  mg 24 hr tablet Take 1 tablet (500 mg) by mouth 2 times a day. Twice a day   semaglutide (OZEMPIC) 1 mg/dose (4 mg/3 mL) pen injector Inject 1 mg under the skin 1 every Monday   simvastatin (Zocor) 20 mg tablet Take 1 tablet (20 mg) by mouth once daily at bedtime. Daily (Please schedule an appointment)   triamterene-hydrochlorothiazid (Maxzide-25) 37.5-25 mg tablet Take 1 tablet by mouth once daily.   albuterol 0.63 mg/3 mL nebulizer solution Use 1 vial in nebulizer 3 times daily as needed   ALBUTEROL SULFATE INHL Inhale 2 puffs. Every 4-6 hours as directed  Albuterol Sulfate  (90 Base) MCG/ACT Inhalation Aerosol Solution (ProAir HFA)   fluocinonide (Lidex) 0.05 % external solution Apply topically to scalp BID.    Patient not taking: Reported on 6/11/2024   fluticasone furoate-vilanteroL (Breo Ellipta) 100-25 mcg/dose inhaler Inhale 1 puff once daily.    Patient not taking: Reported on 6/11/2024   pantoprazole sodium (PANTOPRAZOLE ORAL) Take 1 tablet by mouth. Twice daily  Pantoprazole Sodium 40 MG Oral tablet delayed release     Last Recorded Vitals  Blood pressure 126/68, pulse 73, temperature 36.4 °C (97.5 °F), temperature source Temporal, resp. rate 16, height 1.52 m (4' 11.84\"), weight 92.2 kg (203 lb 4.2 oz), SpO2 98%.    Physical Exam  General: Patient in no acute distress, lying in bed  HEENT: EOMI, moist mucous membranes, anicteric  Neck: No JVD, no cervical lymphadenopathy  Heart: RRR, no murmurs, rubs, or gallops  Lungs: Clear to auscultation bilaterally, no wheezing, rhonchi, or rales  Abdomen: Soft, nontender, nondistended, no organomegaly, mild ventral defect   Extremities: No peripheral edema, able to pump b/l ankles  Skin: No rash or cyanosis  Neurological: AOx3, non focal  Psychiatric: Cooperative and " pleasant    Recent Results:      Latest Reference Range & Units 05/02/24 15:33   GLUCOSE 74 - 99 mg/dL 77   SODIUM 136 - 145 mmol/L 141   POTASSIUM 3.5 - 5.3 mmol/L 4.4   CHLORIDE 98 - 107 mmol/L 107   Bicarbonate 21 - 32 mmol/L 27   Anion Gap 10 - 20 mmol/L 11   Blood Urea Nitrogen 6 - 23 mg/dL 15   Creatinine 0.50 - 1.05 mg/dL 0.74   EGFR >60 mL/min/1.73m*2 >90   Calcium 8.6 - 10.3 mg/dL 9.5   Albumin 3.4 - 5.0 g/dL 4.1      Latest Reference Range & Units 05/02/24 15:33   Hemoglobin A1C see below % 4.9      Latest Reference Range & Units 06/11/24 10:16   LEUKOCYTES (10*3/UL) IN BLOOD BY AUTOMATED COUNT, [place] 4.4 - 11.3 x10*3/uL 8.0   nRBC  COMMENT ONLY   ERYTHROCYTES (10*6/UL) IN BLOOD BY AUTOMATED COUNT, [place] 4.00 - 5.20 x10*6/uL 4.81   HEMOGLOBIN 12.0 - 16.0 g/dL 12.8   HEMATOCRIT 36.0 - 46.0 % 40.3   MCV 80 - 100 fL 84   MCH 26.0 - 34.0 pg 26.6   MCHC 32.0 - 36.0 g/dL 31.8 (L)   RED CELL DISTRIBUTION WIDTH 11.5 - 14.5 % 13.9   PLATELETS (10*3/UL) IN BLOOD AUTOMATED COUNT, [place] 150 - 450 x10*3/uL 233     Assessment/Plan   1) s/p L TKR   Ortho management   PT eval/treat  Incentive spirometry  BM management  Supportive Care  Antibiotic prophylaxis per ortho  DVT prophylaxis per ortho  2) DVT prophylaxis   Per Ortho management  81 mg ASA BID  SCDs  Ambulate as tolerated  3) hypertension   Continue amlodipine and losartan with BP parameters.  Hold diuretic  4) diabetes (well-controlled A1c 4.9)   Continue glimepiride and metformin.  Glucose checks before meals and at bedtime with sliding scale insulin as needed.  5) Asthma (controlled)   Albuterol neubs prn       I spent 45 minutes in the professional and overall care of this patient.      Nilam Romo PA-C

## 2024-06-21 NOTE — ANESTHESIA PROCEDURE NOTES
Spinal Block    Patient location during procedure: OR  Start time: 6/21/2024 12:14 PM  End time: 6/21/2024 12:20 PM  Reason for block: primary anesthetic  Staffing  Performed: ENRRIQUE   Authorized by: Solomon Cooper DO    Performed by: ENRRIQUE Osorio    Preanesthetic Checklist  Completed: patient identified, IV checked, risks and benefits discussed, surgical consent, monitors and equipment checked, pre-op evaluation, timeout performed and sterile techniques followed  Block Timeout  RN/Licensed healthcare professional reads aloud to the Anesthesia provider and entire team: Patient identity, procedure with side and site, patient position, and as applicable the availability of implants/special equipment/special requirements.    Timeout performed at: 6/21/2024 12:14 PM  Spinal Block  Patient position: sitting  Prep: Betadine  Sterility prep: cap, drape, gloves, hand hygiene and mask  Sedation level: light sedation  Patient monitoring: blood pressure, continuous pulse oximetry and heart rate  Approach: midline  Vertebral space: L3-4  Injection technique: single-shot  Needle  Needle type: pencil-point   Needle gauge: 25 G  Needle length: 3.5 in  Free flowing CSF: yes    Assessment  Sensory level: T10 bilateral  Procedure assessment: patient sedated but conversant throughout procedure and patient tolerated procedure well with no immediate complications  Additional Notes  1.4mL 0.75% marcaine in dextrose injected  Spinal kit exp: 05-  Lot # [id number]    (Pt is not rapid recovery)

## 2024-06-21 NOTE — PERIOPERATIVE NURSING NOTE
Pt meets criteria for tx to RNF. Attending anesthesia updated on pt status.  Report attempted, unable to take  thie time. Pt safety maintained, resting quietly, no s/s of discomfort

## 2024-06-21 NOTE — NURSING NOTE
Patient arrived from PACU  Patient's vitals were done   Dinner was encourage d  Call light withn reach

## 2024-06-21 NOTE — CARE PLAN
Problem: Balance  Goal: LTG - Patient will maintain balance to allow for safe mobility with least restrictive assistive device   Outcome: Progressing     Problem: Mobility  Goal: LTG - Patient will ambulate household distance MOD I with RW   Outcome: Progressing  Goal: LTG - Patient will navigate 4-6 steps with rails/device  Outcome: Progressing  Goal: LTG - Patient will demonstrate safe mobility requirements MOD I with RW   Outcome: Progressing     Problem: Safety  Goal: LTG - Patient will demonstrate safety requirements appropriate to situation/environment  Outcome: Progressing     Problem: PT Transfers  Goal: LTG - Patient will demonstrate safe transfer techniques MOD I with RW   Outcome: Progressing     Problem: Pain  Goal: LTG - Patient will manage pain with the appropriate technique/Intervention  Outcome: Progressing     Problem: Compromised Skin Integrity  Goal: LTG - Patient will maintain/improve skin integrity through proper skin care techniques  Outcome: Progressing

## 2024-06-21 NOTE — ANESTHESIA POSTPROCEDURE EVALUATION
Patient: Kim Yi    Procedure Summary       Date: 06/21/24 Room / Location: [facility] / Virtual [facility]    Anesthesia Start: 1208 Anesthesia Stop: 1459    Procedure: LEFT TOTAL KNEE ARTHROPLATY ( Depuy Attune Medial Stabilized ) (Left: Knee) Diagnosis:       Primary osteoarthritis of left knee      (Primary osteoarthritis of left knee [M17.12])    Surgeons: Gus Glover MD Responsible Provider: Solomon Cooper DO    Anesthesia Type: general, spinal ASA Status: 3            Anesthesia Type: general, spinal    Vitals Value Taken Time   /75 06/21/24 1550   Temp 36 °C (96.8 °F) 06/21/24 1456   Pulse 78 06/21/24 1550   Resp 14 06/21/24 1550   SpO2 99 % 06/21/24 1550       Anesthesia Post Evaluation    Patient location during evaluation: bedside  Patient participation: complete - patient participated  Level of consciousness: awake  Pain score: 2  Pain management: adequate  Airway patency: patent  Two or more strategies used to mitigate risk of obstructive sleep apnea  Cardiovascular status: acceptable  Respiratory status: acceptable  Hydration status: acceptable  Postoperative Nausea and Vomiting: none        No notable events documented.

## 2024-06-21 NOTE — ANESTHESIA PREPROCEDURE EVALUATION
Patient: Kim Yi    Procedure Information       Date/Time: 06/21/24 1130    Procedure: LEFT TOTAL KNEE ARTHROPLATY ( Depuy Attune Medial Stabilized ) (Left: Knee)    Location: [facility] / [facility]    Surgeons: Gus Glover MD            Relevant Problems   Cardiac   (+) Hypertension      Pulmonary   (+) Mild persistent asthma without complication (HHS-HCC)      Neuro   (+) Anxiety   (+) Carpal tunnel syndrome of left wrist      GI   (+) Gastro-esophageal reflux      Endocrine   (+) Obesity   (+) Type 2 diabetes mellitus with other specified complication, without long-term current use of insulin (Multi)      Hematology   (+) Anemia      Musculoskeletal   (+) Carpal tunnel syndrome of left wrist   (+) Primary localized osteoarthritis of both knees   (+) Primary osteoarthritis of left knee   (+) Primary osteoarthritis of right knee      GYN   (+) Menorrhagia with regular cycle       Clinical information reviewed:                   NPO Detail:  No data recorded     Physical Exam    Airway  Mallampati: II  TM distance: >3 FB     Cardiovascular   Rhythm: regular  Rate: normal     Dental - normal exam     Pulmonary   Breath sounds clear to auscultation     Abdominal   Abdomen: soft         Stress test 2019 NL    Anesthesia Plan    History of general anesthesia?: yes  History of complications of general anesthesia?: no    ASA 3     general and spinal     The patient is not a current smoker.    intravenous induction   Postoperative administration of opioids is intended.  Anesthetic plan and risks discussed with patient.    Plan discussed with CRNA, attending and CAA.

## 2024-06-21 NOTE — PROGRESS NOTES
06/21/24 1612   Discharge Planning   Living Arrangements Children   Support Systems Children   Assistance Needed HOME with 24/7 assist   Type of Residence Private residence   Number of Stairs to Enter Residence 3   Number of Stairs Within Residence 0   Do you have animals or pets at home? No   Who is requesting discharge planning? Provider   Home or Post Acute Services In home services   Type of Home Care Services Home PT   Patient expects to be discharged to: HOME with [facility]   Does the patient need discharge transport arranged? No   Financial Resource Strain   How hard is it for you to pay for the very basics like food, housing, medical care, and heating? Not hard   Housing Stability   In the last 12 months, was there a time when you were not able to pay the mortgage or rent on time? N   In the last 12 months, how many places have you lived? 1   In the last 12 months, was there a time when you did not have a steady place to sleep or slept in a shelter (including now)? N   Transportation Needs   In the past 12 months, has lack of transportation kept you from medical appointments or from getting medications? no   In the past 12 months, has lack of transportation kept you from meetings, work, or from getting things needed for daily living? No   Patient Choice   Provider Choice list and CMS website (https://medicare.gov/care-compare#search) for post-acute Quality and Resource Measure Data were provided and reviewed with: Patient   Patient / Family choosing to utilize agency / facility established prior to hospitalization No

## 2024-06-21 NOTE — DISCHARGE INSTRUCTIONS
Gus Glover MD MS  [address]   [address]  [phone] (office)  [phone] (fax)    PLEASE READ CAREFULLY BEFORE CONTACTING YOUR PROVIDER.    WE WORK COLLABORATIVELY AS A TEAM. CALLING MULTIPLE STAFF MEMBERS REGARDING THE SAME ISSUE WILL DELAY YOUR CARE.  [facility] IS THE PREFERRED COMMUNICATION FOR ALL TEAM MEMBERS.    Postoperative Instructions: TOTAL KNEE ARTHROPLASTY    JOINT CARE TEAM  Please use the information below to contact your care team following surgery.  If you are leaving a message, please include your full name, date of birth and date of surgery so that we can correctly identify you.  Your call will be returned within 1-2 business days, please do not leave multiple messages regarding a single issue while you are awaiting a return call.     Who to call Contact Information Matters needing handled   Gus Glover MD [facility]  [phone] Prescription Refills   Orders for dental antibiotics lifelong     Larissa Centeno MBA, BSN, RN-BC  NAMRATA Murphy, RN  Ortho Program Navigators - NAMRATA Smith, RN  Ortho Coordinator Colin FULLERN-BC  Ortho Nurse Navigator   [phone] Nursing, medical question related questions or concerns within 6 weeks of surgery   Orders for Outpatient Physical Therapy         [place]           [phone]     Scheduling office Visits  Medical questions/concerns  Leave of Absence or other paperwork  Any concerns more than 6 weeks from surgery - an appointment will need to be made     MEDICATION REFILLS - (MyChart or Main Office)    You will not receive a call indicating that your prescription has been filled.  Please contact your pharmacy with any questions.    Medication refills will be filled Monday-Friday 7am to 1pm ONLY. Please call the office or send a [facility] message for a refill request.  Any requests received outside of this timeframe will be handled on the next  business day.  The office staff and orthopedic nurses cannot refill medications; messages should be left directly through the office or via my chart.  Please do not call multiple times or call other members of the care team for medication needs, this will cause the refill to take longer.    Per State and Institutional policy, pain medications can only be refilled every 7 days for up to six weeks following surgery.    DRIVING & TRAVEL AFTER SURGERY   Patients should anticipate waiting at least 4-6 weeks before traveling long distances after surgery.  You will need to stop to walk around ever 1 hour during your travel to help with blood clot prevention.  Please call the office or your joint nurse to discuss prior to post-surgical travel.  Patients may not drive until cleared by the joint nurse or the office.    DENTAL PROCEDURES & CLEANINGS  You must wait a minimum of 3 months for elective dental appointments, including routine cleanings or dental work including bridges, crowns, extractions, etc..  For any dental visit - cleaning or dental procedures - patients must take an antibiotic 1 hour before the appointment.  Antibiotics are a lifelong need before dental appointments.  The antibiotic prescribed will be based on each patient's allergies.    WOUND CARE  Pain and swelling are normal following surgery and can last for weeks to months depending on the patient.  To help relieve these symptoms, please follow the post-operative pain regimen as it has been prescribed, use ice often, wear compression stockings every day as prescribed, and elevate your leg every hour.   Leaving the hospital, you have an ACE wrap in place. Two days after surgery, you can remove the ACE wrap and discard it. It does NOT need to be reapplied again.  You have a waterproof bandage on your wound and may shower with this on. The waterproof bandage is to remain in place for a 7 days. You or your home therapist should remove it at this time. You  may leave your incision open to air after the bandage has been removed.  You may shower 48 hours after surgery.  Do not scrub directly over or near the edges of your surgical bandage.  Soap and water may run freely over the site.  Under your waterproof bandage you have Steri-strips or a mesh covering in place. DO NOT peel them off. They will fall off on their own. You can continue to shower with these on. Let the water run freely over your incision when showering and do not scrub the incision or the surrounding area.   When drying the area around the incision, please use a SEPARATE towel that was not used on the rest of your body. The towel should be recently laundered but does not have to be cleaned a specific way. It should be laundered every 3 days. Pat the area dry. Do not rub the area around the incision. Steri strips will fall off in 1-2 weeks. If after 2 weeks they, have not, gently peel them off.  You may have clear stitches at the ends of your wound. Place Band-Aids on them for the first few weeks to prevent rubbing. You can gently tug on them after 2 weeks and they will come out or fall out on their own. DO NOT cut them. If they have not fallen out by you post-op visit, your doctor will remove them  If you have black stitches in place, your doctor will remove them in 2 weeks. These CANNOT get wet. If you have the silver waterproof bandage in place, you can shower. If the waterproof bandage is removed or not sticking, you CANNOT shower.  DO NOT soak your incision in a bath, hot tub, pool or pond/lake for a minimum of 12 weeks following your surgery.  DO NOT use lotions, creams, ointments on your wound for a minimum of 6 weeks following your surgery. At that time you may use vitamin E to assist with softening of your incision. Your physical therapist or doctor will talk to you about scar massage which can be started at 6 weeks.   You do NOT need to use the soap that was provided to you prior to surgery after  surgery. Use regular soap or shampoo.     PAIN, SWELLING, BRUISING & CLICKING  Pain and swelling are a natural part of your recovery which is considered normal fur up to a year after surgery.  Symptoms may be treated with movement, ice, compression stockings, elevating your leg, and by following the pain medication regimen as prescribed.  Bruising is normal for several weeks after surgery and will run down the leg over time.  You may ice areas that are tender to help with discomfort.  You are required to wear the provided compression stockings, every day, for 4 weeks following surgery.  Remove the stockings at night and place them back on in the morning.  Pain and swelling may temporarily increase with an increase in activity or exercise.  Use ice after activity.  Audible clicking with movement or exercises is considered normal following joint replacement.  You may also feel decreased sensation or numbness near the incision site.  These are usually normal and may or may not fade over time.     PERSONAL HYGIENE  You may shower upon discharging from the hospital.  Soap and water is permitted to run over the surgical dressing, steri-strips and incision.  Do not scrub directly over these items.  DO NOT soak your incision in a bath, hot tub, pool or pond/lake for a minimum of 12 weeks following your surgery.  DO NOT use lotions, creams, ointments on your wound for a minimum of 6 weeks following your surgery. At that time you may use vitamin E to assist with softening of your incision.      RESTARTING HOME ROUTINE - DIET & MEDICATIONS  Post-operative constipation can result due to a combination of inactivity, anesthesia and pain medication. To help prevent this, you should increase your water and fiber intake. Physical activity such as walking will also help stimulate the bowels.   You may resume your normal diet when you discharge home.  Choose foods that help promote good bowel habits and prevent constipation, such as  foods high in fiber.  You may restart your home medications the following day after your surgery UNLESS you have been given alternate instructions.  Follow the instructions given to you on your hospital discharge instructions for more information regarding your home medications.      IN-HOME PHYSICAL THERAPY & OUTPATIENT PHYSICAL THERAPY  Remember to get up and walk around your home every hour to 90 minutes to prevent blood clots and help with your recovery. This is an ACTIVE recovery.  In-home physical therapy will start 1-2 days after you get home from the hospital.    The home care agency will call within the first 24-48 hours to set up their first visit.  Please do not call your care team to inquire during this timeframe.  Continue the exercises you were given in the hospital until you have been seen by in-home therapy.  Make sure to provide a phone number with the ability for the home care staff to leave a message if you do not answer your phone.    Outpatient physical therapy following knee replacement surgery should begin 2-3 weeks after surgery if you and your physical therapist feel that you need it.  You should call to schedule this appointment ASAP if not already scheduled before surgery.  Waiting until you are ready for outpatient physical therapy will cause a delay in your care.  You may choose any outpatient physical therapy location.  Call the office for an order if needed.  Range of motion: Your physical therapist will work with you on regaining your range of motion after surgery. By 4 weeks after surgery, you should be able to bend to at least 100 degrees if not more.     NEVER place a pillow or blanket under your knee. When you are in bed or sitting on a couch, keep your knee COMPLETELY STRAIGHT. This is best done by placing a pillow or blanket under your calf or heel to lift your knee off of the mattress or couch.     EMERGENCIES - WHEN TO CONTACT THE SURGEON'S OFFICE IMMEDIATELY  Fever >101 with  chills that has been present for at least 48 hours.   Excessive bleeding from incision that will not slow down. A small amount of drainage is normal and expected.  Once pressure is applied and the area is covered, do not continue to check the area regularly.  This will remove pressure and bleeding will continue.  Leave in place for 4-6 hours.  Signs of infection of incision-excessive drainage that is soaking through your dressing (especially if it is pus-like), redness that is spreading out from the edges of your incision, or increased warmth around the area.  Excruciating pain for which the pain medication, taken as instructed, is not helping.  Severe calf pain.  Go directly to the emergency room or call 911, if you are experiencing chest pain or difficulty breathing.    ICE & COLD THERAPY INSTRUCTIONS    To assist with pain control and post-op swelling, you should be using ice regularly throughout recovery, especially for the first 6 weeks, regardless of the cold therapy method you use.      Always make sure there is a layer of protection between the cold pad and your skin.    If you are using ICE PACKS or GEL PACKS, you will need to alternate 20 minutes on, 20 minutes off twice per hour.    If you are using an ICE MACHINE, please follow the provided ice machine instructions.  These devices differ from ice or ice packs whereas the mechanism circulates water through tubing and a pad to provide longer periods of cold therapy to the desired site.  You can use your cold devices around the clock for optimal comfort.  We recommend using cold therapy after working with therapy or completing exercises on your own.  There is no set schedule in which you must follow while using cold therapy.  Below are a few points to remember when using a cold therapy device:    You do not need to need to use the 20 on, 20 off method.  Detach the pad from the cooler and ambulate at least once every hour.  You can check your skin under the  pad at this time.  You may wear the cold therapy device during periods of sleep including overnight.  If you wake up during the night, you can check the skin at this time.  You do not need to wake up specifically to perform skin checks.  Empty the cooler and pad when device is not in use.  Follow 's instructions for cleaning your cold therapy device.      DISCHARGE MEDICATIONS - Please reference the sample schedule for instructions on how to best schedule medications.  PAIN MEDICATION    ___X_ Tramadol / Oxycodone  Tramadol and Oxycodone have been prescribed for post-operative pain control.    These medications will only be refilled ONCE every 7 days for a period of up to 6 weeks following surgery.  After 6 weeks, you will transition to acetaminophen and over -the- counter anti-inflammatories such as Ibuprofen, Advil or Aleve in conjunction with ICE/COLD THERAPY.   Side effects may be constipation and nausea, vomiting, sleepiness, dizziness, lightheadedness, headache, blurred vision, dry mouth sweating, itching (if you have itching, over-the -counter Benadryl can be used as needed).  You may NOT operate a motor vehicle while taking these medications or have been cleared by your care team.     ___X_ Acetaminophen (Tylenol)  Acetaminophen has been prescribed as an adjunct for pain control. Take two 500 mg tablets every 8 hours for 4 weeks. You will not receive a refill on this medication.  Do not exceed 4000mg of acetaminophen within a 24 hour period.  Side effects may include nausea, heartburn, drowsiness, and headache.    _______ Meloxicam (Mobic)-Meloxicam has been prescribed as an adjunct anti-inflammatory to assist in pain control.    Take one 15mg tablet once daily for 4 weeks.  You will not receive refills on this medication.   Side effects may include nausea.  May not be prescribed if you are on a more potent blood thinner than aspirin or have chronic kidney disease.    BLOOD THINNER    ___X_ Blood  Thinner   Aspirin, eliquis or xarelto has been prescribed as a blood thinner to prevent blood clots in your leg or lungs. Take as prescribed on the bottle for 4 weeks. You will not receive a refill on this medication.  Do not take this medication if you are on another blood thinner.  Do not take any additional anti-inflammatory medications while taking Aspirin or another blood thinning medication.  Examples may include, Meloxicam, Celebrex, Ibuprofen, Motrin, Aleve, or Advil.     ANTI NAUSEA    ___X_ Pantoprazole (Protonix)  Pantoprazole has been prescribed to help with nausea and protect your stomach while taking pain medication. Take one 40 mg tablet once daily for 4 weeks. You will not receive a refill on this medication.    ___X_ Zofran (Ondansetron)  Zofran has been prescribed to help with nausea and protect your stomach while taking pain medication. Take one 4 mg tablet every eight hours as needed for nausea. Refills will be provided as necessary.      STOOL SOFTENERS    ___X_ Colace (Docusate Sodium) and Senna (Sennoside)  Post-operative constipation can result due to a combination of inactivity, anesthesia and pain medication. To help prevent this, you should increase your water and fiber intake. Physical activity such as walking will also help stimulate the bowels.   Colace has been prescribed to help with constipation while on Oxycodone and Tramadol. Take one 100 mg tablet twice daily for 4 weeks. No refills needed.  Senna has been prescribed in combination with Colace to help with constipation while taking your pain medications.  Take one 8.6mg Tablet once daily.    ANTIBIOTICS    ___X_ Cefadroxil or Doxycycline   Post-operative prevention of infection   Side effects can be upset stomach or diarrhea  Take with food. Do not take on an empty stomach  May not be prescribed       You will not receive refills on the following medications:  Acetaminophen (Tylenol)  Senna  Colace  Pantoprazole  Blood Thinner  (Aspirin or Eliquis)  Antibiotic (Cefadroxil or Doxycycline)  Pain Medication Refills - [phone] or MyChart - Monday through Friday 7am-1pm    Medication refills will be sent upon receipt of your request during the times listed above. Due to the high call volume, you will not receive a call confirming prescription refills; please do not call multiple times.  Prescription refills may take a few hours to process, you may follow up with your pharmacy for pickup availability.    SAMPLE              The times below are an example of how to organize medications to optimize pain control  Your actual medication schedule may vary based on your last dose taken IN THE HOSPITAL      Time 3:00am 6:00am 9:00am 12:00pm 3:00pm 6:00pm 9:00pm 12:00am   Medications Tramadol Acetaminophen (Tylenol)   Oxycodone  Senna   Blood Thinner  Colace  Pantoprazole  Tramadol  Antibiotic  Oxycodone Tramadol  Acetaminophen (Tylenol)  Oxycodone     Blood Thinner  Colace  Tramadol  Antibiotic  Acetaminophen (Tylenol)   Oxycodone            You may begin to wean off the pain medication as your pain remains controlled with increased activity.  The schedules provided are meant to serve as an example.  You may wean off based on your pain control.  Please note that pain medications are not filled beyond 6 weeks after surgery.              The times below are an example of how to WEAN OFF medications WHILE CONTINUING TO OPTIMIZE PAIN CONTROL.  Your actual medication schedule may vary based on your last dose taken.  Time 12:00am 4:00am 8:00am 12:00pm 4:00pm 8:00pm   Med Tramadol Oxycodone   Tramadol Oxycodone Tramadol Oxycodone     Time 12:00am 6:00am 12:00pm 6:00pm   Med Tramadol Oxycodone   Tramadol Oxycodone     Time 12:00am 8:00am 4:00pm   Med Tramadol Oxycodone   Tramadol     Time 12:00am 12:00pm   Med Tramadol Tramadol               Patient Education: Knee Immobilizer   The purpose of wearing the knee immobilizer is to stabilize the knee joint to  prevent buckling in the presence of quad (thigh muscle) weakness.  The brace will help prevent falls and support the knee during ambulation and stairs.  Please read and follow the instructions carefully to maintain your safety.    Be sure to walk every 1-2 hours while awake at home.   You may walk without the brace:  until cleared by your home physical therapist (no sooner than 6/23/2024 at 1:00 PM.)  IF STILL EXPERIENCING muscle weakness/buckling after the instructed use time, call your surgeon. Keep using the brace until you meet with physical therapist or surgeon.       Why is an immobilizer needed?  Anesthesia (spinal or nerve block) may cause quad weakness that can last for several hours causing knee buckling or knee hyperextension when bearing weight on the leg.  Some patients metabolize medication more slowly than others which may lead to longer lasting anesthesia and prolonged quad weakness.   When should I wear the immobilizer?  Any time you are up walking/completing stairs/standing up/ weight bearing  Wear the immobilizer for the period of time listed above   Remove the brace during periods of rest and personal hygiene  How do I put on the immobilizer?   The larger end of the brace should be at the top of the leg with the smaller end at the ankle  Position the brace with the black fabric metal liners along the inner and outer sides of the knee, the hard lining is also touching back of knee   The opening on the front of the immobilizer should surround the knee joint   Securely Velcro all straps, ensuring they are tight enough to keep the brace in place on the leg  Ensure the immobilizer is not causing numbness/tingling/limited blood flow to the foot/ lower leg  Safety Considerations  You must continue to use your prescribed walker, in addition to the immobilizer, when up and moving  Make sure the immobilizer is applied correctly before getting up to move  Make sure you stand up/ sit down with walker in  front of you while wearing the brace

## 2024-06-21 NOTE — PROGRESS NOTES
Physical Therapy    Physical Therapy Evaluation & Treatment    Patient Name: Kim Yi  MRN: [id number]  Today's Date: 6/21/2024   Time Calculation  Start Time: 1713  Stop Time: 1800  Time Calculation (min): 47 min    Assessment/Plan   PT Assessment  PT Assessment Results: Decreased strength, Decreased range of motion, Decreased endurance, Decreased mobility, Impaired sensation, Obesity, Orthopedic restrictions, Pain, Impaired balance  Rehab Prognosis: Excellent  Evaluation/Treatment Tolerance: Patient tolerated treatment well  Strengths: Ability to acquire knowledge, Attitude of self, Capable of completing ADLs semi/independent, Housing layout, Insight into problems, Rehab experience, Support of Caregivers (Pt. reported performing exercises regularly prior to surgery.)  End of Session Communication: Bedside nurse  Assessment Comment: Pt. is 59 y.o. Female s/p L TKA. Reviewed HEP and TKA precautions. Pt. verbalized understanding. Pt. performed transfers with correct technique and CGA. Pt. demo good bed mobility and Pt. ambulated to and from hospital bathroom with min cueing. Pt. demo weak L quad during ambulation but no LOB or knee buckling this therapy session. Recommend skilled PT to progress rehab goals.  End of Session Patient Position: Bed, 3 rail up (SCD on, Pt. preparing to eat dinner, needs within reach, Ice on L knee.)   IP OR SWING BED PT PLAN  Inpatient or Swing Bed: Inpatient  PT Plan  Treatment/Interventions: Bed mobility, Transfer training, Gait training, Stair training, Balance training, Neuromuscular re-education, Strengthening, Endurance training, Range of motion, Therapeutic activity, Therapeutic exercise, Home exercise program, Positioning  PT Plan: Ongoing PT  PT Frequency: BID  PT Discharge Recommendations: Low intensity level of continued care  Equipment Recommended upon Discharge: Wheeled walker  PT Recommended Transfer Status: Contact guard, Assist x1      Subjective     General Visit  Information:  General  Reason for Referral: L TKA  Referred By: Dr. Glover  Past Medical History Relevant to Rehab: Arthritis, asthma, HLD, HTN, T2DM.  Family/Caregiver Present: Yes (Brother and Brother's spouse present briefly.)  Prior to Session Communication: Bedside nurse  Patient Position Received: Bed, 3 rail up  General Comment: Dressing dry and intact. Pt. ordered Dinner and breakfast.  Home Living:  Home Living  Type of Home: House  Lives With: Adult children  Home Adaptive Equipment: Walker rolling or standard  Home Layout: One level, Full bath main level (Loft upstairs, does not need to use. Bed and bath main level.)  Home Access: Stairs to enter with rails (Reports Railing is unsteady. Pt. showed PT picture of railing. Pt. education regarding technique options for navigating stairs, such as use of Cane if available.)  Entrance Stairs-Rails: Left  Entrance Stairs-Number of Steps: 3  Bathroom Shower/Tub: Walk-in shower (Small step to enter walk in)  Bathroom Toilet: Standard  Bathroom Equipment: Grab bars in shower, Grab bars around toilet (Reports suction cup bars. Pt. advised on safety of suction cup grab bars.)  Home Living Comments: Loft upstairs, does not need to use.  Prior Level of Function:  Prior Function Per Pt/Caregiver Report  Level of [place]: Independent with ADLs and functional transfers, Independent with homemaking with ambulation  ADL Assistance: Independent  Homemaking Assistance: Independent  Ambulatory Assistance: Independent  Vocational: Full time employment (Pt. 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.)  Precautions:  Precautions  LE Weight Bearing Status: Weight Bearing as Tolerated  Medical Precautions: Fall precautions  Post-Surgical Precautions: Left total knee precautions    Objective   Pain:  Pain Assessment  Pain Assessment: 0-10  0-10 (Numeric) Pain Score: 0 - No pain  Pain Type: Surgical pain  Pain Location: Knee  Pain Orientation:  "Left  Cognition:  Cognition  Overall Cognitive Status: Within Functional Limits  Attention: Within Functional Limits  Memory: Within Funtional Limits  Problem Solving: Within Functional Limits  Safety/Judgement: Within Functional Limits  Insight: Within function limits  Impulsive: Within functional limits    General Assessments:  Activity Tolerance  Endurance: Endurance does not limit participation in activity    Sensation  Light Touch: Partial deficits in the LLE (L3-L4)  Proprioception: No apparent deficits       Perception  Inattention/Neglect: Appears intact  Initiation: Appears intact  Motor Planning: Appears intact      Coordination  Movements are Fluid and Coordinated: Yes    Postural Control  Postural Control: Within Functional Limits    Static Sitting Balance  Static Sitting-Balance Support: Bilateral upper extremity supported, Feet supported  Static Sitting-Level of Assistance: Close supervision  Dynamic Sitting Balance  Dynamic Sitting-Balance Support: Bilateral upper extremity supported, Feet supported  Dynamic Sitting-Comments: Close supervision.    Static Standing Balance  Static Standing-Balance Support: Bilateral upper extremity supported  Static Standing-Level of Assistance: Contact guard  Dynamic Standing Balance  Dynamic Standing-Balance Support: Bilateral upper extremity supported  Dynamic Standing-Balance: Lateral lean  Dynamic Standing-Comments: CGA. (Pt. reports L knee \"feels weird\". Pt. denies dizziness/lighthededness.)  Functional Assessments:  ADL  ADL's Addressed: Yes  LE Dressing Deficit: Don/doff R sock, Don/doff L sock  Toileting Assistance with Device: Stand by  Toileting Deficit: Setup, Supervison/safety, Use of bedpan/urinal setup (Pt. urinated 475 ml)  Functional Assistance: Stand by (Pt. performing hand hygiene)  Functional Deficit: Setup, Supervision/safety  ADL Comments: Pt. demo stability performing ADLs this session.    Bed Mobility  Bed Mobility: Yes  Bed Mobility 1  Bed " "Mobility 1: Supine to sitting, Sitting to supine, Scooting  Level of Assistance 1: Close supervision  Bed Mobility Comments 1: Pt. demo ability to scoot to adjust position in bed without assistance.    Transfers  Transfer: Yes  Transfer 1  Transfer From 1: Bed to  Transfer to 1: Stand, Toilet, Bed  Technique 1: Sit to stand, Stand to sit  Transfer Device 1: Walker  Transfer Level of Assistance 1: Contact guard, Minimal verbal cues (Verbal cueing for hand placement. Education on surgical leg positioning if needed.)    Ambulation/Gait Training  Ambulation/Gait Training Performed: Yes  Ambulation/Gait Training 1  Surface 1: Level tile  Device 1: Rolling walker  Gait Support Devices: Gait belt  Assistance 1: Contact guard, Minimal verbal cues  Quality of Gait 1: Diminished heel strike, Decreased step length, Antalgic, Soft knee(s) (Step to pattern. Demo L knee snapping into extension with min hyperextension, likely due to decreased L quad strength and residual effects of anesthesia. Pt. educated possible reason for knee \"snapping back\" and to use assistance until quad wakes up.)  Comments/Distance (ft) 1: 25 ft x 2    Stairs  Stairs: No  Extremity/Trunk Assessments:  RUE   RUE : Within Functional Limits  LUE   LUE: Within Functional Limits  RLE   RLE : Within Functional Limits  LLE   LLE : Exceptions to WFL  AROM LLE (degrees)  LLE AROM Comment: Limited due to surgical pain.  Strength LLE  LLE Overall Strength: Greater than or equal to 3/5 as evidenced by functional mobility, Due to pain (Demo decreased L quad strength during functional mobility.)  Treatments:  Therapeutic Exercise  Therapeutic Exercise Performed: Yes  Therapeutic Exercise Activity 1: Ankles pumps 1x15, Glute squeezes 1x10, L Quad set 1x10, L heel slides 1x10, L SAQ 1x10, L SLR 1x10, L hip abduction 1x10.    Outcome Measures:  [facility] Basic Mobility  Turning from your back to your side while in a flat bed without using bedrails: A little  Moving from " lying on your back to sitting on the side of a flat bed without using bedrails: A little  Moving to and from bed to chair (including a wheelchair): A little  Standing up from a chair using your arms (e.g. wheelchair or bedside chair): A little  To walk in hospital room: A little  Climbing 3-5 steps with railing: A lot  Basic Mobility - Total Score: 17    Encounter Problems       Encounter Problems (Active)       Balance       LTG - Patient will maintain balance to allow for safe mobility with least restrictive assistive device  (Progressing)       Start:  06/21/24               Compromised Skin Integrity       LTG - Patient will maintain/improve skin integrity through proper skin care techniques (Progressing)       Start:  06/21/24               Mobility       LTG - Patient will ambulate household distance MOD I with RW  (Progressing)       Start:  06/21/24            LTG - Patient will navigate 4-6 steps with rails/device (Progressing)       Start:  06/21/24            LTG - Patient will demonstrate safe mobility requirements MOD I with RW  (Progressing)       Start:  06/21/24               PT Transfers       LTG - Patient will demonstrate safe transfer techniques MOD I with RW  (Progressing)       Start:  06/21/24               Pain          Safety       LTG - Patient will demonstrate safety requirements appropriate to situation/environment (Progressing)       Start:  06/21/24                   Education Documentation  Handouts, taught by Hasmukh Torres PT at 6/21/2024  6:04 PM.  Learner: Family, Patient  Readiness: Acceptance  Method: Explanation, Demonstration, Handout  Response: Verbalizes Understanding, Demonstrated Understanding    Precautions, taught by Hasmukh Torres PT at 6/21/2024  6:04 PM.  Learner: Family, Patient  Readiness: Acceptance  Method: Explanation, Demonstration, Handout  Response: Verbalizes Understanding, Demonstrated Understanding    Home Exercise Program, taught by Hasmukh Torres PT  at 6/21/2024  6:04 PM.  Learner: Family, Patient  Readiness: Acceptance  Method: Explanation, Demonstration, Handout  Response: Verbalizes Understanding, Demonstrated Understanding    Mobility Training, taught by Hasmukh Torres PT at 6/21/2024  6:04 PM.  Learner: Family, Patient  Readiness: Acceptance  Method: Explanation, Demonstration, Handout  Response: Verbalizes Understanding, Demonstrated Understanding    Education Comments  No comments found.    Hasmukh Torres PT, DPT

## 2024-06-21 NOTE — OP NOTE
LEFT TOTAL KNEE ARTHROPLATY ( Depuy Attune Medial Stabilized ) (L) Operative Note     Date: 2024  OR Location: [facility]    Name: Kim Yi, : 1965, Age: 59 y.o., MRN: [id number], Sex: female    Diagnosis  Pre-op Diagnosis     * Primary osteoarthritis of left knee [M17.12] Post-op Diagnosis     * Primary osteoarthritis of left knee [M17.12]     Procedures  LEFT TOTAL KNEE ARTHROPLATY ( Depuy Attune Medial Stabilized )  74708 - ID ARTHRP KNE CONDYLE&PLATU MEDIAL&LAT COMPARTMENTS      Surgeons      * Gus Glover - Primary    Resident/Fellow/Other Assistant:  Surgeons and Role:     * Sarah Peña PA-C - Resident - Assisting    Procedure Summary  Anesthesia: Spinal  ASA: III  Anesthesia Staff: Anesthesiologist: Solomon Cooper DO  CRNA: MILADIS Fox-CRNA  C-AA: ENRRIQUE Cruz; ENRRIQUE Osorio  Anesthesia Break User: Sanjay Vazquez MD  Estimated Blood Loss: 25mL  Intra-op Medications:   Administrations occurring from 1130 to 1350 on 24:   Medication Name Total Dose   ceFAZolin in dextrose (iso-os) (Ancef) IVPB 2 g 2 g   fentaNYL PF (Sublimaze) injection 50 mcg 50 mcg   midazolam (Versed) injection 2 mg 2 mg   lactated Ringer's infusion 533.33 mL              Anesthesia Record               Intraprocedure I/O Totals          Intake    Tranexamic Acid 0.00 mL    The total shown is the total volume documented since Anesthesia Start was filed.    lactated Ringer's infusion 1700.00 mL    Total Intake 1700 mL       Output    Est. Blood Loss 50 mL    Total Output 50 mL       Net    Net Volume 1650 mL          Specimen: No specimens collected     Staff:   Circulator: Kajal Perkinsub Person: Diana  Scrub Person: Shannan         Drains and/or Catheters: * None in log *    Tourniquet Times:     Total Tourniquet Time Documented:  Leg (Left) - 119 minutes  Total: Leg (Left) - 119 minutes      Implants:  Implants       Type Name Action Serial No.      Joint CEMENT, BONE SIMPLEX P  W/TOBRA - [id number] Implanted      Joint CEMENT, BONE SIMPLEX P W/TOBRA - [id number] Implanted      Joint Knee DOME, PATELLA, MEDIALIZED, 32MM - [id number] Implanted      Joint Knee TIBAL BASE ATTUNE FB, SZ 3 LETTY - [id number] Implanted       SIZE 5, ATTUNE FEMORAL CRUCIATE RETAINING, NARROW, LEFT, CEMENTED Implanted       8.0MM ATTUNE CRUCIATE RETAINING FIXED BEARING TIBIAL INSERT, AOX, SIZE 5, LEFT, MEDIAL STABILIZED Implanted               Findings: DJD Left knee     Implants:   Depuy Attune CR Femur Size 5N  Depuy Attune Fixed Bearing Tibia Size 3  Depuy Polyethylene Patella Size 32  Polyethylene insert Size 5, 8 mm Thickness       Anesthesia: Spinal      Tourniquet Time: 119 min       Medications: Ancef and TXA       Position: Supine       Blood loss: 25 cc       Complications: None       Specimen: None       INDICATIONS:   The patient is a 59 y.o. year old female with a longstanding history of knee pain secondary to end stage osteoarthritis. The patient failed non-operative treatment to include anti-inflammatories, rest, and activity modification. Radiographs showed bone-on-bone osteoarthritis.  After discussing the options, risks, benefits, and possible complications, the patient elected to undergo a total knee arthroplasty. The patient understood the risks including but are not limited to infection, fracture, loosening of the components, failure of the implants, loss of range of motion, stiffness, chronic pain, opioid addiction, disability, wear, loosening, reaction to implanted materials, DVT, PE, MI, stroke, loss of limb, loss of life, or potential need for further surgery. The patient understands these risks and has elected to proceed.       TECHNIQUE:   The patient was properly identified in the holding area using name, medical record number, and date of birth. Informed consent was then signed and the operative extremity was marked. The patient had an opportunity to ask questions which were answered. The  procedure, risks, benefits and alternatives were reviewed. Next, the patient was brought back to the operating room. Spinal anesthesia was initiated without difficulty. The patient was placed in the supine position on the table. All richmond prominences were well padded. A sequential compression device was applied to the contralateral extremity. The operative lower extremity was prepped and draped in usual sterile fashion. The patient received ancef and TXA prior to incision.   A preprocedural timeout was then performed once again confirming the patient's identity, procedure, and laterality. In addition, allergy status and required equipment were reviewed. Three independent members of the operating room team agreed on the above-mentioned parameters. The pre-operative marking was still visible.       The leg was then exsanguinated, tourniquet inflated, incision made in the midline.  Soft tissue dissected down to the retinaculum was performed. A medial parapatellar arthrotomy was performed.  Partial medial synovectomy was performed. A medial release was performed.  Fat pad was resected laterally. The anterior medial and lateral meniscus were released. ACL was transected.       Using the step drill, the femoral canal was opened.  We then used bulb irrigation to irrigate the intramedullary canal.  The intramedullary mauricio was introduced into the femur.  The distal femoral cut angle was set at 5 degrees. Distal resection block was pinned in place.  Collateral retractors were placed and the distal femur was resected.       Attention was then turned to the patella.  The patella measured 20 mm centrally. It was then cut to 15 mm. The patella was checked to ensure it was symmetric. The patella was then sized and prepared using the guide. A size 32 mm was found to be appropriate.  I placed a patella protector and flexed up the knee.       We then turned our attention to the proximal tibia. Medial, lateral and posterior retractors  were placed. The extramedullary guide was set for 7 degrees of posterior slope and neutral varus/valgus angulation.  The block was pinned in place and the proximal tibia was resected.  The cut was verified to ensure that it was flat and not in varus or valgus.       The knee was brought into extension and a medial and lateral meniscectomy were performed.       We then turned our attention back to the femur.  The sizing guide was introduced and pinned in place.  The femur was sized to a size 5.  Appropriate pins were placed.  The sizing block was then removed and the 4-in-1 cutting block was introduced and pinned in place.  The anterior, posterior and chamfer cuts were then made.  The cutting block was removed.       Attention was then turned to the posterior osteophytes. Using a curved 3/4 inch osteotome, the osteophytes were removed. We then used a cob to elevate the posterior capsule from the femur.       The tibia was subluxed and a trial base plate was used to size the tibia. A size 3 was appropriate. This was secured in place with provisional pins. The femoral trail was then impacted. A trial polyethylene was inserted. The PCL was completely released.    The knee was able to be flexed to 120 degrees. The knee was stable to both varus and valgus stress through full range of motion, including mid flexion. The knee was checked again and found to be symmetric. The patella tracked well. The trial polyethylene was removed, and the femoral lug holes were drilled. The final tibial preparation was performed. All trials were removed. The knee was copiously irrigated and dried.       The cement was mixed and the final implants were cemented into place starting with the tibia. Excess cement was removed. A trial polyethylene was placed and the knee was held in full extension to allow the cement to harden.     Finally, the trial polyethylene was replaced with the definitive insert. A 8 mm thickness was appropriate. The knee  was again brought through a full range of motion. It was stable to both varus and valgus stress through a full range of motion. The patella tracked centrally. The joint mix was injected into the local soft tissues and the wound was irrigated with copious pulse lavage.       The knee was again copiously irrigated. The retinaculum was approximated with #2 ethibond and then closed with #1 Stratafix. Deep subcutaneous and dermis closed with 0 and 2-0 Vicryl in simple interrupted fashion. Skin closure with Monocryl and skin glue.       The patient tolerated the procedure well with no complications and was taken from the operating room in stable condition. All sponge and needle counts were correct at the end of the case.       I attest that I was present and scrubbed for all critical parts of the procedure.     Post Operative Plan:  Weight bearing as tolerated  Aspirin 81 mg BID for 4 weeks for VTE prophylaxis  Post operative antibiotics in PACU  Disposition: Overnight observation     The patient's surgery was assisted by Sarah HAMEED, due to lack of availability of a qualified resident to assist with the surgery. Sarah HAMEED was present for the essential parts of the procedure. She acted as first assistant and assisted with preparing the leg, draping the leg, exposing the knee, retracting and manipulating the leg during surgery, facilitating safe performance of the procedure, and closure of the wound.     Complications:  None; patient tolerated the procedure well.    Disposition: PACU - hemodynamically stable.  Condition: stable         Additional Details: NA    Attending Attestation: I was present and scrubbed for the key portions of the procedure.    Gus Glover  Phone Number: [phone]

## 2024-06-21 NOTE — NURSING NOTE
"Blood pressure 136/75, pulse 78, temperature 36 °C (96.8 °F), resp. rate 14, height 1.52 m (4' 11.84\"), weight 92.2 kg (203 lb 4.2 oz), SpO2 99%.   Patient got the floor, vital is stable. Alert by 3. Oriented to room. Call light within reach. Continue monitor.    "

## 2024-06-21 NOTE — BRIEF OP NOTE
Date: 2024  OR Location: [facility]    Name: Kim Yi, : 1965, Age: 59 y.o., MRN: [id number], Sex: female    Diagnosis  Pre-op Diagnosis     * Primary osteoarthritis of left knee [M17.12] Post-op Diagnosis     * Primary osteoarthritis of left knee [M17.12]     Procedures  LEFT TOTAL KNEE ARTHROPLATY ( Depuy Attune Medial Stabilized )  73088 - [place] ARTHRP KNE CONDYLE&PLATU MEDIAL&LAT COMPARTMENTS      Surgeons      * Gus Glover - Primary    Resident/Fellow/Other Assistant:  Surgeons and Role:     * Sarah Peña PA-C - Resident - Assisting    Procedure Summary  Anesthesia: Spinal  ASA: III  Anesthesia Staff: Anesthesiologist: Solomon Cooper DO  CRNA: MILADIS Fox-FRANCHESCA  C-AA: ENRRIQUE Cruz; ENRRIQUE Osorio  Anesthesia Break User: Sanjay Vazquez MD  Estimated Blood Loss: 25mL  Intra-op Medications:   Administrations occurring from 1130 to 1350 on 24:   Medication Name Total Dose   lactated Ringer's infusion 533.33 mL   ceFAZolin in dextrose (iso-os) (Ancef) IVPB 2 g 2 g   fentaNYL PF (Sublimaze) injection 50 mcg 50 mcg   midazolam (Versed) injection 2 mg 2 mg              Anesthesia Record               Intraprocedure I/O Totals          Intake    Tranexamic Acid 0.00 mL    The total shown is the total volume documented since Anesthesia Start was filed.    lactated Ringer's infusion 1000.00 mL    Total Intake 1000 mL          Specimen: No specimens collected     Staff:   Circulator: Kajal  Scrub Person: Diana  Scrub Person: Shannan          Findings: Knee DJD    Complications:  None; patient tolerated the procedure well.     Disposition: PACU - hemodynamically stable.  Condition: stable  Specimens Collected: No specimens collected

## 2024-06-21 NOTE — PERIOPERATIVE NURSING NOTE
"Bilat calves measure 14-14.5\"  Medium regular knee high EMA placed right leg.  " [place] Pregnancy And Lactation Text: This medication is not safe during pregnancy. It is unknown if this medication is excreted in breast milk.

## 2024-06-21 NOTE — ANESTHESIA PROCEDURE NOTES
Peripheral Block    Patient location during procedure: pre-op  Start time: 6/21/2024 11:58 AM  End time: 6/21/2024 12:00 PM  Reason for block: at surgeon's request  Staffing  Performed: attending and CRNA   Authorized by: Solomon Cooper DO    Performed by: Solomon Cooper DO  Preanesthetic Checklist  Completed: patient identified, IV checked, site marked, risks and benefits discussed, surgical consent, monitors and equipment checked, pre-op evaluation and timeout performed   Timeout performed at: 6/21/2024 11:54 AM  Peripheral Block  Patient position: laying flat  Prep: ChloraPrep  Patient monitoring: heart rate, cardiac monitor and continuous pulse ox  Block type: adductor canal  Laterality: left  Injection technique: single-shot  Guidance: ultrasound guided  Local infiltration: bupivicaine  Infiltration strength: 0.5 %  Dose: 22 mL  Needle  Needle type: short-bevel   Needle gauge: 22 G  Needle length: 10 cm  Needle localization: ultrasound guidance     image stored in chart  Needle insertion depth: 5 cm  Assessment  Injection assessment: negative aspiration for heme, no paresthesia on injection, incremental injection and local visualized surrounding nerve on ultrasound  Paresthesia pain: none  Heart rate change: no  Slow fractionated injection: yes

## 2024-06-22 ENCOUNTER — DOCUMENTATION (OUTPATIENT)
Dept: HOME HEALTH SERVICES | Facility: HOME HEALTH | Age: 59
End: 2024-06-22
Payer: COMMERCIAL

## 2024-06-22 VITALS
DIASTOLIC BLOOD PRESSURE: 75 MMHG | OXYGEN SATURATION: 100 % | WEIGHT: 203.26 LBS | RESPIRATION RATE: 16 BRPM | TEMPERATURE: 96.5 F | HEART RATE: 66 BPM | HEIGHT: 60 IN | SYSTOLIC BLOOD PRESSURE: 134 MMHG | BODY MASS INDEX: 39.91 KG/M2

## 2024-06-22 LAB
ANION GAP SERPL CALC-SCNC: 16 MMOL/L (ref 10–20)
BUN SERPL-MCNC: 17 MG/DL (ref 6–23)
CALCIUM SERPL-MCNC: 9.3 MG/DL (ref 8.6–10.3)
CHLORIDE SERPL-SCNC: 102 MMOL/L (ref 98–107)
CO2 SERPL-SCNC: 21 MMOL/L (ref 21–32)
CREAT SERPL-MCNC: 0.96 MG/DL (ref 0.5–1.05)
EGFRCR SERPLBLD CKD-EPI 2021: 69 ML/MIN/1.73M*2
ERYTHROCYTE [DISTWIDTH] IN BLOOD BY AUTOMATED COUNT: 13.6 % (ref 11.5–14.5)
GLUCOSE BLD MANUAL STRIP-MCNC: 175 MG/DL (ref 74–99)
GLUCOSE BLD MANUAL STRIP-MCNC: 194 MG/DL (ref 74–99)
GLUCOSE SERPL-MCNC: 207 MG/DL (ref 74–99)
HCT VFR BLD AUTO: 34.7 % (ref 36–46)
HGB BLD-MCNC: 11.4 G/DL (ref 12–16)
MCH RBC QN AUTO: 27 PG (ref 26–34)
MCHC RBC AUTO-ENTMCNC: 32.9 G/DL (ref 32–36)
MCV RBC AUTO: 82 FL (ref 80–100)
NRBC BLD-RTO: ABNORMAL /100{WBCS}
PLATELET # BLD AUTO: 239 X10*3/UL (ref 150–450)
POTASSIUM SERPL-SCNC: 4.1 MMOL/L (ref 3.5–5.3)
RBC # BLD AUTO: 4.23 X10*6/UL (ref 4–5.2)
SODIUM SERPL-SCNC: 135 MMOL/L (ref 136–145)
WBC # BLD AUTO: 14.2 X10*3/UL (ref 4.4–11.3)

## 2024-06-22 PROCEDURE — 7100000011 HC EXTENDED STAY RECOVERY HOURLY - NURSING UNIT

## 2024-06-22 PROCEDURE — 82947 ASSAY GLUCOSE BLOOD QUANT: CPT

## 2024-06-22 PROCEDURE — 2500000002 HC RX 250 W HCPCS SELF ADMINISTERED DRUGS (ALT 637 FOR MEDICARE OP, ALT 636 FOR OP/ED): Performed by: PHYSICIAN ASSISTANT

## 2024-06-22 PROCEDURE — 2500000001 HC RX 250 WO HCPCS SELF ADMINISTERED DRUGS (ALT 637 FOR MEDICARE OP): Performed by: STUDENT IN AN ORGANIZED HEALTH CARE EDUCATION/TRAINING PROGRAM

## 2024-06-22 PROCEDURE — 36415 COLL VENOUS BLD VENIPUNCTURE: CPT

## 2024-06-22 PROCEDURE — 2500000004 HC RX 250 GENERAL PHARMACY W/ HCPCS (ALT 636 FOR OP/ED): Mod: JZ

## 2024-06-22 PROCEDURE — 80048 BASIC METABOLIC PNL TOTAL CA: CPT

## 2024-06-22 PROCEDURE — 2500000001 HC RX 250 WO HCPCS SELF ADMINISTERED DRUGS (ALT 637 FOR MEDICARE OP)

## 2024-06-22 PROCEDURE — 2500000001 HC RX 250 WO HCPCS SELF ADMINISTERED DRUGS (ALT 637 FOR MEDICARE OP): Performed by: PHYSICIAN ASSISTANT

## 2024-06-22 PROCEDURE — 97530 THERAPEUTIC ACTIVITIES: CPT | Mod: GP

## 2024-06-22 PROCEDURE — 85027 COMPLETE CBC AUTOMATED: CPT

## 2024-06-22 PROCEDURE — 97116 GAIT TRAINING THERAPY: CPT | Mod: GP

## 2024-06-22 PROCEDURE — 97110 THERAPEUTIC EXERCISES: CPT | Mod: GP

## 2024-06-22 RX ORDER — OXYCODONE HYDROCHLORIDE 5 MG/1
10 TABLET ORAL EVERY 4 HOURS PRN
Status: DISCONTINUED | OUTPATIENT
Start: 2024-06-22 | End: 2024-06-22 | Stop reason: HOSPADM

## 2024-06-22 RX ORDER — OXYCODONE HYDROCHLORIDE 5 MG/1
5 TABLET ORAL EVERY 4 HOURS PRN
Status: DISCONTINUED | OUTPATIENT
Start: 2024-06-22 | End: 2024-06-22 | Stop reason: HOSPADM

## 2024-06-22 RX ADMIN — POLYETHYLENE GLYCOL 3350 17 G: 17 POWDER, FOR SOLUTION ORAL at 08:39

## 2024-06-22 RX ADMIN — ACETAMINOPHEN 975 MG: 325 TABLET ORAL at 00:20

## 2024-06-22 RX ADMIN — OXYCODONE 10 MG: 5 TABLET ORAL at 00:26

## 2024-06-22 RX ADMIN — OXYCODONE 10 MG: 5 TABLET ORAL at 13:13

## 2024-06-22 RX ADMIN — FENOFIBRATE 160 MG: 160 TABLET ORAL at 08:38

## 2024-06-22 RX ADMIN — INSULIN LISPRO 3 UNITS: 100 INJECTION, SOLUTION INTRAVENOUS; SUBCUTANEOUS at 08:33

## 2024-06-22 RX ADMIN — PANTOPRAZOLE SODIUM 40 MG: 40 TABLET, DELAYED RELEASE ORAL at 06:06

## 2024-06-22 RX ADMIN — KETOROLAC TROMETHAMINE 15 MG: 15 INJECTION, SOLUTION INTRAMUSCULAR; INTRAVENOUS at 04:39

## 2024-06-22 RX ADMIN — ASPIRIN 81 MG: 81 TABLET, COATED ORAL at 08:38

## 2024-06-22 RX ADMIN — KETOROLAC TROMETHAMINE 15 MG: 15 INJECTION, SOLUTION INTRAMUSCULAR; INTRAVENOUS at 12:16

## 2024-06-22 RX ADMIN — INSULIN LISPRO 3 UNITS: 100 INJECTION, SOLUTION INTRAVENOUS; SUBCUTANEOUS at 12:13

## 2024-06-22 RX ADMIN — CEFAZOLIN SODIUM 2 G: 2 INJECTION, SOLUTION INTRAVENOUS at 04:39

## 2024-06-22 RX ADMIN — DOCUSATE SODIUM 100 MG: 100 CAPSULE, LIQUID FILLED ORAL at 08:38

## 2024-06-22 RX ADMIN — LOSARTAN POTASSIUM 100 MG: 100 TABLET, FILM COATED ORAL at 08:38

## 2024-06-22 RX ADMIN — ACETAMINOPHEN 975 MG: 325 TABLET ORAL at 08:38

## 2024-06-22 RX ADMIN — OXYCODONE 10 MG: 5 TABLET ORAL at 08:40

## 2024-06-22 RX ADMIN — METFORMIN HYDROCHLORIDE 500 MG: 500 TABLET ORAL at 08:38

## 2024-06-22 RX ADMIN — GLIMEPIRIDE 4 MG: 2 TABLET ORAL at 08:38

## 2024-06-22 ASSESSMENT — PAIN SCALES - WONG BAKER
WONGBAKER_NUMERICALRESPONSE: HURTS EVEN MORE
WONGBAKER_NUMERICALRESPONSE: HURTS EVEN MORE
WONGBAKER_NUMERICALRESPONSE: HURTS LITTLE MORE

## 2024-06-22 ASSESSMENT — PAIN SCALES - GENERAL
PAINLEVEL_OUTOF10: 7
PAINLEVEL_OUTOF10: 7
PAINLEVEL_OUTOF10: 6
PAINLEVEL_OUTOF10: 7
PAINLEVEL_OUTOF10: 3
PAINLEVEL_OUTOF10: 7
PAINLEVEL_OUTOF10: 3
PAINLEVEL_OUTOF10: 0 - NO PAIN
PAINLEVEL_OUTOF10: 3
PAINLEVEL_OUTOF10: 3

## 2024-06-22 ASSESSMENT — PAIN - FUNCTIONAL ASSESSMENT
PAIN_FUNCTIONAL_ASSESSMENT: 0-10

## 2024-06-22 ASSESSMENT — PAIN DESCRIPTION - DESCRIPTORS
DESCRIPTORS: ACHING

## 2024-06-22 ASSESSMENT — COGNITIVE AND FUNCTIONAL STATUS - GENERAL
DAILY ACTIVITIY SCORE: 20
CLIMB 3 TO 5 STEPS WITH RAILING: A LITTLE
HELP NEEDED FOR BATHING: A LITTLE
DRESSING REGULAR UPPER BODY CLOTHING: A LITTLE
DRESSING REGULAR LOWER BODY CLOTHING: A LITTLE
CLIMB 3 TO 5 STEPS WITH RAILING: A LITTLE
MOBILITY SCORE: 23
MOBILITY SCORE: 23
TOILETING: A LITTLE

## 2024-06-22 ASSESSMENT — PAIN SCALES - PAIN ASSESSMENT IN ADVANCED DEMENTIA (PAINAD)
BREATHING: NORMAL
TOTALSCORE: MEDICATION (SEE MAR);COLD APPLIED

## 2024-06-22 NOTE — NURSING NOTE
Patient assessment completed at this time c/o 9/10 left knee pain requested pain meds, did not want dilaudid at thias time. Pt wanted to try flexeril, c/o nausea was given prn Zofran 4mg IVP as ordered by Dr Pereyra.  was also notified regarding pt's request for flexeril. Pt was educated regarding medications, atb and verbalizes understanding. No further needs noted, call light is within reach.

## 2024-06-22 NOTE — NURSING NOTE
Patient was given scheduled tylenol 975mg by mouth as ordered at this time for 7/10 left knee pain. Call light is within reach.

## 2024-06-22 NOTE — NURSING NOTE
Reviewed medications and discharge instructions with patient at the bedside. Pt verbalized understanding. IV discontinued without incidence. Tip intact and pressure applied. Pt wheeled down via wheelchair and assisted into vehicle.

## 2024-06-22 NOTE — PROGRESS NOTES
Medication Education     Medication education for Kim R Parrotte was provided to the patient  for the following medication(s):    APAP  ASA  Cefadroxil  Docusate  Meloxicam  Ondansetron  Oxycodone  Pantoprazole  Senna         Medication education provided by a Pharmacist:  Dose, frequency, storage How to take and what to do if a dose is missed Proper dose, indication, possible ADRs    Identified potential barriers to education:  None    Method(s) of Education:  Verbal Written materials provided and reviewed    An opportunity to ask questions and receive answers was provided.     Assessment of understanding the patient :  2= meets goals/outcomes    Additional Notes (if applicable): meds 2 beds delivered    Tiffany Molina, LorenaD

## 2024-06-22 NOTE — NURSING NOTE
Spoke with Dr Pereyra regarding changing oxycodone frequency to prn q4hrs. Orders given to change from prn q6hrs to prn q4hrs. Pt will also get scheduled tylenol since she has awaken.

## 2024-06-22 NOTE — CARE PLAN
Problem: Mobility  Goal: LTG - Patient will ambulate household distance MOD I with RW   Outcome: Progressing  Goal: LTG - Patient will demonstrate safe mobility requirements MOD I with RW   Outcome: Progressing     Problem: PT Transfers  Goal: LTG - Patient will demonstrate safe transfer techniques MOD I with RW   Outcome: Progressing     Problem: Balance  Goal: LTG - Patient will maintain balance to allow for safe mobility with least restrictive assistive device   Outcome: Met     Problem: Mobility  Goal: LTG - Patient will navigate 4-6 steps with rails/device  Outcome: Met     Problem: Safety  Goal: LTG - Patient will demonstrate safety requirements appropriate to situation/environment  Outcome: Met     Problem: Pain  Goal: LTG - Patient will manage pain with the appropriate technique/Intervention  Outcome: Met     Problem: Compromised Skin Integrity  Goal: LTG - Patient will maintain/improve skin integrity through proper skin care techniques  Outcome: Met

## 2024-06-22 NOTE — PROGRESS NOTES
Patient seen, chart reviewed.  Was complaining of nausea but Zofran has helped.  Now complaining of 10 out of 10 pain despite receiving oxycodone and Flexeril.  Encouraged patient to take IV Dilaudid for breakthrough pain.  No other acute complaints.  Vital signs are stable.            Antionette Pereyra MD

## 2024-06-22 NOTE — NURSING NOTE
Spoke with Dr Pereyra ok to hold scheduled tylenol pt is sleeping at this time, breathing is regular and unlabored. Call light is within reach.

## 2024-06-22 NOTE — PROGRESS NOTES
Progress Note:    Kim Yi is a 59 y.o. female on day 2 of admission presenting with Primary osteoarthritis of left knee.    Subjective   Ms. Yi reports controlled pain. States she slept on/off.      ROS  Constitutional:  Alert, oriented  HEENT: Denies headache, vision changes, hearing change, loss of taste or smell. Does complain of sore throat (post op)   Neck: Denies swallowing difficulty, swelling, new stiffness/pain  CV: Denies CP, Palpitations, chest wall pain  Resp: No cough, wheeze, dyspnea  Abdomen: Denies abdominal pain, cramping, constipation, diarrhea  : No dysuria, hematuria, discharge  Musculoskeletal: Aches over all joints (not new). Generalized weakness 2/2 pain  Extremities: Swelling of RLE  Neuro: No focal deficits other than mild [place]    Scheduled medications  acetaminophen, 975 mg, oral, q8h  aspirin, 81 mg, oral, BID  atorvastatin, 10 mg, oral, Nightly  docusate sodium, 100 mg, oral, BID  fenofibrate, 160 mg, oral, Daily  glimepiride, 4 mg, oral, Daily with breakfast  insulin lispro, 0-15 Units, subcutaneous, After meals & nightly  ketorolac, 15 mg, intravenous, q6h  losartan, 100 mg, oral, Daily  metFORMIN, 500 mg, oral, BID  pantoprazole, 40 mg, oral, Daily before breakfast  polyethylene glycol, 17 g, oral, Daily      Continuous medications     PRN medications  PRN medications: albuterol, benzocaine-menthol, cyclobenzaprine, dextrose, dextrose, glucagon, glucagon, HYDROmorphone, lubricating eye drops, melatonin, naloxone, ondansetron, oxyCODONE, oxyCODONE, oxygen, simethicone, sodium chloride      Physical Exam  General: No acute distress, alert & oriented  Cardiac: RRR, NL S1 and S2, no murmurs, rubs or gallops  Pulmonary: Lungs clear to auscultation bilaterally, no wheezes, rhales or rhonchi  Abdomen: Soft, non-tender, non-distended  Extremities: No clubbing , cyanosis or edema.     Last Recorded Vitals  Blood pressure 134/75, pulse 66, temperature 35.8 °C (96.5 °F),  "temperature source Temporal, resp. rate 16, height 1.52 m (4' 11.84\"), weight 92.2 kg (203 lb 4.2 oz), SpO2 100%.    Scheduled medications   Medication Dose Route Frequency    acetaminophen  975 mg oral q8h    aspirin  81 mg oral BID    atorvastatin  10 mg oral Nightly    docusate sodium  100 mg oral BID    fenofibrate  160 mg oral Daily    glimepiride  4 mg oral Daily with breakfast    insulin lispro  0-15 Units subcutaneous After meals & nightly    ketorolac  15 mg intravenous q6h    losartan  100 mg oral Daily    metFORMIN  500 mg oral BID    pantoprazole  40 mg oral Daily before breakfast    polyethylene glycol  17 g oral Daily       Relevant Results  Results from last 7 days   Lab Units 06/22/24  0446   WBC AUTO x10*3/uL 14.2*   HEMOGLOBIN g/dL 11.4*   HEMATOCRIT % 34.7*   PLATELETS AUTO x10*3/uL 239      Results from last 7 days   Lab Units 06/22/24  0446   SODIUM mmol/L 135*   POTASSIUM mmol/L 4.1   CHLORIDE mmol/L 102   CO2 mmol/L 21   BUN mg/dL 17   CREATININE mg/dL 0.96   GLUCOSE mg/dL 207*   CALCIUM mg/dL 9.3      Results for orders placed or performed during the hospital encounter of 06/21/24 (from the past 24 hour(s))   POCT GLUCOSE   Result Value Ref Range    POCT Glucose 96 74 - 99 mg/dL   POCT GLUCOSE   Result Value Ref Range    POCT Glucose 131 (H) 74 - 99 mg/dL   POCT GLUCOSE   Result Value Ref Range    POCT Glucose 172 (H) 74 - 99 mg/dL   POCT GLUCOSE   Result Value Ref Range    POCT Glucose 264 (H) 74 - 99 mg/dL   CBC   Result Value Ref Range    WBC 14.2 (H) 4.4 - 11.3 x10*3/uL    nRBC      RBC 4.23 4.00 - 5.20 x10*6/uL    Hemoglobin 11.4 (L) 12.0 - 16.0 g/dL    Hematocrit 34.7 (L) 36.0 - 46.0 %    MCV 82 80 - 100 fL    MCH 27.0 26.0 - 34.0 pg    MCHC 32.9 32.0 - 36.0 g/dL    RDW 13.6 11.5 - 14.5 %    Platelets 239 150 - 450 x10*3/uL   Basic metabolic panel   Result Value Ref Range    Glucose 207 (H) 74 - 99 mg/dL    Sodium 135 (L) 136 - 145 mmol/L    Potassium 4.1 3.5 - 5.3 mmol/L    Chloride " 102 98 - 107 mmol/L    Bicarbonate 21 21 - 32 mmol/L    Anion Gap 16 10 - 20 mmol/L    Urea Nitrogen 17 6 - 23 mg/dL    Creatinine 0.96 0.50 - 1.05 mg/dL    eGFR 69 >60 mL/min/1.73m*2    Calcium 9.3 8.6 - 10.3 mg/dL   POCT GLUCOSE   Result Value Ref Range    POCT Glucose 194 (H) 74 - 99 mg/dL      Assessment/Plan   1) Osteoarthritis Left Knee: POD#1 Left TKR:   Perioperative antibiotics, dressing care, pain meds, PT/OT per Orthopaedics   Patient seen, examined. Notes, labs, vitals reviewed.  The patient is medically cleared for discharge, pending CT clearance    2) DVT Prophylaxis:   Rx: Aspirin 81 mg q 12 hrs x 30 days. Stop date: July 21, 2024     3) Diabetes:   Last HbA1c 4.9  on May 2,2024. Serum Glucose today 207   Rx: Continue Amaryl 4 mg daily   Rx: Continue Ozempic 1 mg weekly   Rx: Continue Metformin 500 mg BID (advised 1000 mg BID) -discuss w/ PCP     4) Hypertension:   Rx:  Continue Losartan 100 mg daily   Rx: Continue Triamterene-Hydrochlorothiazide 37.5/25 mg -daily     5) Hyperlipidemia:   Rx: Continue Zocor 20 mg daily    6) Asthma:    Rx: Continue Albuterol Neb TID   Rx: Continue Albuterol HFA 2 puffs q 4-6 hr prn     7) Disposition:   Plan discharge later today after PT clearance   Medications reviewed.  Medication Reconciliation completed. Orders placed          I spent 45 minutes in the professional and overall care of this patient.      Javan Gao MD

## 2024-06-22 NOTE — NURSING NOTE
Patient was given scheduled toradol 15mg ivp as ordered for mild left knee pain. Pt states pain is at a comfortable level for her. No further needs noted, call light is within reach,.

## 2024-06-22 NOTE — PROGRESS NOTES
Physical Therapy Treatment    Patient Name: Kim Yi  MRN: [id number]  Today's Date: 6/22/2024  Time Calculation  Start Time: 1221  Stop Time: 1301  Time Calculation (min): 40 min    Assessment/Plan   PT Assessment  PT Assessment Results: Decreased strength, Decreased range of motion, Decreased endurance, Decreased mobility, Impaired tone, Orthopedic restrictions, Pain  Rehab Prognosis: Excellent  Evaluation/Treatment Tolerance: Patient tolerated treatment well  Medical Staff Made Aware: Yes  Strengths: Ability to acquire knowledge, Access to adaptive/assistive products, Attitude of self, Capable of completing ADLs semi/independent, Housing layout, Insight into problems, Support of Caregivers, Support and attitude of living partners  End of Session Communication: Bedside nurse, Physician  Assessment Comment: Pt presenting for follow-up of L TKA performed on 6/21/2024. Pt is progressing well towards functional goals this date; participated in bed mobility, functional transfers, ambulation, and stair negotiation at high functional level. Pt continues to demonstrate L quad weakness with 1x bout of knee buckling during ambulation that required CGA from PT to steady. PT donned knee immobilizer, pt able to perform stair negotiation and ambulation without further knee buckling. Surgeon and RN aware. Pt provided with immobilizer handout and pt education for purpose, usage time, skin checks to maintain sensation/circulation. Therapeutic exercises performed with pt in supine; pt reports being compliant with HEP and having a good understanding of the exercises. PT encouraged pt to purchase cane for stair negotiation prior to DC home; pt agreeable. PT recommends DC home with 24/7 supervision and HHPT.    End of Session Patient Position: Up in chair, Alarm off, not on at start of session with BLE elevated and ice to surgical site. Call light left in reach; patient instructed not to get up on own and verbalized  understanding of this. RN aware.    PT Plan  Inpatient/Swing Bed or Outpatient: Inpatient  PT Plan  Treatment/Interventions: Bed mobility, Transfer training, Gait training, Stair training, Neuromuscular re-education, Strengthening, Endurance training, Range of motion, Therapeutic exercise, Therapeutic activity, Home exercise program, Positioning, Orthotic fitting/training  PT Plan: Ongoing PT  PT Frequency: BID  PT Discharge Recommendations: Low intensity level of continued care  Equipment Recommended upon Discharge: Wheeled walker, Straight cane  PT Recommended Transfer Status: Stand by assist, Assistive device  PT - OK to Discharge: Yes      General Visit Information:   PT  Visit  PT Received On: 06/22/24  Response to Previous Treatment: Patient with no complaints from previous session., Compliant with home exercise program  General  Reason for Referral: L TKA  Referred By: Dr. Glover  Past Medical History Relevant to Rehab: Arthritis, asthma, HLD, HTN, T2DM.  Family/Caregiver Present: No  Prior to Session Communication: Bedside nurse  Patient Position Received: Bed, 3 rail up  General Comment: Session cleared by RN. Pt very pleasant and agreeable to PT treatment. Dressing to L knee dry & intact.       Subjective   Precautions:  Precautions  LE Weight Bearing Status: Weight Bearing as Tolerated  Medical Precautions: Fall precautions  Post-Surgical Precautions: Left total knee precautions  Braces Applied: L knee immobilizer donned by this PT    Objective   Pain:  Pain Assessment  Pain Assessment: 0-10  0-10 (Numeric) Pain Score: 6  Pain Type: Surgical pain  Pain Location: Knee  Pain Orientation: Left  Pain Interventions: Medication (See MAR), Cold applied, Ambulation/increased activity, Elevated (RN notified of pt request for pain meds)  Cognition:  Cognition  Overall Cognitive Status: Within Functional Limits  Attention: Within Functional Limits  Memory: Within Funtional Limits  Problem Solving: Within  Functional Limits  Safety/Judgement: Within Functional Limits  Insight: Within function limits  Impulsive: Within functional limits  Coordination:  Movements are Fluid and Coordinated: Yes  Postural Control:  Postural Control  Postural Control: Within Functional Limits  Static Sitting Balance  Static Sitting-Balance Support: Bilateral upper extremity supported, Feet supported  Static Sitting-Level of Assistance: Independent  Static Standing Balance  Static Standing-Balance Support: Bilateral upper extremity supported  Static Standing-Level of Assistance: Distant supervision  Static Standing-Comment/Number of Minutes: with rolling walker  Dynamic Standing Balance  Dynamic Standing-Balance Support: Bilateral upper extremity supported  Dynamic Standing-Comments: CGA <> distant supervision with rolling walker  Extremity/Trunk Assessments:  RUE   RUE : Within Functional Limits  LUE   LUE: Within Functional Limits  RLE   RLE : Within Functional Limits  LLE   LLE : Exceptions to WFL  AROM LLE (degrees)  LLE AROM Comment: L knee flexion to 90 degrees in supine; full knee extension lacking controlled TKE in standing  Strength LLE  LLE Overall Strength: Greater than or equal to 3/5 as evidenced by functional mobility (weak quad during ambulation with 1x bout of knee buckling)  Activity Tolerance:  Activity Tolerance  Endurance: Tolerates 30+ min exercise without fatigue  Treatments:  Therapeutic Exercise  Therapeutic Exercise Performed: Yes B ankle pumps, L quad sets, L gluteal sets, L heel slides, L SAQ, L hip abduction, and L SLR x 10 reps each.      Therapeutic Activity  Therapeutic Activity Performed: Yes  Therapeutic Activity 1: PT provided pt education on icing/elevating surgical limb, HEP, use and proper fit of assistive devices, sequencing and body mechanics during functional mobility, POC; pt verbalized understanding.  Therapeutic Activity 2: PT donned knee immobilizer to pt L knee, providing pt education on purpose,  "usage time, proper fit, and skin checks for sensation/circulation; pt verbalized understanding. Immobilizer handout provided; surgeon and RN aware.    Bed Mobility  Bed Mobility: Yes  Bed Mobility 1  Bed Mobility 1: Supine to sitting  Level of Assistance 1: Distant supervision    Ambulation/Gait Training  Ambulation/Gait Training Performed: Yes  Ambulation/Gait Training 1  Surface 1: Level tile  Device 1: Rolling walker  Gait Support Devices: Gait belt  Assistance 1: Close supervision, Contact guard, Minimal verbal cues (cues for sequencing)  Quality of Gait 1: Diminished heel strike, Decreased step length, Soft knee(s), Knee(s) buckle, Antalgic (knee buckled x1 required contact guard from PT to steady; step to transitioned to reciprocal pattern; decreasec kimberlyn)  Comments/Distance (ft) 1: 150 feet  Ambulation/Gait Training 2  Surface 2: Level tile  Device 2: Rolling walker  Gait Support Devices: Gait belt, Knee immobilizer  Assistance 2: Distant supervision  Quality of Gait 2: Diminished heel strike, Decreased step length, Antalgic (no knee buckle or LOB; decreased kimberlyn; reciprocal pattern)  Comments/Distance (ft) 2: 150 feet  Transfers  Transfer: Yes  Transfer 1  Transfer From 1: Bed to, Stand to  Transfer to 1: Stand, Chair with arms  Technique 1: Sit to stand, Stand to sit  Transfer Device 1: Walker, Gait belt  Transfer Level of Assistance 1: Close supervision, Minimal verbal cues (cues for hand placement)  Trials/Comments 1: x4    Stairs  Stairs: Yes  Stairs  Rails 1: Bilateral  Device 1: Railing  Support Devices 1: Gait belt (+ knee immobilizer)  Assistance 1: Close supervision, Minimal verbal cues (cues for sequencing and hand placement)  Comment/Number of Steps 1: three 6\" steps completed via step to pattern  Stairs 2  Rails 2: None (Comment)  Device 2: Single point cane  Support Devices 2: Gait belt (+knee immobilizer)  Assistance 2: Close supervision, Hand held assistance, Minimal verbal cues (cues " "for sequencing)  Comment/Number of Steps 2: six 6\" steps completed via step to pattern (PT encouraged pt to purchase single point cane prior to DC home to utilize during stair negotiation; pt agreeable)    Outcome Measures:  [facility] Basic Mobility  Turning from your back to your side while in a flat bed without using bedrails: None  Moving from lying on your back to sitting on the side of a flat bed without using bedrails: None  Moving to and from bed to chair (including a wheelchair): None  Standing up from a chair using your arms (e.g. wheelchair or bedside chair): None  To walk in hospital room: None  Climbing 3-5 steps with railing: A little  Basic Mobility - Total Score: 23    Education Documentation  Handouts, taught by Jessa Calvert PT at 6/22/2024  1:30 PM.  Learner: Patient  Readiness: Eager  Method: Explanation, Demonstration, Handout  Response: Verbalizes Understanding, Demonstrated Understanding    Precautions, taught by Jessa Calvert PT at 6/22/2024  1:30 PM.  Learner: Patient  Readiness: Eager  Method: Explanation, Demonstration, Handout  Response: Verbalizes Understanding, Demonstrated Understanding    Home Exercise Program, taught by Jessa Calvert PT at 6/22/2024  1:30 PM.  Learner: Patient  Readiness: Eager  Method: Explanation, Demonstration, Handout  Response: Verbalizes Understanding, Demonstrated Understanding    Mobility Training, taught by Jessa Calvert PT at 6/22/2024  1:30 PM.  Learner: Patient  Readiness: Eager  Method: Explanation, Demonstration, Handout  Response: Verbalizes Understanding, Demonstrated Understanding    Education Comments  No comments found.        OP EDUCATION:  Outpatient Education  Individual(s) Educated: Patient  Education Provided: Anatomy, Body Mechanics, Fall Risk, Home Exercise Program, Home Safety, Physiology, POC, Post-Op Precautions, Posture  Equipment: Compression Sleeve, Ice Pack, Brace/Splint  Risk and Benefits Discussed with " Patient/Caregiver/Other: yes  Patient/Caregiver Demonstrated Understanding: yes  Plan of Care Discussed and Agreed Upon: yes  Patient Response to Education: Patient/Caregiver Verbalized Understanding of Information, Patient/Caregiver Performed Return Demonstration of Exercises/Activities, Patient/Caregiver Asked Appropriate Questions    Encounter Problems       Encounter Problems (Active)       Mobility       LTG - Patient will ambulate household distance MOD I with RW  (Progressing)       Start:  06/21/24            LTG - Patient will navigate 4-6 steps with rails/device (Met)       Start:  06/21/24    Resolved:  06/22/24         LTG - Patient will demonstrate safe mobility requirements MOD I with RW  (Progressing)       Start:  06/21/24               PT Transfers       LTG - Patient will demonstrate safe transfer techniques MOD I with RW  (Progressing)       Start:  06/21/24               Pain             Encounter Problems (Resolved)       Balance       LTG - Patient will maintain balance to allow for safe mobility with least restrictive assistive device  (Met)       Start:  06/21/24    Resolved:  06/22/24            Compromised Skin Integrity       LTG - Patient will maintain/improve skin integrity through proper skin care techniques (Met)       Start:  06/21/24    Resolved:  06/22/24            Safety       LTG - Patient will demonstrate safety requirements appropriate to situation/environment (Met)       Start:  06/21/24    Resolved:  06/22/24                  Jessa Calvert, PT, DPT

## 2024-06-22 NOTE — CARE PLAN
Problem: Pain  Goal: Takes deep breaths with improved pain control throughout the shift  Outcome: Progressing     Problem: Pain  Goal: Turns in bed with improved pain control throughout the shift  Outcome: Progressing     Problem: Pain  Goal: Walks with improved pain control throughout the shift  Outcome: Progressing     Problem: Pain  Goal: Performs ADL's with improved pain control throughout shift  Outcome: Progressing     Problem: Pain  Goal: Participates in PT with improved pain control throughout the shift  Outcome: Progressing     Problem: Pain  Goal: Free from opioid side effects throughout the shift  Outcome: Progressing     Problem: Pain  Goal: Free from acute confusion related to pain meds throughout the shift  Outcome: Progressing     Problem: Fall/Injury  Goal: Not fall by end of shift  Outcome: Progressing     Problem: Fall/Injury  Goal: Be free from injury by end of the shift  Outcome: Progressing     Problem: Fall/Injury  Goal: Use assistive devices by end of the shift  Outcome: Progressing     Problem: Fall/Injury  Goal: Pace activities to prevent fatigue by end of the shift  Outcome: Progressing   The patient's goals for the shift include      The clinical goals for the shift include pain control    Over the shift, the patient did not make progress toward the following goals. Barriers to progression include pain meds, ice packs and ice man cooler, reposition for comfort. Recommendations to address these barriers include  pain meds, ice packs and ice man cooler, reposition for comfort..

## 2024-06-22 NOTE — NURSING NOTE
Assumed care of pt from night shift, RN. Pt resting in bed with call light in reach. 2+ pulses and cap refill brisk. Lung sounds clear bilaterally. No stated needs.

## 2024-06-22 NOTE — NURSING NOTE
Patient was given prn flexeril as ordered for muscle pain left knee, education was provided. No further needs noted, call light is within reach.

## 2024-06-22 NOTE — HH CARE COORDINATION
Home Care received a Referral for Physical Therapy. We have processed the referral for an Ortho Start of Care on 6.23.2024.     If you have any questions or concerns, please feel free to contact us at [phone]. Follow the prompts, enter your five digit zip code, and you will be directed to your care team on [facility].

## 2024-06-22 NOTE — NURSING NOTE
Patient stated dilaudid helped with pain rates 5/10 comfortable level for her, no c/o nausea noted at this time, call light is within reach.

## 2024-06-22 NOTE — DISCHARGE SUMMARY
Discharge Diagnosis  Primary osteoarthritis of left knee    Issues Requiring Follow-Up  Physical therapy    Test Results Pending At Discharge  Pending Labs       No current pending labs.            Hospital Course   Admitted for elective left knee replacement.  Underwent uncomplicated left total knee arthroplasty.  Admitted to the floor for observation.  Anticipate discharge home after clearance by physical therapy.    Pertinent Physical Exam At Time of Discharge  Physical Exam  Left knee: Dressing intact.  Fires tibialis anterior, gastrocsoleus and extensor houses longus.  Sensation intact light touch.  Brisk capillary refill.  Able to perform straight leg raise test.    Home Medications     Medication List      START taking these medications     aspirin 81 mg chewable tablet; Chew 1 tablet (81 mg) 2 times a day.   cefadroxil 500 mg capsule; Commonly known as: Duricef; Take 1 capsule   (500 mg) by mouth 2 times a day for 5 days.   docusate sodium 100 mg capsule; Commonly known as: Colace; Take 1   capsule (100 mg) by mouth 2 times a day for 15 days.   meloxicam 15 mg tablet; Commonly known as: Mobic; Take 1 tablet (15 mg)   by mouth once daily.   ondansetron 4 mg tablet; Commonly known as: Zofran; Take 1 tablet (4 mg)   by mouth every 8 hours if needed for nausea or vomiting.   oxyCODONE 5 mg immediate release tablet; Commonly known as: Roxicodone;   Take 1-2 tablets (5-10 mg) by mouth every 6 hours if needed for severe   pain (7 - 10) for up to 7 days.   Pain Relief ES (acetaminophen) 500 mg tablet; Generic drug:   acetaminophen; Take 2 tablets (1,000 mg) by mouth every 8 hours for 20   days.   senna 8.6 mg tablet; Generic drug: sennosides; Take 1 tablet (8.6 mg) by   mouth once daily for 15 days.     CHANGE how you take these medications     pantoprazole 40 mg EC tablet; Commonly known as: ProtoNix; Take 1 tablet   (40 mg) by mouth once daily in the morning. Take before meals. Do not   crush, chew, or split.;  "What changed: medication strength, how much to   take, when to take this, additional instructions     CONTINUE taking these medications     * ALBUTEROL SULFATE INHL   * albuterol 0.63 mg/3 mL nebulizer solution   fenofibrate 160 mg tablet; Commonly known as: Triglide; Take 1 tablet   (160 mg) by mouth once daily.   glimepiride 4 mg tablet; Commonly known as: Amaryl; TAKE 1 TABLET BY   MOUTH EVERY DAY WITH BREAKFAST   * lancets misc   * OneTouch Delica Lancets 33 gauge misc; Generic drug: lancets   losartan 100 mg tablet; Commonly known as: Cozaar; Take 1 tablet (100   mg) by mouth once daily.   metFORMIN  mg 24 hr tablet; Commonly known as: Glucophage-XR; Take   1 tablet (500 mg) by mouth 2 times a day. Twice a day   ONETOUCH VERIO TEST STRIPS MISC   pen needle 1/2\" 29G X 12mm needle; Use to inject 1-4 times daily as   directed.   semaglutide 1 mg/dose (4 mg/3 mL) pen injector; Commonly known as:   OZEMPIC; Inject 1 mg under the skin 1 (one) time per week.   simvastatin 20 mg tablet; Commonly known as: Zocor; Take 1 tablet (20   mg) by mouth once daily at bedtime. Daily (Please schedule an appointment)  * This list has 4 medication(s) that are the same as other medications   prescribed for you. Read the directions carefully, and ask your doctor or   other care provider to review them with you.     STOP taking these medications     chlorhexidine 0.12 % solution; Commonly known as: Peridex   cyclobenzaprine 10 mg tablet; Commonly known as: Flexeril   fluocinonide 0.05 % external solution; Commonly known as: Lidex   fluticasone furoate-vilanteroL 100-25 mcg/dose inhaler; Commonly known   as: Breo Ellipta   triamterene-hydrochlorothiazid 37.5-25 mg tablet; Commonly known as:   Maxzide-25       Outpatient Follow-Up  Future Appointments   Date Time Provider Department [place]   7/9/2024  1:45 PM Gus Glover MD [id number] [facility]   7/12/2024  9:30 AM Philip Kurtz PT GEAWarrPT [facility]   7/30/2024  2:00 PM Gus ABBASI" MD [place] [id number] [facility] MD [place]

## 2024-06-22 NOTE — NURSING NOTE
Patient just woke up wanting to go to bathrrom, ambulated to and from bathroom with assistancex1 via walker, gait is steady. Pt requested oxycodone for left knee pain and wants to use ice packs instead of ice man cooler.

## 2024-06-23 ENCOUNTER — HOME CARE VISIT (OUTPATIENT)
Dept: HOME HEALTH SERVICES | Facility: HOME HEALTH | Age: 59
End: 2024-06-23
Payer: COMMERCIAL

## 2024-06-23 VITALS — OXYGEN SATURATION: 94 % | HEART RATE: 64 BPM | SYSTOLIC BLOOD PRESSURE: 138 MMHG | DIASTOLIC BLOOD PRESSURE: 80 MMHG

## 2024-06-23 PROCEDURE — G0151 HHCP-SERV OF PT,EA 15 MIN: HCPCS

## 2024-06-23 PROCEDURE — 0023 HH SOC

## 2024-06-23 ASSESSMENT — ENCOUNTER SYMPTOMS
PERSON REPORTING PAIN: PATIENT
PAIN LOCATION - PAIN QUALITY: THROBBING
NAUSEA: 1
VOMITING: 1
PAIN LOCATION: LEFT KNEE
PAIN LOCATION - PAIN FREQUENCY: CONSTANT
PAIN LOCATION - PAIN SEVERITY: 7/10
PAIN: 1

## 2024-06-23 ASSESSMENT — ACTIVITIES OF DAILY LIVING (ADL)
ENTERING_EXITING_HOME: NEEDS ASSISTANCE
OASIS_M1830: 04

## 2024-06-25 NOTE — SIGNIFICANT EVENT
Thank you for taking my call today regarding your recent joint replacement surgery with Dr. Gus Glover.      We discussed that: Home Health Care services (physical and/or occupational therapy) have been initiated Your pain is Controlled on the current regimen Will fluctuate throughout recovery with increased activity You are able to tolerate regular activity and exercises The importance of continued cold therapy throughout recovery The importance of following the prescribed precautions by your surgeon You have had a bowel movement The importance of continuing blood thinner as prescribed The importance of wearing compression stockings as prescribed Outpatient Physical Therapy is scheduled to begin: 7/12    You indicated that all of your questions have been answered at the time of our call.    Please don't hesitate to reach out if you have any additional questions or concerns.    Larissa Centeno MBA, BSN, RN-BC  NAMRATA Murphy, RN  [facility]  [facility] [phone]

## 2024-06-26 ENCOUNTER — HOME CARE VISIT (OUTPATIENT)
Dept: HOME HEALTH SERVICES | Facility: HOME HEALTH | Age: 59
End: 2024-06-26
Payer: COMMERCIAL

## 2024-06-26 VITALS
DIASTOLIC BLOOD PRESSURE: 62 MMHG | HEART RATE: 78 BPM | OXYGEN SATURATION: 98 % | TEMPERATURE: 97.4 F | SYSTOLIC BLOOD PRESSURE: 130 MMHG

## 2024-06-26 PROCEDURE — G0151 HHCP-SERV OF PT,EA 15 MIN: HCPCS

## 2024-06-26 ASSESSMENT — ENCOUNTER SYMPTOMS
HIGHEST PAIN SEVERITY IN PAST 24 HOURS: 7/10
SUBJECTIVE PAIN PROGRESSION: GRADUALLY IMPROVING
LOWEST PAIN SEVERITY IN PAST 24 HOURS: 4/10
PAIN: 1

## 2024-06-28 ENCOUNTER — HOME CARE VISIT (OUTPATIENT)
Dept: HOME HEALTH SERVICES | Facility: HOME HEALTH | Age: 59
End: 2024-06-28
Payer: COMMERCIAL

## 2024-06-28 VITALS
HEART RATE: 78 BPM | OXYGEN SATURATION: 98 % | TEMPERATURE: 97.6 F | SYSTOLIC BLOOD PRESSURE: 138 MMHG | DIASTOLIC BLOOD PRESSURE: 70 MMHG

## 2024-06-28 PROCEDURE — G0151 HHCP-SERV OF PT,EA 15 MIN: HCPCS

## 2024-06-28 ASSESSMENT — PAIN SCALES - PAIN ASSESSMENT IN ADVANCED DEMENTIA (PAINAD)
FACIALEXPRESSION: 0 - SMILING OR INEXPRESSIVE.
NEGVOCALIZATION: 0
BODYLANGUAGE: 0 - RELAXED.
FACIALEXPRESSION: 0
CONSOLABILITY: 0
BREATHING: 0
BODYLANGUAGE: 0
CONSOLABILITY: 0 - NO NEED TO CONSOLE.
NEGVOCALIZATION: 0 - NONE.
TOTALSCORE: 0

## 2024-06-28 ASSESSMENT — ENCOUNTER SYMPTOMS
PAIN: 1
SUBJECTIVE PAIN PROGRESSION: GRADUALLY IMPROVING
LOWEST PAIN SEVERITY IN PAST 24 HOURS: 4/10
HIGHEST PAIN SEVERITY IN PAST 24 HOURS: 6/10
PERSON REPORTING PAIN: PATIENT

## 2024-07-01 ENCOUNTER — HOME CARE VISIT (OUTPATIENT)
Dept: HOME HEALTH SERVICES | Facility: HOME HEALTH | Age: 59
End: 2024-07-01
Payer: COMMERCIAL

## 2024-07-01 VITALS
OXYGEN SATURATION: 98 % | SYSTOLIC BLOOD PRESSURE: 112 MMHG | TEMPERATURE: 97.4 F | HEART RATE: 78 BPM | DIASTOLIC BLOOD PRESSURE: 70 MMHG

## 2024-07-01 DIAGNOSIS — M17.12 PRIMARY OSTEOARTHRITIS OF LEFT KNEE: ICD-10-CM

## 2024-07-01 PROCEDURE — G0151 HHCP-SERV OF PT,EA 15 MIN: HCPCS

## 2024-07-01 RX ORDER — ONDANSETRON 4 MG/1
4 TABLET, FILM COATED ORAL EVERY 8 HOURS PRN
Qty: 20 TABLET | Refills: 0 | Status: SHIPPED | OUTPATIENT
Start: 2024-07-01

## 2024-07-01 RX ORDER — OXYCODONE HYDROCHLORIDE 5 MG/1
5-10 TABLET ORAL EVERY 6 HOURS PRN
Qty: 40 TABLET | Refills: 0 | Status: SHIPPED | OUTPATIENT
Start: 2024-07-01 | End: 2024-07-08

## 2024-07-01 ASSESSMENT — ENCOUNTER SYMPTOMS
LOWEST PAIN SEVERITY IN PAST 24 HOURS: 5/10
SUBJECTIVE PAIN PROGRESSION: GRADUALLY IMPROVING
HIGHEST PAIN SEVERITY IN PAST 24 HOURS: 7/10
PAIN: 1

## 2024-07-01 ASSESSMENT — ACTIVITIES OF DAILY LIVING (ADL): AMBULATION ASSISTANCE ON UNEVEN SURFACES: 1

## 2024-07-01 NOTE — TELEPHONE ENCOUNTER
Due to the nature of the surgery and the necessary post-operative rehabilitation, the patient will require more than 30 morphine MEQ per day for 6 days. The patient occasionally patient rates his pain a 9 or 10 on the pain scale.  I have personally reviewed the OARRS report for this patient. This report is scanned into the electronic medical record. I have considered the risks of abuse, dependence, addiction and diversion.

## 2024-07-03 ENCOUNTER — HOME CARE VISIT (OUTPATIENT)
Dept: HOME HEALTH SERVICES | Facility: HOME HEALTH | Age: 59
End: 2024-07-03
Payer: COMMERCIAL

## 2024-07-03 VITALS — SYSTOLIC BLOOD PRESSURE: 118 MMHG | OXYGEN SATURATION: 96 % | DIASTOLIC BLOOD PRESSURE: 70 MMHG | HEART RATE: 78 BPM

## 2024-07-03 PROCEDURE — G0151 HHCP-SERV OF PT,EA 15 MIN: HCPCS

## 2024-07-03 ASSESSMENT — PAIN SCALES - PAIN ASSESSMENT IN ADVANCED DEMENTIA (PAINAD)
NEGVOCALIZATION: 0
CONSOLABILITY: 0
BREATHING: 0
BODYLANGUAGE: 0
NEGVOCALIZATION: 0 - NONE.
CONSOLABILITY: 0 - NO NEED TO CONSOLE.
FACIALEXPRESSION: 0
FACIALEXPRESSION: 0 - SMILING OR INEXPRESSIVE.
TOTALSCORE: 0
BODYLANGUAGE: 0 - RELAXED.

## 2024-07-03 ASSESSMENT — ENCOUNTER SYMPTOMS
SUBJECTIVE PAIN PROGRESSION: GRADUALLY IMPROVING
PERSON REPORTING PAIN: PATIENT
LOWEST PAIN SEVERITY IN PAST 24 HOURS: 4/10
PAIN: 1
HIGHEST PAIN SEVERITY IN PAST 24 HOURS: 6/10

## 2024-07-08 ENCOUNTER — HOME CARE VISIT (OUTPATIENT)
Dept: HOME HEALTH SERVICES | Facility: HOME HEALTH | Age: 59
End: 2024-07-08
Payer: COMMERCIAL

## 2024-07-08 PROCEDURE — G0151 HHCP-SERV OF PT,EA 15 MIN: HCPCS

## 2024-07-09 ENCOUNTER — OFFICE VISIT (OUTPATIENT)
Dept: ORTHOPEDIC SURGERY | Facility: CLINIC | Age: 59
End: 2024-07-09
Payer: COMMERCIAL

## 2024-07-09 DIAGNOSIS — M17.12 PRIMARY OSTEOARTHRITIS OF LEFT KNEE: ICD-10-CM

## 2024-07-09 DIAGNOSIS — Z96.652 S/P TOTAL KNEE ARTHROPLASTY, LEFT: Primary | ICD-10-CM

## 2024-07-09 PROCEDURE — 3008F BODY MASS INDEX DOCD: CPT

## 2024-07-09 PROCEDURE — 1036F TOBACCO NON-USER: CPT

## 2024-07-09 PROCEDURE — 3044F HG A1C LEVEL LT 7.0%: CPT

## 2024-07-09 PROCEDURE — 99211 OFF/OP EST MAY X REQ PHY/QHP: CPT

## 2024-07-09 PROCEDURE — 4010F ACE/ARB THERAPY RXD/TAKEN: CPT

## 2024-07-09 RX ORDER — TRAMADOL HYDROCHLORIDE 50 MG/1
50-100 TABLET ORAL EVERY 6 HOURS PRN
Qty: 40 TABLET | Refills: 0 | Status: SHIPPED | OUTPATIENT
Start: 2024-07-09 | End: 2024-07-16

## 2024-07-09 RX ORDER — ONDANSETRON 4 MG/1
4 TABLET, FILM COATED ORAL EVERY 8 HOURS PRN
Qty: 20 TABLET | Refills: 0 | Status: SHIPPED | OUTPATIENT
Start: 2024-07-09

## 2024-07-09 NOTE — PROGRESS NOTES
SHANTA Champion, PA-C  Division of Adult Reconstruction  Phone: [phone]  Fax: [phone]          HPI:  Diagnosis: End stage osteoarthritis left Knee  Procedure Performed: left Total Knee Arthroplasty   Date of Surgery: June 21, 2024    Kim Yi is a pleasant 59 y.o. year-old female here for regularly schedule follow-up of their left total knee arthroplasty by Dr. Glover.  The patient is approximately 2 weeks postop. The patient has no specific complaints other than occasional discomfort. The patient has no mechanical symptoms. The patient has mild swelling and moderate pain. The patient is using Oxycodone and Tylenol for pain control. She states she has taken Tramadol in the past and it has given her a headache, but she would like to try it again to help with the pain. She also states she has had nausea but the Zofran helps with that. The patient has been compliant with EMA compression stockings as prescribed. They have been working with home physical therapy and have not progressed to outpatient PT. Their outpatient PT is scheduled to start 7/12 . The patient is ambulatory with a cane. The patient has been compliant with their prescribed ASA for VTE prophylaxis. The patient has had no fall or trauma. The patient's wound has healed uneventfully. The patient denies: fevers, chills, incisional drainage, joint instability, chest or calf pain, shortness of breath.    Review of systems:  There has been no interval change in this patient's past medical, surgical, medications, allergies, family history or social history since the most recent visit to a provider within our department. 14 point review of systems was performed, reviewed, and negative except for pertinent positives documented in the history of present illness.    Past Medical History:   Diagnosis Date    Alopecia areata     Scalp    Asthma (HHS-HCC)     during change of seasons    BMI 39.0-39.9,adult     GERD  (gastroesophageal reflux disease)     History of transient ischemic attack (TIA)     approx 2011? Had numbness on left side body. No recurrence.    Hx of peptic ulcer     2/2009 EGD: Acute prepyloric gastric ulcer and erosive antral gastritis; pathology + H Pylori    Hyperlipidemia     Hypertension     Osteoarthritis     Pancreatitis (HHS-HCC)     x1 remotely    Type 2 diabetes mellitus without complications (Multi)      Patient Active Problem List   Diagnosis    History of abdominal paracentesis    Abnormal mammogram    Achilles tendinosis    Anemia    Anxiety    Carpal tunnel syndrome of left wrist    Diabetes mellitus (Multi)    Type 2 diabetes mellitus with other specified complication, without long-term current use of insulin (Multi)    Dyslipidemia    Dysmenorrhea    Gastro-esophageal reflux    Groin abscess    Osmani's deformity    Hordeolum externum of left lower eyelid    Hypertension    Low back pain    Menopausal symptoms    Menorrhagia with regular cycle    Mild persistent asthma without complication (HHS-HCC)    Morbid obesity with body mass index (BMI) of 40.0 or higher (Multi)    Morbid obesity with BMI of 40.0-44.9, adult (Multi)    Obesity    Primary localized osteoarthritis of both knees    Primary osteoarthritis of right knee    Strain of quadriceps muscle, right, initial encounter    Vaginal cyst    S/P total knee arthroplasty, left     Medication Documentation Review Audit       Reviewed by Nicki Hand CMA (Medical Assistant) on 07/09/24 at 1302      Medication Order Taking? Sig Documenting Provider Last Dose Status   acetaminophen (Tylenol) 500 mg tablet [id number]  Take 2 tablets (1,000 mg) by mouth every 8 hours for 20 days. Gus Glover MD  Active   albuterol 0.63 mg/3 mL nebulizer solution [id number] No Use 1 vial in nebulizer 3 times daily as needed Historical Provider, MD More than a month Active   ALBUTEROL SULFATE INHL [id number] No Inhale 2 puffs. Every 4-6 hours as  directed  Albuterol Sulfate  (90 Base) MCG/ACT Inhalation Aerosol Solution (ProAir HFA) Historical Provider, MD More than a month Active   aspirin 81 mg chewable tablet [id number]  Chew 1 tablet (81 mg) 2 times a day. Gus Glover MD  Active   blood sugar diagnostic (ONETOUCH VERIO TEST STRIPS MISC) [id number] No OneTouch Verio In Vitro Strip  Test twice daily Historical Provider, MD 2024 Active   docusate sodium (Colace) 100 mg capsule [id number]  Take 1 capsule (100 mg) by mouth 2 times a day for 15 days. Gus Glover MD   24 2359   fenofibrate (Triglide) 160 mg tablet [id number] No Take 1 tablet (160 mg) by mouth once daily. Amy Moreland MD 2024 Active   glimepiride (Amaryl) 4 mg tablet [id number] No TAKE 1 TABLET BY MOUTH EVERY DAY WITH BREAKFAST Amy Moreland MD 2024 Active   lancets (OneTouch Delica Lancets) 33 gauge misc [id number] No Test twice daily Historical Provider, MD 2024 Active   lancets misc [id number] No BD Lancet Ultrafine 33G Historical Provider, MD 2024 Active   losartan (Cozaar) 100 mg tablet [id number] No Take 1 tablet (100 mg) by mouth once daily. Amy Moreland MD 2024 Active   meloxicam (Mobic) 15 mg tablet [id number]  Take 1 tablet (15 mg) by mouth once daily. Gus Glover MD  Active   metFORMIN  mg 24 hr tablet [id number] No Take 1 tablet (500 mg) by mouth 2 times a day. Twice a day Amy Moreland MD 2024 Active   ondansetron (Zofran) 4 mg tablet [id number]  Take 1 tablet (4 mg) by mouth every 8 hours if needed for nausea or vomiting. Sarah Peña PA-C  Active   oxyCODONE (Roxicodone) 5 mg immediate release tablet [id number]  Take 1-2 tablets (5-10 mg) by mouth every 6 hours if needed for severe pain (7 - 10) for up to 7 days. Sarah Peña PA-C   24 5803   pantoprazole (ProtoNix) 40 mg EC tablet [id number]  Take 1 tablet (40 mg) by mouth once daily in the morning. Take before meals. Do not  "crush, chew, or split. Gus Glover MD  Active   pen needle 1/2\" 29G X 12mm needle [id number] No Use to inject 1-4 times daily as directed. Amy Moreland MD 2024 Active   semaglutide (OZEMPIC) 1 mg/dose (4 mg/3 mL) pen injector [id number] No Inject 1 mg under the skin 1 (one) time per week. Amy Moreland MD Past Month Active   sennosides (Senokot) 8.6 mg tablet [id number]  Take 1 tablet (8.6 mg) by mouth once daily for 15 days. Gus Glover MD   24 4922   simvastatin (Zocor) 20 mg tablet [id number] No Take 1 tablet (20 mg) by mouth once daily at bedtime. Daily (Please schedule an appointment) Amy Moreland MD 2024 Active                  Allergies   Allergen Reactions    Other Hives, Other and Unknown     Darvocet-N 100 TABS    Other=Vomiting    Oxycodone-Acetaminophen Headache     Percocet TABS    Tramadol Headache     Social History     Socioeconomic History    Marital status:      Spouse name: Not on file    Number of children: Not on file    Years of education: Not on file    Highest education level: Not on file   Occupational History    Not on file   Tobacco Use    Smoking status: Never    Smokeless tobacco: Never   Vaping Use    Vaping status: Never Used   Substance and Sexual Activity    Alcohol use: Not Currently     Comment: socially    Drug use: Never    Sexual activity: Not on file   Other Topics Concern    Not on file   Social History Narrative    Not on file     Social Determinants of Health     Financial Resource Strain: Low Risk  (2024)    Overall Financial Resource Strain (CARDIA)     Difficulty of Paying Living Expenses: Not hard at all   Food Insecurity: Not on file   Transportation Needs: No Transportation Needs (2024)    OASIS : Transportation     Lack of Transportation (Medical): No     Lack of Transportation (Non-Medical): No     Patient Unable or Declines to Respond: No   Physical Activity: Not on file   Stress: Not on file "   Social Connections: Feeling Socially Integrated (2024)    OASIS : Social Isolation     Frequency of experiencing loneliness or isolation: Never   Intimate Partner Violence: Not on file   Housing Stability: Low Risk  (2024)    Housing Stability Vital Sign     Unable to Pay for Housing in the Last Year: No     Number of Places Lived in the Last Year: 1     Unstable Housing in the Last Year: No     Past Surgical History:   Procedure Laterality Date    APPENDECTOMY      CARDIAC CATHETERIZATION  2016    CONCLUSIONS: Normal coronary arteries in a right dominant system. Normal left ventricular systolic function.     SECTION, CLASSIC      CHOLECYSTECTOMY      COLONOSCOPY      CORNEAL TRANSPLANT Left     ESOPHAGOSCOPY / EGD      GANGLION CYST EXCISION      wrist and ankle    TOTAL KNEE ARTHROPLASTY Left 2024    VENTRAL HERNIA REPAIR  2007       Physical Exam:  General: Well-appearing female is alert and oriented x 3 and appears comfortable. NAD. Pleasant and cooperative.  Mood: Euthymic  Respirations: non-labored  Gait: Slight antalgic  Assistive Device: cane. Coordination and balance intact.    left Knee  No other overlying lesion  Wound: Incision is healing well, midline with no signs of surrounding infection, erythema, fluctuance, dehiscence, drainage, or suture abscess. Steri-strip in place. There is mild warmth and effusion about the knee, both consistent with the normal post-operative healing.   Range of motion: 3 - 94  Stable to varus and valgus stress at 0 and 30 degrees.   Patella tracks midline.  Calf: No swelling or tenderness. Lower extremity warm and well perfused.  Able to dorsi-flex and plantar-flex the ankle with appropriate strength. Able to wiggle all toes.   The foot is warm and well perfused with palpable pulses.   Sensation is intact to light touch.   Pulses: Palpable DP pulse    Imaging:  No radiographs obtained today.    Impression/Plan:  Kim Yi is  doing well and progressing well approximately 2 weeks s/p left total knee arthroplasty. I am satisfied with the patient's recovery to this point.     A thorough discussion was had with the patient concerning the postoperative course and the patient is in agreement with the plan.  Continued physical therapy with gait training to improve strength and stamina. Progress off support as tolerated. At this time, you may gradually increase your activities and get back to a normal, low-impact lifestyle. Please avoid running, jumping, and heavy lifting. No kneeling on the operative knee until 3 months post op, at that time one should put a pillow, pad, or blanket under the knee when kneeling.  Continue with current pain regimen of Oxycodone, Tramadol, and Tylenol. We discussed strategies to wean off of opioid medications as tolerated. Transition to tylenol. Can incorporate NSAIDs after completion of prophylactic anticoagulation. Due to the nature of the surgery and the necessary post-operative rehabilitation, the patient will require more than 30 morphine MEQ per day for 6 days. The patient occasionally patient rates his pain a 9 or 10 on the pain scale. I have personally reviewed the OARRS report for this patient. This report is scanned into the electronic medical record. I have considered the risks of abuse, dependence, addiction and diversion.   No swimming or tub bathing until 3 months post op.  Do not visit dentist until 3 months after surgery unless it is an emergency. Reviewed the need for prophylactic antibiotics prior to any dental or other invasive procedures. Patient understands that their dentist or myself may prescribe.  Follow-up in 3 weeks with radiographs or sooner if there are any concerns.     All questions answered.    Follow-up 3 weeks with x-rays at next visit.    SHANTA Harley, PACourtneyC  Orthopedic Physician Assisant  Division of Adult Reconstruction  Department of Orthopaedics  [facility]  [place] [id number]

## 2024-07-10 ENCOUNTER — HOME CARE VISIT (OUTPATIENT)
Dept: HOME HEALTH SERVICES | Facility: HOME HEALTH | Age: 59
End: 2024-07-10
Payer: COMMERCIAL

## 2024-07-10 VITALS — OXYGEN SATURATION: 98 % | HEART RATE: 76 BPM | DIASTOLIC BLOOD PRESSURE: 80 MMHG | SYSTOLIC BLOOD PRESSURE: 120 MMHG

## 2024-07-10 PROCEDURE — G0151 HHCP-SERV OF PT,EA 15 MIN: HCPCS

## 2024-07-10 ASSESSMENT — PAIN SCALES - PAIN ASSESSMENT IN ADVANCED DEMENTIA (PAINAD)
NEGVOCALIZATION: 0 - NONE.
BODYLANGUAGE: 0
BODYLANGUAGE: 0 - RELAXED.
NEGVOCALIZATION: 0
BREATHING: 0
CONSOLABILITY: 0
FACIALEXPRESSION: 0
TOTALSCORE: 0
FACIALEXPRESSION: 0 - SMILING OR INEXPRESSIVE.
CONSOLABILITY: 0 - NO NEED TO CONSOLE.

## 2024-07-10 ASSESSMENT — ENCOUNTER SYMPTOMS
SUBJECTIVE PAIN PROGRESSION: GRADUALLY IMPROVING
HIGHEST PAIN SEVERITY IN PAST 24 HOURS: 6/10
PAIN: 1
LOWEST PAIN SEVERITY IN PAST 24 HOURS: 3/10
PERSON REPORTING PAIN: PATIENT

## 2024-07-10 ASSESSMENT — ACTIVITIES OF DAILY LIVING (ADL)
HOME_HEALTH_OASIS: 00
OASIS_M1830: 00

## 2024-07-10 NOTE — CASE COMMUNICATION
DISCHARGE SUMMARY:    DISCIPLINE:PT  DATE OF DISCIPLINE DISCHARGE: 77487  REASON FOR DISCHARGE: goals met  COORDINATION NOTE: dc to outpatient   EVALUATION OF GOALS: yes  SUMMARY OF CARE PROVIDED: hep, ambulation  DISCHARGE INSTRUCTIONS GIVEN: follow up with outpatient   SERVICES REMAINING: none  NOMNC OBTAINED: na

## 2024-07-12 ENCOUNTER — EVALUATION (OUTPATIENT)
Dept: PHYSICAL THERAPY | Facility: CLINIC | Age: 59
End: 2024-07-12
Payer: COMMERCIAL

## 2024-07-12 DIAGNOSIS — M25.662 DECREASED RANGE OF MOTION (ROM) OF LEFT KNEE: Primary | ICD-10-CM

## 2024-07-12 DIAGNOSIS — R26.89 DECREASED MOBILITY: ICD-10-CM

## 2024-07-12 DIAGNOSIS — Z96.652 AFTERCARE FOLLOWING LEFT KNEE JOINT REPLACEMENT SURGERY: ICD-10-CM

## 2024-07-12 DIAGNOSIS — M17.12 PRIMARY OSTEOARTHRITIS OF LEFT KNEE: ICD-10-CM

## 2024-07-12 DIAGNOSIS — Z47.1 AFTERCARE FOLLOWING LEFT KNEE JOINT REPLACEMENT SURGERY: ICD-10-CM

## 2024-07-12 PROCEDURE — 97161 PT EVAL LOW COMPLEX 20 MIN: CPT | Mod: GP | Performed by: PHYSICAL THERAPIST

## 2024-07-12 PROCEDURE — 97110 THERAPEUTIC EXERCISES: CPT | Mod: GP | Performed by: PHYSICAL THERAPIST

## 2024-07-12 PROCEDURE — 97140 MANUAL THERAPY 1/> REGIONS: CPT | Mod: GP | Performed by: PHYSICAL THERAPIST

## 2024-07-12 ASSESSMENT — ENCOUNTER SYMPTOMS
OCCASIONAL FEELINGS OF UNSTEADINESS: 0
DEPRESSION: 0
LOSS OF SENSATION IN FEET: 0

## 2024-07-12 NOTE — PROGRESS NOTES
Patient Name: Kim Yi  MRN: [id number]  Today's Date: 7/12/2024  Visit #1  Payor: [facility] / Plan: [facility] MED / Product Type: *No Product type* /   No auth. 60 visits. $15 copay  Time Calculation  Start Time: 0930  Stop Time: 1030  Time Calculation (min): 60 min  Reason for Referral: left TKA  Referred By: Dr. Glover  Preferred Learning Style: written, kinesthetic  PT Evaluation Time Entry  PT Evaluation (Low) Time Entry: 35             Current Problem  Problem List Items Addressed This Visit    None  Visit Diagnoses         Codes    Decreased range of motion (ROM) of left knee    -  Primary M25.662    Relevant Orders    Follow Up In Physical Therapy    Decreased mobility     R26.89    Relevant Orders    Follow Up In Physical Therapy    Aftercare following left knee joint replacement surgery     Z47.1, Z96.652    Relevant Orders    Follow Up In Physical Therapy    Primary osteoarthritis of left knee     M17.12            Assessment:  Patient is doing well s/p left TKA on 6/21/24 by Dr. Alicia. Left knee PROM is -8 to 98 degrees. She will benefit from skilled PT to increase LE strength, increase Left knee ROM, enhance gait quality and mobility, reduce pain, and maximize her post TKA outcomes. Her goal is to be able to walk community distances, return to work, and improve knee motion.   Plan:   OP PT Plan  Treatment/Interventions: Cryotherapy, Education/ Instruction, Gait training, Manual therapy, Therapeutic exercises, Therapeutic activities, Neuromuscular re-education  PT Plan: Skilled PT  PT Frequency: 2 times per week  Duration: 10 visits  Onset Date: 06/21/24  Rehab Potential: Good  Plan of Care Agreement: Patient     General  Reason for Referral: left TKA  Referred By: Dr. Glover  Preferred Learning Style: written, kinesthetic  Gus Glover    Precautions  Precautions  STEADI Fall Risk Score (The score of 4 or more indicates an increased risk of falling):  4  Precautions Comment: post TKA precautions; no major fall risk       HPI: Left TKA on 6/21/24 by Dr. Glover. Home health PT ended this week. Measured at 94 degrees flexion. No major concerns. Currently using motorized scooter for longer community distances. Plans to get R TKA in 2025    Pain: Left knee pain is 3-6/10. Worst in the morning. Takes tylenol and Oxycodone as needed.     PLOF / Activity level: Likes to travel.   Home setup: Lives with daughters in 1 story home with basement. Has not gone into basement.   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     Objective:  Outcome Measures:   10 meter walk = 20.7 secs with cane  5 times sit to stand without arms = 11.7 secs   LEFS = 29/80  Observation:  Left knee incision healing well - closed with scabbing. Few steri strips still intact. No drainage.   Palpation:    Gait: Antalgic stance bilaterally with cane in right hand. Decreased left knee flexion and push off. Some lateral trunk lean.   Stairs: non-reciprocal with railings.     Lower Extremity Strength:  Date: eval RIGHT LEFT   Hip Flexion 5 4+   Hip Extension 5 4+   Hip Abduction 4+ 4+   Hip Adduction     Knee Extension 5 4+   Knee Flexion 5 4+   Ankle DF 5 5   Ankle PF 5 5   Ankle INV 5 5   Ankle EV 5 5     Lower Extremity PROM:  Date: eval RIGHT LEFT   Hip Flexion     Hip Extension     Hip IR     Hip ER     Knee Extension WNL -8   Knee Flexion WNL 98   Ankle DF     Ankle PF     Ankle INV     Ankle EV       Sensation: intact bilateral LE(s)    Goals:   Active       PT Problem       knee ROM       Start:  07/12/24    Expected End:  10/10/24       Left knee PROM: extension > -4, flexion > 114  Left knee AROM: extension > -5, flexion > 110         gait       Start:  07/12/24    Expected End:  10/10/24       Patient will ambulate with improved left knee flexion, decreased lateral trunk lean and antalgia, increased kimberlyn and gait speed.   10 meter walk < 16 secs with or  without cane         outcome measures       Start:  07/12/24    Expected End:  10/10/24       LEFS > 44  5 times sit to stand < 10 secs without UEs         strength       Start:  07/12/24    Expected End:  10/10/24       Right knee strength 5/5 to enhance functional mobility         function       Start:  07/12/24    Expected End:  10/10/24       Patient will demonstrate adequate strength and knee control to perform reciprocal stair negotiation, squats, and all transfers         Patient Stated Goal 1       Start:  07/12/24    Expected End:  10/10/24       Return to driving, return to work, and walk community distances.

## 2024-07-18 ENCOUNTER — TREATMENT (OUTPATIENT)
Dept: PHYSICAL THERAPY | Facility: CLINIC | Age: 59
End: 2024-07-18
Payer: COMMERCIAL

## 2024-07-18 DIAGNOSIS — M25.662 DECREASED RANGE OF MOTION (ROM) OF LEFT KNEE: ICD-10-CM

## 2024-07-18 DIAGNOSIS — Z96.652 AFTERCARE FOLLOWING LEFT KNEE JOINT REPLACEMENT SURGERY: ICD-10-CM

## 2024-07-18 DIAGNOSIS — Z47.1 AFTERCARE FOLLOWING LEFT KNEE JOINT REPLACEMENT SURGERY: ICD-10-CM

## 2024-07-18 DIAGNOSIS — R26.89 DECREASED MOBILITY: ICD-10-CM

## 2024-07-18 PROCEDURE — 97110 THERAPEUTIC EXERCISES: CPT | Mod: GP | Performed by: PHYSICAL THERAPIST

## 2024-07-18 NOTE — PROGRESS NOTES
Physical Therapy    Physical Therapy Treatment    Patient Name: Kim Yi  MRN: [id number]  Today's Date: 7/18/2024  Visit: 2/20  Insurance: Medical [place]  Authorization: 7/12/24-10/12/24  Time Entry:   Time Calculation  Start Time: 1025  Stop Time: 1115  Time Calculation (min): 50 min     PT Therapeutic Procedures Time Entry  Therapeutic Exercise Time Entry: 40  PT Modalities Time Entry  Hot/Cold Pack Time Entry: 10    Assessment: Good understanding and compliance with HEP.       Plan:   Continue with POC    Current Problem  1. Decreased range of motion (ROM) of left knee  Follow Up In Physical Therapy      2. Decreased mobility  Follow Up In Physical Therapy      3. Aftercare following left knee joint replacement surgery  Follow Up In Physical Therapy          General  Knee pain 6/10 at worst last 2 days  Still doing steps one at a time  Limited to 15-20 minutes walking in store due to discomfort  Left TKA on 6/21/24        Subjective    Precautions: Fall risk       Objective     Independent with HEP  100 degrees active flexion       Treatments:  SciFit stepper 7 minutes  Step stretch 20X   Heel  raises 20X   Practiced heel to toe walking 20' X 4  FWD/BWD walking in parallel bars X 3  Reviewed HEP  Knee extension stretch over bolster 10X  Theraball HS with strap 20X  Knee flexion stretch by PT on theraball 2 X 10  Knee extension stretch by PT 2 X 10  (40 minutes)    CP to the left knee 10 minutes    OP EDUCATION: reviewed HEP        Goals:  Active       PT Problem       knee ROM       Start:  07/12/24    Expected End:  10/10/24       Left knee PROM: extension > -4, flexion > 114  Left knee AROM: extension > -5, flexion > 110         gait       Start:  07/12/24    Expected End:  10/10/24       Patient will ambulate with improved left knee flexion, decreased lateral trunk lean and antalgia, increased kimberlyn and gait speed.   10 meter walk < 16 secs with or without cane         outcome measures       Start:   07/12/24    Expected End:  10/10/24       LEFS > 44  5 times sit to stand < 10 secs without UEs         strength       Start:  07/12/24    Expected End:  10/10/24       Right knee strength 5/5 to enhance functional mobility         function       Start:  07/12/24    Expected End:  10/10/24       Patient will demonstrate adequate strength and knee control to perform reciprocal stair negotiation, squats, and all transfers         Patient Stated Goal 1       Start:  07/12/24    Expected End:  10/10/24       Return to driving, return to work, and walk community distances.

## 2024-07-23 ENCOUNTER — TREATMENT (OUTPATIENT)
Dept: PHYSICAL THERAPY | Facility: CLINIC | Age: 59
End: 2024-07-23
Payer: COMMERCIAL

## 2024-07-23 DIAGNOSIS — Z96.652 AFTERCARE FOLLOWING LEFT KNEE JOINT REPLACEMENT SURGERY: ICD-10-CM

## 2024-07-23 DIAGNOSIS — M25.662 DECREASED RANGE OF MOTION (ROM) OF LEFT KNEE: ICD-10-CM

## 2024-07-23 DIAGNOSIS — R26.89 DECREASED MOBILITY: ICD-10-CM

## 2024-07-23 DIAGNOSIS — Z47.1 AFTERCARE FOLLOWING LEFT KNEE JOINT REPLACEMENT SURGERY: ICD-10-CM

## 2024-07-23 PROCEDURE — 97110 THERAPEUTIC EXERCISES: CPT | Mod: GP | Performed by: PHYSICAL THERAPIST

## 2024-07-23 PROCEDURE — 97140 MANUAL THERAPY 1/> REGIONS: CPT | Mod: GP | Performed by: PHYSICAL THERAPIST

## 2024-07-23 NOTE — PROGRESS NOTES
Physical Therapy    Physical Therapy Treatment    Patient Name: Kim Yi  MRN: [id number]  Today's Date: 7/23/2024  Visit: 3/20  Insurance: Medical [place]  Authorization: 7/12/24-10/12/24  Time Entry:   Time Calculation  Start Time: 1015  Stop Time: 1100  Time Calculation (min): 45 min     PT Therapeutic Procedures Time Entry  Manual Therapy Time Entry: 15  Therapeutic Exercise Time Entry: 30       Assessment: G  Left knee AAROM = -7 to 108 degrees. Knee flexion has improved 10 degrees since Evaluation. Knee extension is grossly unchanged.  We worked extensively on improving her gait by reducing right trunk lean, right head lean, and increasing left weight shift. She was able to mostly correct head and trunk lean, and partially improve left weight shift. She tolerate prone knee hang well and prefers prone quad stretch over supine. She will begin to perform self gentle scar massage.      Plan:   Continue with POC    Current Problem  1. Decreased range of motion (ROM) of left knee  Follow Up In Physical Therapy      2. Decreased mobility  Follow Up In Physical Therapy      3. Aftercare following left knee joint replacement surgery  Follow Up In Physical Therapy          General  Knee pain 6/10 at worst last 2 days  Still doing steps one at a time  Limited to 15-20 minutes walking in store due to discomfort  Left TKA on 6/21/24        Subjective    Precautions: Fall risk       Objective      Treatments:  SciFit stepper 7 minutes  Step stretch for L knee flexion. 10s x 10  Gastroc stretch on step. 30s x 3  L hamstring stretch on step. 30s x 3  Supine quad sets 5s x 15  Supine L SLR. 2 x 10  Prone quad stretch. 30s x 3  Prone knee hang x 5 minutes  Gait training: Cues and mirror to improve right trunk lean, right head tilt, and increase left weight shift.   Manual Therapy:  Manual knee flexion stretch in sitting. Manual knee extension stretching in supine    CP to the left knee 10 minutes    OP EDUCATION: reviewed  HEP        Goals:  Active       PT Problem       knee ROM       Start:  07/12/24    Expected End:  10/10/24       Left knee PROM: extension > -4, flexion > 114  Left knee AROM: extension > -5, flexion > 110         gait       Start:  07/12/24    Expected End:  10/10/24       Patient will ambulate with improved left knee flexion, decreased lateral trunk lean and antalgia, increased kimberlyn and gait speed.   10 meter walk < 16 secs with or without cane         outcome measures       Start:  07/12/24    Expected End:  10/10/24       LEFS > 44  5 times sit to stand < 10 secs without UEs         strength       Start:  07/12/24    Expected End:  10/10/24       Right knee strength 5/5 to enhance functional mobility         function       Start:  07/12/24    Expected End:  10/10/24       Patient will demonstrate adequate strength and knee control to perform reciprocal stair negotiation, squats, and all transfers         Patient Stated Goal 1       Start:  07/12/24    Expected End:  10/10/24       Return to driving, return to work, and walk community distances.

## 2024-07-24 DIAGNOSIS — Z96.652 S/P TOTAL KNEE ARTHROPLASTY, LEFT: Primary | ICD-10-CM

## 2024-07-24 RX ORDER — ONDANSETRON 4 MG/1
4 TABLET, FILM COATED ORAL EVERY 8 HOURS PRN
Qty: 20 TABLET | Refills: 0 | Status: SHIPPED | OUTPATIENT
Start: 2024-07-24

## 2024-07-24 RX ORDER — OXYCODONE HYDROCHLORIDE 5 MG/1
5-10 TABLET ORAL EVERY 6 HOURS PRN
Qty: 40 TABLET | Refills: 0 | Status: SHIPPED | OUTPATIENT
Start: 2024-07-24 | End: 2024-07-31

## 2024-07-26 ENCOUNTER — TREATMENT (OUTPATIENT)
Dept: PHYSICAL THERAPY | Facility: CLINIC | Age: 59
End: 2024-07-26
Payer: COMMERCIAL

## 2024-07-26 DIAGNOSIS — R26.89 DECREASED MOBILITY: ICD-10-CM

## 2024-07-26 DIAGNOSIS — Z47.1 AFTERCARE FOLLOWING LEFT KNEE JOINT REPLACEMENT SURGERY: ICD-10-CM

## 2024-07-26 DIAGNOSIS — M25.662 DECREASED RANGE OF MOTION (ROM) OF LEFT KNEE: ICD-10-CM

## 2024-07-26 DIAGNOSIS — Z96.652 AFTERCARE FOLLOWING LEFT KNEE JOINT REPLACEMENT SURGERY: ICD-10-CM

## 2024-07-26 PROCEDURE — 97110 THERAPEUTIC EXERCISES: CPT | Mod: GP

## 2024-07-26 PROCEDURE — 97140 MANUAL THERAPY 1/> REGIONS: CPT | Mod: GP

## 2024-07-26 NOTE — PROGRESS NOTES
"Physical Therapy    Physical Therapy Treatment    Patient Name: Kim Yi  MRN: [id number]  Today's Date: 7/26/2024  Visit: 4/20  Insurance: Medical [place]  Authorization: 7/12/24-10/12/24  Time Entry:   Time Calculation  Start Time: 0920  Stop Time: 1005  Time Calculation (min): 45 min  PT Therapeutic Procedures Time Entry  Manual Therapy Time Entry: 15  Therapeutic Exercise Time Entry: 30       Assessment:   Patient tolerated all therapeutic exercises well with mild L knee soreness with knee extension stretch and good response to passive knee flexion stretch in sitting. She continues to have pain at her incision site and c/o \"hard\" feeling of the lateral aspect. She continues to demonstrate decreased WB on L LE throughout stance phase as well as decreased step length which improved with cues. Added prone TKE to focus on terminal knee extension as pt with c/o slight R knee buckling with heel-toe ambulation. L knee AROM is 7 to 112 degrees.     Plan: Continue with POC. Standing TKE    Current Problem  Problem List Items Addressed This Visit    None  Visit Diagnoses         Codes    Decreased range of motion (ROM) of left knee     M25.662    Decreased mobility     R26.89    Aftercare following left knee joint replacement surgery     Z47.1, Z96.652          General  Knee pain 6/10 at worst last 2 days  Still doing steps one at a time  Limited to 15-20 minutes walking in store due to discomfort  Left TKA on 6/21/24    Subjective  Patient reports 5/10 L knee pain today at her incision. She states all the exercises are okay. She states that she can't walk far as her leg starts hurting.    Precautions: Fall risk       Objective     Treatments:  SciFit stepper 7 minutes  Step stretch for L knee flexion. 10s x 10  Gastroc stretch on step. 30s x 3  L hamstring stretch on step. 30s x 3  Supine quad sets 5s x 20  Supine heel slides with strap x 15  Supine L SLR. 2 x 10  Prone quad stretch. 30s x 3  Prone TKE L x " 10  Reviewed ambulation with symmetrical WB and heel to toe off    Manual Therapy:  L knee scar massage   Manual knee extension stretching in supine  Manual knee flexion stretch in seated      OP EDUCATION: reviewed HEP        Goals:  Active       PT Problem       knee ROM       Start:  07/12/24    Expected End:  10/10/24       Left knee PROM: extension > -4, flexion > 114  Left knee AROM: extension > -5, flexion > 110         gait       Start:  07/12/24    Expected End:  10/10/24       Patient will ambulate with improved left knee flexion, decreased lateral trunk lean and antalgia, increased kimberlyn and gait speed.   10 meter walk < 16 secs with or without cane         outcome measures       Start:  07/12/24    Expected End:  10/10/24       LEFS > 44  5 times sit to stand < 10 secs without UEs         strength       Start:  07/12/24    Expected End:  10/10/24       Right knee strength 5/5 to enhance functional mobility         function       Start:  07/12/24    Expected End:  10/10/24       Patient will demonstrate adequate strength and knee control to perform reciprocal stair negotiation, squats, and all transfers         Patient Stated Goal 1       Start:  07/12/24    Expected End:  10/10/24       Return to driving, return to work, and walk community distances.

## 2024-07-29 ENCOUNTER — TREATMENT (OUTPATIENT)
Dept: PHYSICAL THERAPY | Facility: CLINIC | Age: 59
End: 2024-07-29
Payer: COMMERCIAL

## 2024-07-29 DIAGNOSIS — Z96.652 AFTERCARE FOLLOWING LEFT KNEE JOINT REPLACEMENT SURGERY: ICD-10-CM

## 2024-07-29 DIAGNOSIS — Z47.1 AFTERCARE FOLLOWING LEFT KNEE JOINT REPLACEMENT SURGERY: ICD-10-CM

## 2024-07-29 DIAGNOSIS — R26.89 DECREASED MOBILITY: ICD-10-CM

## 2024-07-29 DIAGNOSIS — M25.662 DECREASED RANGE OF MOTION (ROM) OF LEFT KNEE: ICD-10-CM

## 2024-07-29 PROCEDURE — 97110 THERAPEUTIC EXERCISES: CPT | Mod: GP

## 2024-07-29 NOTE — PROGRESS NOTES
"Physical Therapy    Physical Therapy Treatment    Patient Name: Kim Yi  MRN: [id number]  Today's Date: 7/29/2024  Visit: 4/20  Insurance: Medical [place]  Authorization: 7/12/24-10/12/24  Time Entry:   Time Calculation  Start Time: 0950  Stop Time: 1032  Time Calculation (min): 42 min  PT Therapeutic Procedures Time Entry  Therapeutic Exercise Time Entry: 42       Assessment:   Patient tolerated all therapeutic exercises well without increase in L knee pain and some improvement in L knee stiffness. Added step ups, step downs, standing TKE with resistance, and split stance STS for functional LE strengthening wolfgang quad strength. Incorporated anterior wt shifting with stepping over foam roller to improve gait mechanics as well had strength. Patient required visual cues and verbal cues to decrease L hip circumduction with step ups and stepping over smartroller. Patient ambulated without cane demonstrating fair heel-toe pattern on L LE and good symmetrical WB.     Plan: Continue with POC. Standing TKE    Current Problem  Problem List Items Addressed This Visit    None  Visit Diagnoses         Codes    Decreased range of motion (ROM) of left knee     M25.662    Decreased mobility     R26.89    Aftercare following left knee joint replacement surgery     Z47.1, Z96.652          General  Knee pain 6/10 at worst last 2 days  Still doing steps one at a time  Limited to 15-20 minutes walking in store due to discomfort  Left TKA on 6/21/24    Subjective  Patient reports 2/10 L knee stiffness today. She states that she walked this weekend without cane except for hills.    Precautions: Fall risk       Objective     Treatments:  SciFit Bike 7 seat 6 and 4 x 7 minutes for knee ROM and warm-up  Step stretch for L knee flexion. 10s x 10  Gastroc stretch on step. 30s x 3  L hamstring stretch on step. 30s x 3  Step ups at 6\" step with BL HR 2 x 10 L/R  *cues for ant wt shift and no L hip circumduction  Step down at 4\" step with HR " 2 x 10 L  Functional STS x 15 *cues for symmetrical WB  Split stance STS (L) x 15  Sanding TKE with red TB 2 x 10 L    In // bars with BL UE support:  - L LE anterior wt shift stepping over smartroller *cues for driving L knee up and do heel-toe x 30 with mirror for visual feedback  - Functional ambulation x 2 passes d/b with cues for symmetrical and Heel to toe ambulation    OP EDUCATION: See treatment section       Goals:  Active       PT Problem       knee ROM       Start:  07/12/24    Expected End:  10/10/24       Left knee PROM: extension > -4, flexion > 114  Left knee AROM: extension > -5, flexion > 110         gait       Start:  07/12/24    Expected End:  10/10/24       Patient will ambulate with improved left knee flexion, decreased lateral trunk lean and antalgia, increased kimberlyn and gait speed.   10 meter walk < 16 secs with or without cane         outcome measures       Start:  07/12/24    Expected End:  10/10/24       LEFS > 44  5 times sit to stand < 10 secs without UEs         strength       Start:  07/12/24    Expected End:  10/10/24       Right knee strength 5/5 to enhance functional mobility         function       Start:  07/12/24    Expected End:  10/10/24       Patient will demonstrate adequate strength and knee control to perform reciprocal stair negotiation, squats, and all transfers         Patient Stated Goal 1       Start:  07/12/24    Expected End:  10/10/24       Return to driving, return to work, and walk community distances.

## 2024-07-30 ENCOUNTER — OFFICE VISIT (OUTPATIENT)
Dept: ORTHOPEDIC SURGERY | Facility: CLINIC | Age: 59
End: 2024-07-30
Payer: COMMERCIAL

## 2024-07-30 ENCOUNTER — HOSPITAL ENCOUNTER (OUTPATIENT)
Dept: RADIOLOGY | Facility: CLINIC | Age: 59
Discharge: HOME | End: 2024-07-30
Payer: COMMERCIAL

## 2024-07-30 DIAGNOSIS — Z96.652 S/P TOTAL KNEE ARTHROPLASTY, LEFT: ICD-10-CM

## 2024-07-30 PROCEDURE — 4010F ACE/ARB THERAPY RXD/TAKEN: CPT | Performed by: STUDENT IN AN ORGANIZED HEALTH CARE EDUCATION/TRAINING PROGRAM

## 2024-07-30 PROCEDURE — 1036F TOBACCO NON-USER: CPT | Performed by: STUDENT IN AN ORGANIZED HEALTH CARE EDUCATION/TRAINING PROGRAM

## 2024-07-30 PROCEDURE — 99211 OFF/OP EST MAY X REQ PHY/QHP: CPT | Performed by: STUDENT IN AN ORGANIZED HEALTH CARE EDUCATION/TRAINING PROGRAM

## 2024-07-30 PROCEDURE — 73562 X-RAY EXAM OF KNEE 3: CPT | Mod: LT

## 2024-07-30 PROCEDURE — 73562 X-RAY EXAM OF KNEE 3: CPT | Mod: LEFT SIDE | Performed by: RADIOLOGY

## 2024-07-30 PROCEDURE — 3044F HG A1C LEVEL LT 7.0%: CPT | Performed by: STUDENT IN AN ORGANIZED HEALTH CARE EDUCATION/TRAINING PROGRAM

## 2024-07-30 NOTE — PROGRESS NOTES
Diagnosis: End stage osteoarthritis Left Knee  Procedure Performed: Left Total Knee Arthroplasty   Date of Surgery: June 21, 2024    Subjective:  Kim Yi is here for regularly scheduled follow-up after the above procedure. The patient has no specific complaints other than occasional discomfort. The patient is using Tylenol for pain control.  She has been working with outpatient physical therapy.  She is progressing well.  She is ambulatory without assistive device.  She has occasional pain after physical therapy but is largely pain-free.  She denies any drainage from surgical incision.  The patient denies: fevers, chills, incisional drainage, joint instability, chest or calf pain, shortness of breath, and night sweats. There are no complaints of numbness or tingling in the operative extremity.  No falls or trauma.    Her right knee is more symptomatic.  She is having increasing anterior knee pain.  She is planning to go to [place] with her daughter for her 16[age] birthday in the coming months.    There has been no interval change in this patient's past medical, surgical, medications, allergies, family history or social history since the most recent visit to a provider within our department.  14 point review of systems was performed, reviewed, and negative except for pertinent positives documented in the history of present illness.    Physical Examination:   General: Patient is alert and oriented x 3 and appears comfortable.  Gait: Patient ambulates with slight antalgic gait.  Wound: Incision is well healed. There is mild warmth and effusion about the knee, both consistent with the normal post-operative healing. There is no erythema, incisional drainage, fluctuance, or suture abscess.   Knee: ROM 5-110.  Stable to varus and valgus stress at 0 and 30 degrees.   Patella tracks midline  Expected post operative swelling and tenderness   Calf: No swelling or tenderness  Able to dorsi-flex and plantar-flex the ankle  with appropriate strength. Able to wiggle all toes.   The foot is warm and well perfused with palpable pulses.   Sensation is intact to light touch.     Radiographs: None today    Assessment and Plan: Kim Yi is progressing well approximately 5.5 weeks s/p left total knee arthroplasty. I am satisfied with the patient's recovery to this point.     1. A thorough discussion was had with the patient concerning the postoperative course and the patient is in agreement with the plan.  2. Continued physical therapy with gait training to improve strength and stamina. Progress off support as tolerated.   3.  Over-the-counter medications as needed for pain control.  Continue icing.  4. Reviewed the need for prophylactic antibiotics prior to any dental or other invasive procedures.  5. No swimming or tub bathing until 3 months post op  6. Follow-up in 9 months with radiographs or sooner if there are any concerns.      This office note was dictated using Dragon voice to text software and was not proofread for spelling or grammatical errors     This document is complete and the patient is ready for discharge.

## 2024-08-01 ENCOUNTER — TREATMENT (OUTPATIENT)
Dept: PHYSICAL THERAPY | Facility: CLINIC | Age: 59
End: 2024-08-01
Payer: COMMERCIAL

## 2024-08-01 DIAGNOSIS — R26.89 DECREASED MOBILITY: ICD-10-CM

## 2024-08-01 DIAGNOSIS — M25.662 DECREASED RANGE OF MOTION (ROM) OF LEFT KNEE: ICD-10-CM

## 2024-08-01 DIAGNOSIS — Z96.652 AFTERCARE FOLLOWING LEFT KNEE JOINT REPLACEMENT SURGERY: ICD-10-CM

## 2024-08-01 DIAGNOSIS — Z47.1 AFTERCARE FOLLOWING LEFT KNEE JOINT REPLACEMENT SURGERY: ICD-10-CM

## 2024-08-01 PROCEDURE — 97110 THERAPEUTIC EXERCISES: CPT | Mod: GP

## 2024-08-01 NOTE — PROGRESS NOTES
"Physical Therapy    Physical Therapy Treatment    Patient Name: Kim Yi  MRN: [id number]  Today's Date: 8/1/2024  Time Entry:   Time Calculation  Start Time: 0950  Stop Time: 1032  Time Calculation (min): 42 min  PT Therapeutic Procedures Time Entry  Therapeutic Exercise Time Entry: 42    Visit: 5/20  Insurance: Medical [place]  Authorization: 7/12/24-10/12/24    Assessment:   Patient tolerated all therapeutic exercises well without adverse effect or L knee pain. Added lateral step ups to strengthen proximal LE muscles and leg extension machine with 10lbs for quad strengthening. Patient completed functional stair negotiation reciprocally with BL HR demonstrating L hip circumduction requiring max cues to focus on hip flexion with step up. She continues to ambulate with antalgic gait pattern demonstrating decreased stance on L LE. She reports decreased pain overall on L knee with activities.    Plan: Continue with POC. Standing TKE, hurdles, HS curl    Current Problem  Problem List Items Addressed This Visit    None  Visit Diagnoses         Codes    Decreased range of motion (ROM) of left knee     M25.662    Decreased mobility     R26.89    Aftercare following left knee joint replacement surgery     Z47.1, Z96.652            General: Left TKA on 6/21/24    Subjective  Patient denies L knee pain today, 0/10. She states that she had an appt with MD yesterday and he was satisfied with her progress in PT thus far.    Precautions: Fall risk    Objective     Treatments:  SciFit Bike seat 6 x 5 min and seat 4 x 4 minutes for knee ROM and warm-up  Step stretch for L knee flexion. 10s x 15  Gastroc stretch on slant board. 30s x 3  L hamstring stretch on step. 30s x 3  Step ups at 6\" step with BL HR 2 x 15 L/R  *cues for decreased UE support  Functional stairs negotiation at 6\" stairs with BL HR and reciprocal pattern x 5 reps *VC to decrease L hip circumduction with up  Lateral step ups at 6\" step with BL HR x 10 " "R/L  Step down at 4\" step with HR 2 x 10 L  Split stance STS (L) x 20  Matrix leg extension, 10lb 2 x 10 with 3 sec holds      OP EDUCATION: See treatment section       Goals:  Active       PT Problem       knee ROM       Start:  07/12/24    Expected End:  10/10/24       Left knee PROM: extension > -4, flexion > 114  Left knee AROM: extension > -5, flexion > 110         gait       Start:  07/12/24    Expected End:  10/10/24       Patient will ambulate with improved left knee flexion, decreased lateral trunk lean and antalgia, increased kimberlyn and gait speed.   10 meter walk < 16 secs with or without cane         outcome measures       Start:  07/12/24    Expected End:  10/10/24       LEFS > 44  5 times sit to stand < 10 secs without UEs         strength       Start:  07/12/24    Expected End:  10/10/24       Right knee strength 5/5 to enhance functional mobility         function       Start:  07/12/24    Expected End:  10/10/24       Patient will demonstrate adequate strength and knee control to perform reciprocal stair negotiation, squats, and all transfers         Patient Stated Goal 1       Start:  07/12/24    Expected End:  10/10/24       Return to driving, return to work, and walk community distances.             "

## 2024-08-05 DIAGNOSIS — E11.69 TYPE 2 DIABETES MELLITUS WITH OTHER SPECIFIED COMPLICATION, WITHOUT LONG-TERM CURRENT USE OF INSULIN (MULTI): ICD-10-CM

## 2024-08-05 RX ORDER — SEMAGLUTIDE 1.34 MG/ML
1 INJECTION, SOLUTION SUBCUTANEOUS
Qty: 3 ML | Refills: 2 | Status: SHIPPED | OUTPATIENT
Start: 2024-08-05

## 2024-08-06 ENCOUNTER — TREATMENT (OUTPATIENT)
Dept: PHYSICAL THERAPY | Facility: CLINIC | Age: 59
End: 2024-08-06
Payer: COMMERCIAL

## 2024-08-06 DIAGNOSIS — M25.662 DECREASED RANGE OF MOTION (ROM) OF LEFT KNEE: ICD-10-CM

## 2024-08-06 DIAGNOSIS — Z47.1 AFTERCARE FOLLOWING LEFT KNEE JOINT REPLACEMENT SURGERY: ICD-10-CM

## 2024-08-06 DIAGNOSIS — Z96.652 AFTERCARE FOLLOWING LEFT KNEE JOINT REPLACEMENT SURGERY: ICD-10-CM

## 2024-08-06 DIAGNOSIS — R26.89 DECREASED MOBILITY: ICD-10-CM

## 2024-08-06 PROCEDURE — 97110 THERAPEUTIC EXERCISES: CPT | Mod: GP,CQ

## 2024-08-06 ASSESSMENT — PAIN - FUNCTIONAL ASSESSMENT: PAIN_FUNCTIONAL_ASSESSMENT: 0-10

## 2024-08-06 ASSESSMENT — PAIN SCALES - GENERAL: PAINLEVEL_OUTOF10: 0 - NO PAIN

## 2024-08-06 NOTE — PROGRESS NOTES
"Physical Therapy    Physical Therapy Treatment    Patient Name: Kim Yi  MRN: [id number]  Today's Date: 8/6/2024  Time Entry:   Time Calculation  Start Time: 0845  Stop Time: 0930  Time Calculation (min): 45 min  PT Therapeutic Procedures Time Entry  Therapeutic Exercise Time Entry: 45  Visit: 7/20  Insurance: Medical [place]  Authorization: 7/12/24-10/12/24    Assessment:   Good demonstration of exercise carry over from previous sessions and independence with repeated exercises. Intensity of stretch felt during knee flexion exercises on medial side, rates on pain scale around a 3/10. No other reported increases this session. Measurements taken today, flexion at 110 and extensions at -3/-4. Feeling less tightness in the knee when leaving.     Plan: Continue with POC. Standing TKE, hurdles, HS curl    Current Problem  Problem List Items Addressed This Visit    None  Visit Diagnoses         Codes    Decreased range of motion (ROM) of left knee     M25.662    Decreased mobility     R26.89    Aftercare following left knee joint replacement surgery     Z47.1, Z96.652          General: Left TKA on 6/21/24    Subjective   No pain right now but I have been sore since Friday, I did a large amount of walking that day  Performing my HEP at least once a day  I still have difficulty when going up the stairs  Unsure of cause but the left knee danelle occasionally  No further updates at this time    Precautions: Fall risk    Objective     Treatments:  SciFit Bike seat 6 x 5 min and seat 4 x 4 minutes for knee ROM and warm-up  Step stretch for L knee flexion. 10s x 15  Gastroc stretch on slant board. 30s x 3  L hamstring stretch on step. 30s x 3  Step ups at 6\" step with BL HR 2 x 15 L/R  *cues for decreased UE support  Functional stairs negotiation at 6\" stairs with BL HR and reciprocal pattern x 5 reps (*VC to decrease L hip circumduction with up)  Heel toe walking in // bars x 3  L SLS 5 x 10 sec - L SLS with right 3 way " hip x 5  Matrix leg extension, 10lb 2 x 10 with 3 sec holds  Matrix hamstring curl 15lb 2 x 10  Lateral walking with RTB under feet white tape x 2  Heel slides x 20  Measurements taken     OP EDUCATION: See treatment section     Goals:  Active       PT Problem       knee ROM       Start:  07/12/24    Expected End:  10/10/24       Left knee PROM: extension > -4, flexion > 114  Left knee AROM: extension > -5, flexion > 110         gait       Start:  07/12/24    Expected End:  10/10/24       Patient will ambulate with improved left knee flexion, decreased lateral trunk lean and antalgia, increased kimberlyn and gait speed.   10 meter walk < 16 secs with or without cane         outcome measures       Start:  07/12/24    Expected End:  10/10/24       LEFS > 44  5 times sit to stand < 10 secs without UEs         strength       Start:  07/12/24    Expected End:  10/10/24       Right knee strength 5/5 to enhance functional mobility         function       Start:  07/12/24    Expected End:  10/10/24       Patient will demonstrate adequate strength and knee control to perform reciprocal stair negotiation, squats, and all transfers         Patient Stated Goal 1       Start:  07/12/24    Expected End:  10/10/24       Return to driving, return to work, and walk community distances.

## 2024-08-08 ENCOUNTER — TREATMENT (OUTPATIENT)
Dept: PHYSICAL THERAPY | Facility: CLINIC | Age: 59
End: 2024-08-08
Payer: COMMERCIAL

## 2024-08-08 DIAGNOSIS — R26.89 DECREASED MOBILITY: ICD-10-CM

## 2024-08-08 DIAGNOSIS — Z96.652 AFTERCARE FOLLOWING LEFT KNEE JOINT REPLACEMENT SURGERY: ICD-10-CM

## 2024-08-08 DIAGNOSIS — M17.12 PRIMARY OSTEOARTHRITIS OF LEFT KNEE: ICD-10-CM

## 2024-08-08 DIAGNOSIS — M25.662 DECREASED RANGE OF MOTION (ROM) OF LEFT KNEE: Primary | ICD-10-CM

## 2024-08-08 DIAGNOSIS — Z47.1 AFTERCARE FOLLOWING LEFT KNEE JOINT REPLACEMENT SURGERY: ICD-10-CM

## 2024-08-08 PROCEDURE — 97110 THERAPEUTIC EXERCISES: CPT | Mod: GP,CQ

## 2024-08-08 ASSESSMENT — PAIN SCALES - GENERAL: PAINLEVEL_OUTOF10: 0 - NO PAIN

## 2024-08-08 ASSESSMENT — PAIN - FUNCTIONAL ASSESSMENT: PAIN_FUNCTIONAL_ASSESSMENT: 0-10

## 2024-08-08 NOTE — PROGRESS NOTES
"Physical Therapy    Physical Therapy Treatment    Patient Name: Kim Yi  MRN: [id number]  Today's Date: 8/8/2024  Time Entry:   Time Calculation  Start Time: 0800  Stop Time: 0845  Time Calculation (min): 45 min  PT Therapeutic Procedures Time Entry  Therapeutic Exercise Time Entry: 45  Visit: 8/20  Insurance: [employer] [place]  Authorization: 7/12/24-10/12/24    Assessment:   Strength and Rom continuing to show progression. Continues to show understanding/independence of her exercises and show determination to improve. Completes today's exercises without major pain increases, moderate intensity felt behind the knee with stretching. Good tolerance with overpressure applied by therapist. Rest breaks needed between sets of step up exercises. No other issues noted, progressing well.     Plan: Continue with POC. Standing TKE, hurdles, HS curl    Current Problem  Problem List Items Addressed This Visit    None  Visit Diagnoses         Codes    Decreased range of motion (ROM) of left knee    -  Primary M25.662    Decreased mobility     R26.89    Aftercare following left knee joint replacement surgery     Z47.1, Z96.652    Primary osteoarthritis of left knee     M17.12          General: Left TKA on 6/21/24    Subjective   No pain, tight band sensation around the left knee  HEP completed once daily  Performing self scar tissue massage  No other updates/complaints at this time    Precautions: Fall risk    Objective     Treatments:  SciFit Bike seat 6 x 4 min and seat 4 x 4 minutes for knee ROM and warm-up  Step stretch for L knee flexion. 10s x 15  L hamstring stretch on step with extension over pressure 30s x 3  Calf stretch on step with quad set for knee extensions 30 x 3  Forward 6\" step ups x 15  Lateral 6\" step ups x 15  Lateral walking with RTB under feet white tape x 2  L SLS 5 x 10 sec - L SLS with right 3 way hip x 5  Matrix leg extension, 10lb 2 x 10 with 3 sec holds  Matrix hamstring curl 15lb 2 x 10  Heel " slides x 20    OP EDUCATION: See treatment section     Goals:  Active       PT Problem       knee ROM       Start:  07/12/24    Expected End:  10/10/24       Left knee PROM: extension > -4, flexion > 114  Left knee AROM: extension > -5, flexion > 110         gait       Start:  07/12/24    Expected End:  10/10/24       Patient will ambulate with improved left knee flexion, decreased lateral trunk lean and antalgia, increased kimberlyn and gait speed.   10 meter walk < 16 secs with or without cane         outcome measures       Start:  07/12/24    Expected End:  10/10/24       LEFS > 44  5 times sit to stand < 10 secs without UEs         strength       Start:  07/12/24    Expected End:  10/10/24       Right knee strength 5/5 to enhance functional mobility         function       Start:  07/12/24    Expected End:  10/10/24       Patient will demonstrate adequate strength and knee control to perform reciprocal stair negotiation, squats, and all transfers         Patient Stated Goal 1       Start:  07/12/24    Expected End:  10/10/24       Return to driving, return to work, and walk community distances.

## 2024-08-13 ENCOUNTER — TREATMENT (OUTPATIENT)
Dept: PHYSICAL THERAPY | Facility: CLINIC | Age: 59
End: 2024-08-13
Payer: COMMERCIAL

## 2024-08-13 DIAGNOSIS — M25.662 DECREASED RANGE OF MOTION (ROM) OF LEFT KNEE: ICD-10-CM

## 2024-08-13 DIAGNOSIS — Z47.1 AFTERCARE FOLLOWING LEFT KNEE JOINT REPLACEMENT SURGERY: ICD-10-CM

## 2024-08-13 DIAGNOSIS — R26.89 DECREASED MOBILITY: ICD-10-CM

## 2024-08-13 DIAGNOSIS — Z96.652 AFTERCARE FOLLOWING LEFT KNEE JOINT REPLACEMENT SURGERY: ICD-10-CM

## 2024-08-13 PROCEDURE — 97110 THERAPEUTIC EXERCISES: CPT | Mod: GP,CQ

## 2024-08-13 ASSESSMENT — PAIN - FUNCTIONAL ASSESSMENT: PAIN_FUNCTIONAL_ASSESSMENT: 0-10

## 2024-08-13 ASSESSMENT — PAIN SCALES - GENERAL: PAINLEVEL_OUTOF10: 0 - NO PAIN

## 2024-08-13 NOTE — PROGRESS NOTES
Physical Therapy    Physical Therapy Treatment    Patient Name: Kim iY  MRN: [id number]  Today's Date: 8/13/2024  Time Entry:   Time Calculation  Start Time: 0850  Stop Time: 0930  Time Calculation (min): 40 min  PT Therapeutic Procedures Time Entry  Therapeutic Exercise Time Entry: 40  Visit: 9/20  Insurance: [employer] [place]  Authorization: 7/12/24-10/12/24    Assessment:   Patient with initial soreness/stiffness for the session, gradual reduction, back and forth on bike until loosened. Addition of three way hip strengthening, moderately challenging to the patient. Rest break taken after lateral walking. Good tolerance for her applied over pressure during heel slides. Mini squats required cues on form. Finishes this treatment doing well, no other reported complaints today.     Plan: Continue with POC. Standing TKE, hurdles, HS curl  OP PT Plan  Treatment/Interventions: Cryotherapy, Education/ Instruction, Gait training, Manual therapy, Therapeutic exercises, Therapeutic activities, Neuromuscular re-education  PT Plan: Skilled PT  PT Frequency: 2 times per week  Duration: 10 visits  Onset Date: 06/21/24  Rehab Potential: Good  Plan of Care Agreement: Patient     Current Problem  Problem List Items Addressed This Visit    None  Visit Diagnoses         Codes    Decreased range of motion (ROM) of left knee     M25.662    Decreased mobility     R26.89    Aftercare following left knee joint replacement surgery     Z47.1, Z96.652          General: Left TKA on 6/21/24    Subjective   I am a little stiff this mornings  I sat a lot yesterday   Completed my HEP four times since last visit and increased amounts of walking  No other concerns or updates at this time    Precautions: Fall risk    Objective     Treatments:  SciFit Bike seat 6 x 4 min and seat 4 x 4 minutes for knee ROM and warm-up  Step stretch for L knee flexion. 10s x 15  L hamstring stretch on step with extension over pressure 30s x 3  Calf stretch on  "step with quad set for knee extensions 30 x 3  GTB around ankles lateral walking white tape x 2  GTB around ankles standing three way hip x 15 each  Mini squat in // bars x 10  Heel slides 20 x 5 sec hold  Heel prop with 20 x 5 sec quad set  Matrix leg extension, 10lb 2 x 10 with 3 sec holds  Matrix hamstring curl 15lb 2 x 10  L SLS 5 x 10 sec - L SLS with right 3 way hip x 5    (NT)  Forward 6\" step ups x 15  Lateral 6\" step ups x 15    OP EDUCATION: See treatment section     Goals:  Active       PT Problem       knee ROM       Start:  07/12/24    Expected End:  10/10/24       Left knee PROM: extension > -4, flexion > 114  Left knee AROM: extension > -5, flexion > 110         gait       Start:  07/12/24    Expected End:  10/10/24       Patient will ambulate with improved left knee flexion, decreased lateral trunk lean and antalgia, increased kimberlyn and gait speed.   10 meter walk < 16 secs with or without cane         outcome measures       Start:  07/12/24    Expected End:  10/10/24       LEFS > 44  5 times sit to stand < 10 secs without UEs         strength       Start:  07/12/24    Expected End:  10/10/24       Right knee strength 5/5 to enhance functional mobility         function       Start:  07/12/24    Expected End:  10/10/24       Patient will demonstrate adequate strength and knee control to perform reciprocal stair negotiation, squats, and all transfers         Patient Stated Goal 1       Start:  07/12/24    Expected End:  10/10/24       Return to driving, return to work, and walk community distances.             "

## 2024-08-15 ENCOUNTER — TREATMENT (OUTPATIENT)
Dept: PHYSICAL THERAPY | Facility: CLINIC | Age: 59
End: 2024-08-15
Payer: COMMERCIAL

## 2024-08-15 DIAGNOSIS — Z47.1 AFTERCARE FOLLOWING LEFT KNEE JOINT REPLACEMENT SURGERY: ICD-10-CM

## 2024-08-15 DIAGNOSIS — M25.662 DECREASED RANGE OF MOTION (ROM) OF LEFT KNEE: Primary | ICD-10-CM

## 2024-08-15 DIAGNOSIS — Z96.652 AFTERCARE FOLLOWING LEFT KNEE JOINT REPLACEMENT SURGERY: ICD-10-CM

## 2024-08-15 DIAGNOSIS — R26.89 DECREASED MOBILITY: ICD-10-CM

## 2024-08-15 PROCEDURE — 97110 THERAPEUTIC EXERCISES: CPT | Mod: GP | Performed by: PHYSICAL THERAPIST

## 2024-08-15 NOTE — PROGRESS NOTES
"Physical Therapy    Physical Therapy Treatment and Discharge    Patient Name: Kim Yi  MRN: [id number]  Today's Date: 8/15/2024  Time Entry:   Time Calculation  Start Time: 0800  Stop Time: 0845  Time Calculation (min): 45 min  PT Therapeutic Procedures Time Entry  Therapeutic Exercise Time Entry: 45  Visit: 10/20  Insurance: [employer] [place]  Authorization: 7/12/24-10/12/24    Assessment:   Patient is ready and content with discharge from PT today.  She is doing very well s/p Left TKA on 6/21/24.  Left knee AAROM is 0 to 117. AROM is 0 to 115 deg.. Left knee strength is 5/5. She ambulates indoors and outdoors without cane and able to ambulate up/down stairs reciprocally without major compensation. She agrees to continue her HEP twice a day for 1 more month and then wean from the daily HEP as able. We reviewed the most pertinent stretches and strengthening to continue and she voices understanding.     Plan: Discharge from PT today.     Current Problem  Problem List Items Addressed This Visit    None  Visit Diagnoses         Codes    Decreased range of motion (ROM) of left knee    -  Primary M25.662    Decreased mobility     R26.89    Aftercare following left knee joint replacement surgery     Z47.1, Z96.652          General: Left TKA on 6/21/24    Subjective   Overall her left knee is doing well. She c/o stiffness in bed and first thing in the morning. She is doing her exercises once a day.   She was able to walk up an \"embankment\" incline without a cane recently without issues.     Precautions: Fall risk    Objective   5 times sit to stand = 8.28 secs without Ues  10 meter walk = 10.52 secs   LEFS score improved to 58/80    Treatments:  SciFit Bike seat 6 x 5 min and   Step stretch for L knee flexion. 10s x 15  L hamstring stretch on step with extension over pressure 30s x 3  Reciprocal stair ambulation training with one railing. X 3 minutes  Sit to stands without Ues. 2 x 10  Heel slides 20 x 5 sec hold  Heel " prop with 20 x 5 sec quad set  Prone quad stretch with strap 30s x 3  Prone knee hang x 5 minutes    OP EDUCATION: We reviewed her final HEP in great detail today and she voices and demonstrates understanding.      Goals:  Resolved       PT Problem       knee ROM (Met)       Start:  07/12/24    Expected End:  10/10/24    Resolved:  08/15/24    Left knee PROM: extension > -4, flexion > 114  Left knee AROM: extension > -5, flexion > 110         gait (Met)       Start:  07/12/24    Expected End:  10/10/24    Resolved:  08/15/24    Patient will ambulate with improved left knee flexion, decreased lateral trunk lean and antalgia, increased kimberlyn and gait speed.   10 meter walk < 16 secs with or without cane         outcome measures (Met)       Start:  07/12/24    Expected End:  10/10/24    Resolved:  08/15/24    LEFS > 44  5 times sit to stand < 10 secs without UEs         strength (Met)       Start:  07/12/24    Expected End:  10/10/24    Resolved:  08/15/24    Right knee strength 5/5 to enhance functional mobility         function (Met)       Start:  07/12/24    Expected End:  10/10/24    Resolved:  08/15/24    Patient will demonstrate adequate strength and knee control to perform reciprocal stair negotiation, squats, and all transfers         Patient Stated Goal 1 (Met)       Start:  07/12/24    Expected End:  10/10/24    Resolved:  08/15/24    Return to driving, return to work, and walk community distances.

## 2024-08-16 ENCOUNTER — APPOINTMENT (OUTPATIENT)
Dept: PHYSICAL THERAPY | Facility: CLINIC | Age: 59
End: 2024-08-16
Payer: COMMERCIAL

## 2024-08-21 ENCOUNTER — APPOINTMENT (OUTPATIENT)
Dept: RADIOLOGY | Facility: CLINIC | Age: 59
End: 2024-08-21
Payer: COMMERCIAL

## 2024-08-21 DIAGNOSIS — Z12.31 SCREENING MAMMOGRAM FOR BREAST CANCER: ICD-10-CM

## 2024-09-17 ENCOUNTER — LAB (OUTPATIENT)
Dept: LAB | Facility: LAB | Age: 59
End: 2024-09-17
Payer: COMMERCIAL

## 2024-09-17 ENCOUNTER — APPOINTMENT (OUTPATIENT)
Dept: PRIMARY CARE | Facility: CLINIC | Age: 59
End: 2024-09-17
Payer: COMMERCIAL

## 2024-09-17 VITALS
TEMPERATURE: 97.3 F | HEART RATE: 71 BPM | BODY MASS INDEX: 40.64 KG/M2 | DIASTOLIC BLOOD PRESSURE: 75 MMHG | SYSTOLIC BLOOD PRESSURE: 134 MMHG | WEIGHT: 207 LBS

## 2024-09-17 DIAGNOSIS — E11.9 TYPE 2 DIABETES MELLITUS WITHOUT COMPLICATIONS (MULTI): ICD-10-CM

## 2024-09-17 DIAGNOSIS — E78.5 DYSLIPIDEMIA: ICD-10-CM

## 2024-09-17 DIAGNOSIS — Z96.652 S/P TOTAL KNEE ARTHROPLASTY, LEFT: Primary | ICD-10-CM

## 2024-09-17 DIAGNOSIS — Z12.39 BREAST SCREENING: ICD-10-CM

## 2024-09-17 DIAGNOSIS — Z00.00 ANNUAL PHYSICAL EXAM: ICD-10-CM

## 2024-09-17 DIAGNOSIS — I10 PRIMARY HYPERTENSION: ICD-10-CM

## 2024-09-17 LAB
ALBUMIN SERPL BCP-MCNC: 4.2 G/DL (ref 3.4–5)
ALP SERPL-CCNC: 74 U/L (ref 33–110)
ALT SERPL W P-5'-P-CCNC: 22 U/L (ref 7–45)
ANION GAP SERPL CALC-SCNC: 13 MMOL/L (ref 10–20)
AST SERPL W P-5'-P-CCNC: 25 U/L (ref 9–39)
BILIRUB SERPL-MCNC: 0.5 MG/DL (ref 0–1.2)
BUN SERPL-MCNC: 8 MG/DL (ref 6–23)
CALCIUM SERPL-MCNC: 9.6 MG/DL (ref 8.6–10.6)
CHLORIDE SERPL-SCNC: 106 MMOL/L (ref 98–107)
CHOLEST SERPL-MCNC: 202 MG/DL (ref 0–199)
CHOLESTEROL/HDL RATIO: 5.3
CO2 SERPL-SCNC: 27 MMOL/L (ref 21–32)
CREAT SERPL-MCNC: 0.72 MG/DL (ref 0.5–1.05)
EGFRCR SERPLBLD CKD-EPI 2021: >90 ML/MIN/1.73M*2
GLUCOSE SERPL-MCNC: 82 MG/DL (ref 74–99)
HDLC SERPL-MCNC: 38.2 MG/DL
LDLC SERPL CALC-MCNC: 129 MG/DL
NON HDL CHOLESTEROL: 164 MG/DL (ref 0–149)
POTASSIUM SERPL-SCNC: 4.1 MMOL/L (ref 3.5–5.3)
PROT SERPL-MCNC: 6.9 G/DL (ref 6.4–8.2)
SODIUM SERPL-SCNC: 142 MMOL/L (ref 136–145)
TRIGL SERPL-MCNC: 173 MG/DL (ref 0–149)
VLDL: 35 MG/DL (ref 0–40)

## 2024-09-17 PROCEDURE — 83036 HEMOGLOBIN GLYCOSYLATED A1C: CPT

## 2024-09-17 PROCEDURE — 80053 COMPREHEN METABOLIC PANEL: CPT

## 2024-09-17 PROCEDURE — 80061 LIPID PANEL: CPT

## 2024-09-17 PROCEDURE — 3075F SYST BP GE 130 - 139MM HG: CPT | Performed by: INTERNAL MEDICINE

## 2024-09-17 PROCEDURE — 3078F DIAST BP <80 MM HG: CPT | Performed by: INTERNAL MEDICINE

## 2024-09-17 PROCEDURE — 4010F ACE/ARB THERAPY RXD/TAKEN: CPT | Performed by: INTERNAL MEDICINE

## 2024-09-17 PROCEDURE — 99396 PREV VISIT EST AGE 40-64: CPT | Performed by: INTERNAL MEDICINE

## 2024-09-17 PROCEDURE — 36415 COLL VENOUS BLD VENIPUNCTURE: CPT

## 2024-09-17 PROCEDURE — 3044F HG A1C LEVEL LT 7.0%: CPT | Performed by: INTERNAL MEDICINE

## 2024-09-17 RX ORDER — SEMAGLUTIDE 2.68 MG/ML
2 INJECTION, SOLUTION SUBCUTANEOUS
Qty: 3 ML | Refills: 3 | Status: SHIPPED | OUTPATIENT
Start: 2024-09-17

## 2024-09-17 ASSESSMENT — ENCOUNTER SYMPTOMS
DYSURIA: 0
SHORTNESS OF BREATH: 0
BLOOD IN STOOL: 0
DIARRHEA: 0
PALPITATIONS: 0
NERVOUS/ANXIOUS: 0
CONSTIPATION: 0
DIZZINESS: 0
ARTHRALGIAS: 1
FREQUENCY: 0
COUGH: 0
FATIGUE: 0
NECK PAIN: 0
MYALGIAS: 0
SINUS PAIN: 0
CHILLS: 0
ABDOMINAL PAIN: 0
CONFUSION: 0
HEADACHES: 0
BACK PAIN: 0
SORE THROAT: 0
WEAKNESS: 0
FEVER: 0

## 2024-09-17 NOTE — PROGRESS NOTES
Subjective   Patient ID: Kim Yi is a 59 y.o. female who presents for Follow-up (Pt present today for paperwork. ls) and Annual Exam.    HPI patient is seen today for follow-up.  She has diabetes, hypertension, hyperlipidemia.  She had left knee replacement done about 3 months ago.  She still complains of swelling and soreness.  She completed physical therapy and is trying to do exercises at home.  She denies any chest pain or shortness of breath.  She wants to continue Ozempic for diabetes and weight management.  Current dose 1 mg.    Review of Systems   Constitutional:  Negative for chills, fatigue and fever.   HENT:  Negative for congestion, sinus pain and sore throat.    Respiratory:  Negative for cough and shortness of breath.    Cardiovascular:  Negative for chest pain, palpitations and leg swelling.   Gastrointestinal:  Negative for abdominal pain, blood in stool, constipation and diarrhea.   Genitourinary:  Negative for dysuria and frequency.   Musculoskeletal:  Positive for arthralgias. Negative for back pain, myalgias and neck pain.        Left knee tightness,, stiffness   Neurological:  Negative for dizziness, weakness and headaches.   Psychiatric/Behavioral:  Negative for confusion. The patient is not nervous/anxious.        Objective   /75   Pulse 71   Temp 36.3 °C (97.3 °F)   Wt 93.9 kg (207 lb)   BMI 40.64 kg/m²     Physical Exam  Vitals reviewed.   Constitutional:       General: She is not in acute distress.     Appearance: Normal appearance.   HENT:      Head: Normocephalic and atraumatic.   Cardiovascular:      Rate and Rhythm: Normal rate and regular rhythm.      Pulses: Normal pulses.   Pulmonary:      Effort: Pulmonary effort is normal. No respiratory distress.      Breath sounds: Normal breath sounds.   Abdominal:      General: Bowel sounds are normal. There is no distension.      Tenderness: There is no abdominal tenderness.   Musculoskeletal:         General: No swelling or  tenderness. Normal range of motion.      Comments: Left knee scar healed well  Mild tenderness, edema+   Skin:     General: Skin is warm.   Neurological:      General: No focal deficit present.      Mental Status: She is alert.      Coordination: Coordination normal.      Gait: Gait normal.   Psychiatric:         Mood and Affect: Mood normal.         Behavior: Behavior normal.         Assessment/Plan   Diagnoses and all orders for this visit:  S/P total knee arthroplasty, left  Comments:  Continue with physical therapy  Apply ice for soreness  Dyslipidemia  Comments:  Continue simvastatin and fenofibrate  Check lipid panel  Orders:  -     Lipid Panel; Future  Type 2 diabetes mellitus without complications (Multi)  Comments:  Hba1c ordered  c/w ozempic- 2mg, metformin  Orders:  -     Hemoglobin A1C; Future  -     semaglutide (Ozempic) 2 mg/dose (8 mg/3 mL) pen injector; Inject 2 mg under the skin every 7 days.  -     Comprehensive Metabolic Panel; Future  Primary hypertension  Comments:  Stable  Continue losartan  Orders:  -     Comprehensive Metabolic Panel; Future  Annual physical exam  Comments:  Physical exam completed-paperwork for job completed  Breast screening  Comments:  Screening mammogram ordered  Orders:  -     BI mammo bilateral screening tomosynthesis; Future    Follow-up in 4 months

## 2024-09-18 LAB
EST. AVERAGE GLUCOSE BLD GHB EST-MCNC: 126 MG/DL
HBA1C MFR BLD: 6 %

## 2024-09-21 ENCOUNTER — HOSPITAL ENCOUNTER (OUTPATIENT)
Dept: RADIOLOGY | Facility: CLINIC | Age: 59
Discharge: HOME | End: 2024-09-21
Payer: COMMERCIAL

## 2024-09-21 VITALS — WEIGHT: 207 LBS | BODY MASS INDEX: 39.08 KG/M2 | HEIGHT: 61 IN

## 2024-09-21 DIAGNOSIS — Z12.39 BREAST SCREENING: ICD-10-CM

## 2024-09-21 PROCEDURE — 77063 BREAST TOMOSYNTHESIS BI: CPT | Performed by: RADIOLOGY

## 2024-09-21 PROCEDURE — 77067 SCR MAMMO BI INCL CAD: CPT | Performed by: RADIOLOGY

## 2024-09-21 PROCEDURE — 77067 SCR MAMMO BI INCL CAD: CPT

## 2024-10-28 ENCOUNTER — PATIENT MESSAGE (OUTPATIENT)
Dept: ORTHOPEDIC SURGERY | Facility: CLINIC | Age: 59
End: 2024-10-28
Payer: COMMERCIAL

## 2024-10-29 DIAGNOSIS — I10 PRIMARY HYPERTENSION: ICD-10-CM

## 2024-10-29 DIAGNOSIS — E78.5 DYSLIPIDEMIA: ICD-10-CM

## 2024-10-31 RX ORDER — LOSARTAN POTASSIUM 100 MG/1
100 TABLET ORAL DAILY
Qty: 90 TABLET | Refills: 1 | Status: SHIPPED | OUTPATIENT
Start: 2024-10-31

## 2024-10-31 RX ORDER — FENOFIBRATE 160 MG/1
160 TABLET ORAL DAILY
Qty: 90 TABLET | Refills: 1 | Status: SHIPPED | OUTPATIENT
Start: 2024-10-31

## 2025-02-05 ENCOUNTER — HOSPITAL ENCOUNTER (OUTPATIENT)
Dept: RADIOLOGY | Facility: CLINIC | Age: 60
Discharge: HOME | End: 2025-02-05
Payer: COMMERCIAL

## 2025-02-05 ENCOUNTER — APPOINTMENT (OUTPATIENT)
Dept: ORTHOPEDIC SURGERY | Facility: CLINIC | Age: 60
End: 2025-02-05
Payer: COMMERCIAL

## 2025-02-05 DIAGNOSIS — Z96.652 S/P TOTAL KNEE ARTHROPLASTY, LEFT: ICD-10-CM

## 2025-02-05 PROCEDURE — 73552 X-RAY EXAM OF FEMUR 2/>: CPT | Mod: LT

## 2025-02-05 PROCEDURE — 99213 OFFICE O/P EST LOW 20 MIN: CPT | Performed by: STUDENT IN AN ORGANIZED HEALTH CARE EDUCATION/TRAINING PROGRAM

## 2025-02-05 PROCEDURE — 4010F ACE/ARB THERAPY RXD/TAKEN: CPT | Performed by: STUDENT IN AN ORGANIZED HEALTH CARE EDUCATION/TRAINING PROGRAM

## 2025-02-05 PROCEDURE — G2211 COMPLEX E/M VISIT ADD ON: HCPCS | Performed by: STUDENT IN AN ORGANIZED HEALTH CARE EDUCATION/TRAINING PROGRAM

## 2025-02-05 PROCEDURE — 73562 X-RAY EXAM OF KNEE 3: CPT | Mod: LT

## 2025-02-05 RX ORDER — MELOXICAM 15 MG/1
15 TABLET ORAL DAILY
Qty: 30 TABLET | Refills: 0 | Status: SHIPPED | OUTPATIENT
Start: 2025-02-05 | End: 2025-03-07

## 2025-02-05 NOTE — PROGRESS NOTES
"Diagnosis: End stage osteoarthritis Left Knee  Procedure Performed: Left Total Knee Arthroplasty   Date of Surgery: June 21, 2024    Subjective:  Kim Yi is here for unscheduled follow-up for her left knee. She sustained a syncopal fall in December and where she was putting up Mary lights and woke up on the floor. She knows she fell onto her right side but denies hitting her head. She does not recall the fall. Since then, she has continued to have new anterior thigh pain (feels like there is a \"brick\" in her leg) and occasional instability. She feels like her knee gives out while walking. No other injuries. Prior to this incident she was recovering well. Did not see any providers after her fall. She has completed her outpatient PT regimen.    Physical Examination:   General: Patient is alert and oriented x 3 and appears comfortable.  Gait: Patient ambulates with slight antalgic gait.  Wound: Incision is well healed. There is mild warmth and effusion about the knee, both consistent with the normal post-operative healing. There is no erythema, incisional drainage, fluctuance, or suture abscess.   Knee: ROM 5-110.  Some medial laxity at extension and at 30 degrees of flexion but with firm endpoint  Patella tracks midline  Expected post operative swelling and tenderness   Calf: No swelling or tenderness  Able to dorsi-flex and plantar-flex the ankle with appropriate strength. Able to wiggle all toes.   The foot is warm and well perfused with palpable pulses.   Sensation is intact to light touch.   Straight leg raise intact  5/5 knee extension, no pain with resisted extension  Tenderness to palpation over quad muscle belly without palpable defect    Radiographs:   AP and lateral radiographs of her left knee and femur were obtained demonstrating no osseous injuries or evidence of hardware complications. Alignment appropriate. Appear stable from last postoperative visit.    Assessment and Plan: Kim JOHNSON " Kassidy is approximately 7.5 months out from L TKA and was recovering well postoperatively until a fall 2 months ago.  No concern for complications or fracture clinically or radiographically. Given her symptoms and tenderness associated with her quad, we will proceed with supportive care for tendonitis.    1. A thorough discussion was had with the patient concerning the postoperative course and the patient is in agreement with the plan.  2. Referral to Physical Therapy provided to help with restrengthening her muscles.  3. Prescription for meloxicam sent to help with pain. She was informed to avoid taking other NSAIDs with this.  4. Given the syncopal and unwitnessed nature of her fall, we strongly recommended she follow up with her PCP for possible further workup.    She already has her routine follow-up scheduled in June. We will see her again then with repeat radiographs.

## 2025-02-10 ENCOUNTER — HOSPITAL ENCOUNTER (OUTPATIENT)
Dept: RADIOLOGY | Facility: CLINIC | Age: 60
Discharge: HOME | End: 2025-02-10
Payer: COMMERCIAL

## 2025-02-10 DIAGNOSIS — E78.5 DYSLIPIDEMIA: ICD-10-CM

## 2025-02-10 DIAGNOSIS — Z96.652 S/P TOTAL KNEE ARTHROPLASTY, LEFT: ICD-10-CM

## 2025-02-10 PROCEDURE — 72170 X-RAY EXAM OF PELVIS: CPT

## 2025-02-11 RX ORDER — SIMVASTATIN 20 MG/1
20 TABLET, FILM COATED ORAL NIGHTLY
Qty: 90 TABLET | Refills: 2 | Status: SHIPPED | OUTPATIENT
Start: 2025-02-11

## 2025-02-15 ENCOUNTER — HOSPITAL ENCOUNTER (OUTPATIENT)
Dept: RADIOLOGY | Facility: CLINIC | Age: 60
Discharge: HOME | End: 2025-02-15
Payer: COMMERCIAL

## 2025-02-15 ENCOUNTER — OFFICE VISIT (OUTPATIENT)
Dept: URGENT CARE | Age: 60
End: 2025-02-15
Payer: COMMERCIAL

## 2025-02-15 VITALS
SYSTOLIC BLOOD PRESSURE: 154 MMHG | BODY MASS INDEX: 40.81 KG/M2 | WEIGHT: 216 LBS | DIASTOLIC BLOOD PRESSURE: 80 MMHG | RESPIRATION RATE: 19 BRPM | HEART RATE: 70 BPM | TEMPERATURE: 97.3 F | OXYGEN SATURATION: 97 %

## 2025-02-15 DIAGNOSIS — S46.011A STRAIN OF TENDON OF RIGHT ROTATOR CUFF, INITIAL ENCOUNTER: ICD-10-CM

## 2025-02-15 DIAGNOSIS — S46.011A STRAIN OF TENDON OF RIGHT ROTATOR CUFF, INITIAL ENCOUNTER: Primary | ICD-10-CM

## 2025-02-15 PROCEDURE — 73030 X-RAY EXAM OF SHOULDER: CPT | Mod: RIGHT SIDE | Performed by: RADIOLOGY

## 2025-02-15 PROCEDURE — 73030 X-RAY EXAM OF SHOULDER: CPT | Mod: RT

## 2025-02-15 RX ORDER — TIZANIDINE 4 MG/1
4 TABLET ORAL EVERY 8 HOURS PRN
Qty: 30 TABLET | Refills: 0 | Status: SHIPPED | OUTPATIENT
Start: 2025-02-15 | End: 2025-02-25

## 2025-02-15 RX ORDER — GABAPENTIN 100 MG/1
100 CAPSULE ORAL NIGHTLY
Qty: 10 CAPSULE | Refills: 0 | Status: SHIPPED | OUTPATIENT
Start: 2025-02-15 | End: 2025-02-25

## 2025-02-15 ASSESSMENT — ENCOUNTER SYMPTOMS
CHILLS: 0
NUMBNESS: 0
JOINT SWELLING: 0
NECK STIFFNESS: 0
WOUND: 0
NECK PAIN: 0
WEAKNESS: 0
CARDIOVASCULAR NEGATIVE: 1
FEVER: 0
ARTHRALGIAS: 1
RESPIRATORY NEGATIVE: 1
MYALGIAS: 0
FATIGUE: 0

## 2025-02-15 ASSESSMENT — PATIENT HEALTH QUESTIONNAIRE - PHQ9
2. FEELING DOWN, DEPRESSED OR HOPELESS: NOT AT ALL
SUM OF ALL RESPONSES TO PHQ9 QUESTIONS 1 AND 2: 0
1. LITTLE INTEREST OR PLEASURE IN DOING THINGS: NOT AT ALL

## 2025-02-15 NOTE — PROGRESS NOTES
Subjective   Patient ID: Kim Yi is a 59 y.o. female. They present today with a chief complaint of Injury (Patient here for a fall from 12/25 injury her right arm ).    History of Present Illness  59-year-old, diabetic well controlled , right anterior shoulder pain 7/10, throbbing, constant, injury yesterday while lifting a 40 pack water felt a sudden burning in the anterior shoulder and has continued since then radiating down to her elbow, she took Tylenol apply Bengay has not noticed any improvement.  She is on daily meloxicam due to history of knee replacement so she cannot take additional NSAID.  She denies tingling numbness or weakness.  She had remote fall at home on her right shoulder December 25.  Her symptoms are worse with lifting, sleeping on her right shoulder.  No neck pain.      History provided by:  Patient   used: No    Injury  Associated symptoms: no fatigue, no fever, no myalgias and no rash        Past Medical History  Allergies as of 02/15/2025 - Reviewed 02/15/2025   Allergen Reaction Noted    Other Hives, Other, and Unknown 01/20/2023    Oxycodone-acetaminophen Headache 01/20/2023    Tramadol Headache 01/20/2023       (Not in a hospital admission)       Past Medical History:   Diagnosis Date    Alopecia areata     Scalp    Asthma     during change of seasons    BMI 39.0-39.9,adult     GERD (gastroesophageal reflux disease)     History of transient ischemic attack (TIA)     approx 2011? Had numbness on left side body. No recurrence.    Hx of peptic ulcer     2/2009 EGD: Acute prepyloric gastric ulcer and erosive antral gastritis; pathology + H Pylori    Hyperlipidemia     Hypertension     Osteoarthritis     Pancreatitis (HHS-HCC)     x1 remotely    Type 2 diabetes mellitus without complications (Multi)        Past Surgical History:   Procedure Laterality Date    APPENDECTOMY      CARDIAC CATHETERIZATION  02/19/2016    CONCLUSIONS: Normal coronary arteries in a right  dominant system. Normal left ventricular systolic function.     SECTION, CLASSIC      CHOLECYSTECTOMY      COLONOSCOPY      CORNEAL TRANSPLANT Left     ESOPHAGOSCOPY / EGD      GANGLION CYST EXCISION      wrist and ankle    TOTAL KNEE ARTHROPLASTY Left 2024    VENTRAL HERNIA REPAIR  2007        reports that she has never smoked. She has never used smokeless tobacco. She reports that she does not currently use alcohol. She reports that she does not use drugs.    Review of Systems  Review of Systems   Constitutional:  Negative for chills, fatigue and fever.   Respiratory: Negative.     Cardiovascular: Negative.  Negative for leg swelling.   Musculoskeletal:  Positive for arthralgias. Negative for gait problem, joint swelling, myalgias, neck pain and neck stiffness.   Skin:  Negative for rash and wound.   Allergic/Immunologic: Negative for immunocompromised state.   Neurological:  Negative for weakness and numbness.                                  Objective    Vitals:    02/15/25 0810   BP: 154/80   BP Location: Left arm   Patient Position: Sitting   Pulse: 70   Resp: 19   Temp: 36.3 °C (97.3 °F)   TempSrc: Oral   SpO2: 97%   Weight: 98 kg (216 lb)     No LMP recorded. Patient is postmenopausal.    Physical Exam  Vitals and nursing note reviewed.   Constitutional:       General: She is not in acute distress.     Appearance: Normal appearance. She is not ill-appearing, toxic-appearing or diaphoretic.   HENT:      Head: Normocephalic and atraumatic.      Mouth/Throat:      Mouth: Mucous membranes are moist.   Cardiovascular:      Rate and Rhythm: Normal rate and regular rhythm.      Pulses: Normal pulses.      Heart sounds: Normal heart sounds.   Pulmonary:      Effort: Pulmonary effort is normal.      Breath sounds: Normal breath sounds.   Musculoskeletal:         General: Tenderness (Right anterior shoulder, bicipital groove radiating along the arm, limited arc of rotation, neurovascular intact.)  present. No deformity.      Right lower leg: No edema.      Left lower leg: No edema.   Skin:     General: Skin is warm.   Neurological:      Mental Status: She is alert and oriented to person, place, and time.      Gait: Gait normal.   Psychiatric:         Mood and Affect: Mood normal.         Behavior: Behavior normal.         Procedures    Point of Care Test & Imaging Results from this visit  No results found for this visit on 02/15/25.   No results found.    Diagnostic study results (if any) were reviewed by Catalina Clay MD.    Assessment/Plan   Allergies, medications, history, and pertinent labs/EKGs/Imaging reviewed by Catalina Clay MD.     Medical Decision Making  Based on history, clinical exam, strain of right rotator cuff, remote injury x-ray results pending.  Treatment with gabapentin, muscle relaxer as needed please continue with warm Epsom salt compress alternating with ice, gentle stretching with therapeutic exercise band.  Referral to physical therapy.  Please continue with topical measures such as Bengay, IcyHot.  Continue with meloxicam daily, Tylenol as needed.  Referral to shoulder specialist for consideration of intra-articular injection.  Please continue to engage in daily physical activity, water-based exercise program at the [facility].    Orders and Diagnoses  Diagnoses and all orders for this visit:  Strain of tendon of right rotator cuff, initial encounter  -     gabapentin (Neurontin) 100 mg capsule; Take 1 capsule (100 mg) by mouth once daily at bedtime for 10 days.  -     tiZANidine (Zanaflex) 4 mg tablet; Take 1 tablet (4 mg) by mouth every 8 hours if needed for muscle spasms for up to 10 days.  -     XR shoulder right 2+ views; Future  -     Referral to Physical Therapy; Future  -     Referral to Orthopaedic Surgery; Future      Medical Admin Record      Patient disposition: Home    Electronically signed by Catalina Clay MD  9:17 AM

## 2025-02-15 NOTE — PATIENT INSTRUCTIONS
Based on history, clinical exam, strain of right rotator cuff, remote injury x-ray results pending.  Treatment with gabapentin, muscle relaxer as needed please continue with warm Epsom salt compress alternating with ice, gentle stretching with therapeutic exercise band.  Referral to physical therapy.  Please continue with topical measures such as Bengay, IcyHot.  Continue with meloxicam daily, Tylenol as needed.  Referral to shoulder specialist for consideration of intra-articular injection.  Please continue to engage in daily physical activity, water-based exercise program at the [facility].  Xray right shoulder  reviewed neg acute.

## 2025-03-05 DIAGNOSIS — Z96.652 S/P TOTAL KNEE ARTHROPLASTY, LEFT: ICD-10-CM

## 2025-03-05 RX ORDER — MELOXICAM 15 MG/1
15 TABLET ORAL DAILY
Qty: 30 TABLET | Refills: 0 | Status: SHIPPED | OUTPATIENT
Start: 2025-03-05

## 2025-04-19 DIAGNOSIS — Z96.652 S/P TOTAL KNEE ARTHROPLASTY, LEFT: ICD-10-CM

## 2025-04-21 RX ORDER — MELOXICAM 15 MG/1
15 TABLET ORAL DAILY
Qty: 30 TABLET | Refills: 0 | Status: SHIPPED | OUTPATIENT
Start: 2025-04-21

## 2025-05-04 DIAGNOSIS — E11.9 TYPE 2 DIABETES MELLITUS WITHOUT COMPLICATIONS: ICD-10-CM

## 2025-05-05 RX ORDER — GLIMEPIRIDE 4 MG/1
4 TABLET ORAL
Qty: 90 TABLET | Refills: 2 | Status: SHIPPED | OUTPATIENT
Start: 2025-05-05

## 2025-05-22 DIAGNOSIS — E11.9 TYPE 2 DIABETES MELLITUS WITHOUT COMPLICATIONS: ICD-10-CM

## 2025-05-23 RX ORDER — SEMAGLUTIDE 2.68 MG/ML
2 INJECTION, SOLUTION SUBCUTANEOUS
Qty: 3 ML | Refills: 3 | Status: SHIPPED | OUTPATIENT
Start: 2025-05-23

## 2025-06-11 ENCOUNTER — APPOINTMENT (OUTPATIENT)
Dept: OPHTHALMOLOGY | Facility: CLINIC | Age: 60
End: 2025-06-11
Payer: COMMERCIAL

## 2025-06-11 DIAGNOSIS — H25.813 COMBINED FORMS OF AGE-RELATED CATARACT OF BOTH EYES: ICD-10-CM

## 2025-06-11 DIAGNOSIS — H52.213 IRREGULAR ASTIGMATISM OF BOTH EYES: ICD-10-CM

## 2025-06-11 DIAGNOSIS — H52.203 ASTIGMATISM OF BOTH EYES WITH PRESBYOPIA: Primary | ICD-10-CM

## 2025-06-11 DIAGNOSIS — Z94.7 PENETRATING KERATOPLASTY GRAFT IN PLACE: ICD-10-CM

## 2025-06-11 DIAGNOSIS — E11.9 DIABETES MELLITUS TYPE 2 WITHOUT RETINOPATHY (MULTI): ICD-10-CM

## 2025-06-11 DIAGNOSIS — H52.4 ASTIGMATISM OF BOTH EYES WITH PRESBYOPIA: Primary | ICD-10-CM

## 2025-06-11 PROCEDURE — 92004 COMPRE OPH EXAM NEW PT 1/>: CPT | Performed by: OPTOMETRIST

## 2025-06-11 ASSESSMENT — EXTERNAL EXAM - LEFT EYE: OS_EXAM: NORMAL

## 2025-06-11 ASSESSMENT — CUP TO DISC RATIO
OD_RATIO: 0.2
OS_RATIO: 0.2

## 2025-06-11 ASSESSMENT — REFRACTION_MANIFEST
OD_CYLINDER: -0.75
OD_SPHERE: +0.00
OS_ADD: +2.25
OS_AXIS: 175
OD_ADD: +2.25
OS_SPHERE: +2.50
OD_AXIS: 070
OS_CYLINDER: -3.75

## 2025-06-11 ASSESSMENT — TONOMETRY
IOP_METHOD: GOLDMANN APPLANATION
OS_IOP_MMHG: 17
OD_IOP_MMHG: 18

## 2025-06-11 ASSESSMENT — VISUAL ACUITY
OS_SC: 20/80+2
METHOD: SNELLEN - LINEAR
OD_SC: 20/20
OS_PH_SC: 20/25-2
OD_BAT_MED: 20/30-1

## 2025-06-11 ASSESSMENT — SLIT LAMP EXAM - LIDS
COMMENTS: GOOD POSITION
COMMENTS: GOOD POSITION

## 2025-06-11 ASSESSMENT — EXTERNAL EXAM - RIGHT EYE: OD_EXAM: NORMAL

## 2025-06-11 NOTE — PROGRESS NOTES
DMII NO retinopathy.    Penetrating keratoplasty (PKP) OS. [facility] OU. Has tried a contact lens prior to penetrating keratoplasty (PKP) and after [facility] has been adequate OD in the past.  [facility] 20/20 20/30 OD/OS.     A spectacle prescription was dispensed to be used as needed.     RTC 1 year for manifest refraction (MR) BAT and ronel.

## 2025-06-24 ENCOUNTER — APPOINTMENT (OUTPATIENT)
Dept: RADIOLOGY | Facility: CLINIC | Age: 60
End: 2025-06-24
Payer: COMMERCIAL

## 2025-06-24 ENCOUNTER — APPOINTMENT (OUTPATIENT)
Dept: ORTHOPEDIC SURGERY | Facility: CLINIC | Age: 60
End: 2025-06-24
Payer: COMMERCIAL

## 2025-06-24 DIAGNOSIS — M17.10 PRIMARY OSTEOARTHRITIS OF KNEE, UNSPECIFIED LATERALITY: ICD-10-CM

## 2025-06-24 DIAGNOSIS — Z96.652 S/P TOTAL KNEE ARTHROPLASTY, LEFT: ICD-10-CM

## 2025-08-02 DIAGNOSIS — E11.69 TYPE 2 DIABETES MELLITUS WITH OTHER SPECIFIED COMPLICATION, WITHOUT LONG-TERM CURRENT USE OF INSULIN: ICD-10-CM

## 2025-08-04 RX ORDER — METFORMIN HYDROCHLORIDE 500 MG/1
TABLET, EXTENDED RELEASE ORAL
Qty: 180 TABLET | Refills: 1 | Status: SHIPPED | OUTPATIENT
Start: 2025-08-04

## 2026-06-24 ENCOUNTER — APPOINTMENT (OUTPATIENT)
Dept: OPHTHALMOLOGY | Facility: CLINIC | Age: 61
End: 2026-06-24
Payer: COMMERCIAL

## (undated) DEVICE — HOOD, SURGICAL, FLYTE HYBRID

## (undated) DEVICE — DRAPE, SHEET, U, W/ADHESIVE STRIP, IMPERVIOUS, 60 X 70 IN, DISPOSABLE, LF, STERILE

## (undated) DEVICE — SYRINGE, 60 CC, IRRIGATION, BULB, CONTRO-BULB, PAPER POUCH

## (undated) DEVICE — DRESSING, MEPILEX FOAM BORDER AG, SILVER, 4 X 4

## (undated) DEVICE — DRAPE, IRRIGATION, W/POUCH, ADHESIVE STRIP, STERI DRAPE, 19 X 23 IN, DISPOSABLE, STERILE

## (undated) DEVICE — SUTURE, VICRYL, 1, 27 IN, CT-1, VIOLET

## (undated) DEVICE — DRAPE, ISOLATION, INCISE, W/POUCH, STERI DRAPE, 125 X 83 IN, DISPOSABLE, STERILE

## (undated) DEVICE — TUBING, IRRIGATION, HIGH FLOW, HAND PIECE SET

## (undated) DEVICE — 10IN STRYKER SMOKE EVACUATION TUBING

## (undated) DEVICE — TIP, SUCTION, YANKAUER, FLEXIBLE

## (undated) DEVICE — SOLUTION, CHLORHEXIDINE, 4%, 4OZ

## (undated) DEVICE — Device

## (undated) DEVICE — SUTURE, CTD, VICRYL, 2-0, UND, BR, CT-2

## (undated) DEVICE — GLOVE, SURGICAL, PROTEXIS PI , 8.0, PF, LF

## (undated) DEVICE — SOLUTION, TOPICAL, ALCOHOL, 70% ISOPROPYL, 4OZ

## (undated) DEVICE — ADULT REM POLYHESIVE II PATIENT RETURN ELECTRODE W/9 FT (2.7 M) ATTACHED CORD

## (undated) DEVICE — CUFF, TOURNIQUET, 30 X 4, DUAL PORT/SNGL BLADDER, DISP, LF

## (undated) DEVICE — STRIP, SKIN CLOSURE, STERI STRIP, REINFORCED, 0.5 X 4 IN

## (undated) DEVICE — TRAY, DRY PREP, PREMIUM

## (undated) DEVICE — DRAPE, INCISE, ANTIMICROBIAL, IOBAN 2, STERI DRAPE, 23 X 33 IN, DISPOSABLE, STERILE

## (undated) DEVICE — SUTURE, MONOCRYL, 3-0, 27 IN, PS-2, UNDYED

## (undated) DEVICE — CEMENT, MIXEVAC III, 10S BOWL, KNEES

## (undated) DEVICE — BANDAGE, COFLEX, 6 X 5 YDS, TAN, STERILE, LF

## (undated) DEVICE — SUTURE, ETHIBOND, P2, V-37, 30 IN, GREEN

## (undated) DEVICE — BLANKET, LOWER BODY, VHA PLUS, ADULT